# Patient Record
Sex: MALE | Race: WHITE | NOT HISPANIC OR LATINO
[De-identification: names, ages, dates, MRNs, and addresses within clinical notes are randomized per-mention and may not be internally consistent; named-entity substitution may affect disease eponyms.]

---

## 2017-03-23 ENCOUNTER — APPOINTMENT (OUTPATIENT)
Dept: DERMATOLOGY | Facility: CLINIC | Age: 62
End: 2017-03-23

## 2017-03-23 DIAGNOSIS — Z78.9 OTHER SPECIFIED HEALTH STATUS: ICD-10-CM

## 2017-03-23 DIAGNOSIS — I83.10 VARICOSE VEINS OF UNSPECIFIED LOWER EXTREMITY WITH INFLAMMATION: ICD-10-CM

## 2017-03-23 DIAGNOSIS — Z12.83 ENCOUNTER FOR SCREENING FOR MALIGNANT NEOPLASM OF SKIN: ICD-10-CM

## 2017-09-28 ENCOUNTER — APPOINTMENT (OUTPATIENT)
Dept: DERMATOLOGY | Facility: CLINIC | Age: 62
End: 2017-09-28
Payer: COMMERCIAL

## 2017-09-28 DIAGNOSIS — L30.9 DERMATITIS, UNSPECIFIED: ICD-10-CM

## 2017-09-28 PROCEDURE — 11100 BX SKIN SUBCUTANEOUS&/MUCOUS MEMBRANE 1 LESION: CPT

## 2017-09-28 PROCEDURE — 99213 OFFICE O/P EST LOW 20 MIN: CPT | Mod: 25

## 2017-09-28 RX ORDER — METOPROLOL SUCCINATE 25 MG/1
25 TABLET, EXTENDED RELEASE ORAL
Qty: 30 | Refills: 0 | Status: ACTIVE | COMMUNITY
Start: 2017-08-19

## 2017-09-28 RX ORDER — LISINOPRIL 30 MG/1
TABLET ORAL
Refills: 0 | Status: DISCONTINUED | COMMUNITY
End: 2017-09-28

## 2017-10-02 LAB — CORE LAB BIOPSY: NORMAL

## 2017-10-12 ENCOUNTER — APPOINTMENT (OUTPATIENT)
Dept: DERMATOLOGY | Facility: CLINIC | Age: 62
End: 2017-10-12
Payer: COMMERCIAL

## 2017-10-12 PROCEDURE — 13121 CMPLX RPR S/A/L 2.6-7.5 CM: CPT

## 2017-10-12 PROCEDURE — 11604 EXC TR-EXT MAL+MARG 3.1-4 CM: CPT

## 2017-10-17 LAB — CORE LAB BIOPSY: NORMAL

## 2017-10-25 ENCOUNTER — APPOINTMENT (OUTPATIENT)
Dept: DERMATOLOGY | Facility: CLINIC | Age: 62
End: 2017-10-25
Payer: COMMERCIAL

## 2017-10-25 PROCEDURE — 99212 OFFICE O/P EST SF 10 MIN: CPT

## 2018-05-30 ENCOUNTER — APPOINTMENT (OUTPATIENT)
Dept: DERMATOLOGY | Facility: CLINIC | Age: 63
End: 2018-05-30
Payer: COMMERCIAL

## 2018-05-30 VITALS — HEIGHT: 76 IN | BODY MASS INDEX: 28.01 KG/M2 | WEIGHT: 230 LBS

## 2018-05-30 DIAGNOSIS — C44.612 BASAL CELL CARCINOMA OF SKIN OF RIGHT UPPER LIMB, INCLUDING SHOULDER: ICD-10-CM

## 2018-05-30 DIAGNOSIS — L25.5 UNSPECIFIED CONTACT DERMATITIS DUE TO PLANTS, EXCEPT FOOD: ICD-10-CM

## 2018-05-30 PROCEDURE — 17000 DESTRUCT PREMALG LESION: CPT

## 2018-05-30 PROCEDURE — 17003 DESTRUCT PREMALG LES 2-14: CPT

## 2018-05-30 PROCEDURE — 99213 OFFICE O/P EST LOW 20 MIN: CPT | Mod: 25

## 2018-07-16 ENCOUNTER — APPOINTMENT (OUTPATIENT)
Dept: DERMATOLOGY | Facility: CLINIC | Age: 63
End: 2018-07-16

## 2018-07-23 PROBLEM — Z78.9 ALCOHOL USE: Status: ACTIVE | Noted: 2017-03-23

## 2018-07-23 PROBLEM — C44.612 BASAL CELL CARCINOMA OF RIGHT UPPER EXTREMITY: Status: ACTIVE | Noted: 2017-10-12

## 2018-11-14 ENCOUNTER — APPOINTMENT (OUTPATIENT)
Dept: DERMATOLOGY | Facility: CLINIC | Age: 63
End: 2018-11-14
Payer: COMMERCIAL

## 2018-11-14 PROCEDURE — 11101 BIOPSY SKIN SUBQ&/MUCOUS MEMBRANE EA ADDL LESN: CPT

## 2018-11-14 PROCEDURE — 17000 DESTRUCT PREMALG LESION: CPT

## 2018-11-14 PROCEDURE — 11100 BX SKIN SUBCUTANEOUS&/MUCOUS MEMBRANE 1 LESION: CPT | Mod: 59

## 2018-11-14 PROCEDURE — 99213 OFFICE O/P EST LOW 20 MIN: CPT | Mod: 25

## 2018-11-14 PROCEDURE — 17003 DESTRUCT PREMALG LES 2-14: CPT

## 2018-11-14 RX ORDER — TRIAMCINOLONE ACETONIDE 1 MG/G
0.1 CREAM TOPICAL
Qty: 80 | Refills: 2 | Status: DISCONTINUED | COMMUNITY
Start: 2017-03-23 | End: 2018-11-14

## 2018-11-14 RX ORDER — CLOBETASOL PROPIONATE 0.5 MG/G
0.05 GEL TOPICAL
Qty: 1 | Refills: 1 | Status: DISCONTINUED | COMMUNITY
Start: 2018-05-30 | End: 2018-11-14

## 2018-11-19 LAB — CORE LAB BIOPSY: NORMAL

## 2019-01-03 ENCOUNTER — APPOINTMENT (OUTPATIENT)
Dept: DERMATOLOGY | Facility: CLINIC | Age: 64
End: 2019-01-03
Payer: COMMERCIAL

## 2019-01-03 PROCEDURE — 11604 EXC TR-EXT MAL+MARG 3.1-4 CM: CPT

## 2019-01-03 PROCEDURE — 13121 CMPLX RPR S/A/L 2.6-7.5 CM: CPT

## 2019-01-14 LAB — CORE LAB BIOPSY: NORMAL

## 2019-01-17 ENCOUNTER — APPOINTMENT (OUTPATIENT)
Dept: DERMATOLOGY | Facility: CLINIC | Age: 64
End: 2019-01-17
Payer: COMMERCIAL

## 2019-01-17 PROCEDURE — 11603 EXC TR-EXT MAL+MARG 2.1-3 CM: CPT

## 2019-01-17 PROCEDURE — 13101 CMPLX RPR TRUNK 2.6-7.5 CM: CPT

## 2019-01-17 RX ORDER — ASPIRIN 81 MG
81 TABLET, DELAYED RELEASE (ENTERIC COATED) ORAL
Refills: 0 | Status: ACTIVE | COMMUNITY

## 2019-01-25 LAB — CORE LAB BIOPSY: NORMAL

## 2019-01-31 ENCOUNTER — APPOINTMENT (OUTPATIENT)
Dept: DERMATOLOGY | Facility: CLINIC | Age: 64
End: 2019-01-31
Payer: COMMERCIAL

## 2019-01-31 DIAGNOSIS — D04.72 CARCINOMA IN SITU OF SKIN OF LEFT LOWER LIMB, INCLUDING HIP: ICD-10-CM

## 2019-01-31 PROCEDURE — 17264 DSTRJ MAL LES T/A/L 3.1-4.0: CPT

## 2019-02-03 PROBLEM — L25.5 RHUS DERMATITIS: Status: ACTIVE | Noted: 2018-05-30

## 2019-03-07 ENCOUNTER — APPOINTMENT (OUTPATIENT)
Dept: DERMATOLOGY | Facility: CLINIC | Age: 64
End: 2019-03-07
Payer: COMMERCIAL

## 2019-03-07 PROCEDURE — 13102 CMPLX RPR TRUNK ADDL 5CM/<: CPT

## 2019-03-07 PROCEDURE — 11603 EXC TR-EXT MAL+MARG 2.1-3 CM: CPT | Mod: 59

## 2019-03-07 PROCEDURE — 13101 CMPLX RPR TRUNK 2.6-7.5 CM: CPT

## 2019-03-07 PROCEDURE — 11604 EXC TR-EXT MAL+MARG 3.1-4 CM: CPT

## 2019-03-12 LAB — CORE LAB BIOPSY: NORMAL

## 2019-03-14 ENCOUNTER — APPOINTMENT (OUTPATIENT)
Dept: DERMATOLOGY | Facility: CLINIC | Age: 64
End: 2019-03-14
Payer: COMMERCIAL

## 2019-03-14 PROCEDURE — 99024 POSTOP FOLLOW-UP VISIT: CPT

## 2019-03-14 NOTE — HISTORY OF PRESENT ILLNESS
[FreeTextEntry1] : Partial suture removal. [de-identified] : Left lower leg - healing slowly.  No evidence of infection.  \par Every other suture removed, from both sites.\par Dressed.\par Continue wound care.\par f/u in 1 week for remaining sutures.

## 2019-03-21 ENCOUNTER — APPOINTMENT (OUTPATIENT)
Dept: DERMATOLOGY | Facility: CLINIC | Age: 64
End: 2019-03-21
Payer: COMMERCIAL

## 2019-03-21 PROCEDURE — 99212 OFFICE O/P EST SF 10 MIN: CPT

## 2019-03-21 NOTE — HISTORY OF PRESENT ILLNESS
[FreeTextEntry1] : Suture removal. [de-identified] : Left lateral lower leg - both sites well healed, without evidence of infection.\par Sutures all removed.\par Path with clear margins.\par \par f/u in 4 months for TBSE.

## 2019-10-02 ENCOUNTER — APPOINTMENT (OUTPATIENT)
Dept: DERMATOLOGY | Facility: CLINIC | Age: 64
End: 2019-10-02
Payer: COMMERCIAL

## 2019-10-02 DIAGNOSIS — Z00.00 ENCOUNTER FOR GENERAL ADULT MEDICAL EXAMINATION W/OUT ABNORMAL FINDINGS: ICD-10-CM

## 2019-10-02 PROCEDURE — 17003 DESTRUCT PREMALG LES 2-14: CPT

## 2019-10-02 PROCEDURE — 17000 DESTRUCT PREMALG LESION: CPT

## 2019-10-02 PROCEDURE — 99213 OFFICE O/P EST LOW 20 MIN: CPT | Mod: 25

## 2019-10-02 NOTE — PHYSICAL EXAM
[Alert] : alert [Oriented x 3] : ~L oriented x 3 [Well Nourished] : well nourished [FreeTextEntry3] : A full skin exam was performed including the scalp, face (including lips, ears, nose and eyes), neck, chest, abdomen, back, buttocks, upper extremities and lower extremities.  The patient declined examination of the genitalia, feet.\par The exam revealed the following benign growths:\par Gladwin pigmented nevi.\par Lentigines.\par \par keratotic papules - right eyebrow, right temple, left temple, and left dorsal hand.\par  [Full Body Skin Exam Performed] : performed

## 2019-10-02 NOTE — HISTORY OF PRESENT ILLNESS
[FreeTextEntry1] : Patient presents for skin examination. [de-identified] : Notes left dorsal hand lesion for 2 months.  No bleeding, but occasionally tender.  No self tx.  Slow growth.

## 2019-11-21 ENCOUNTER — APPOINTMENT (OUTPATIENT)
Dept: SURGERY | Facility: CLINIC | Age: 64
End: 2019-11-21
Payer: COMMERCIAL

## 2019-11-21 VITALS
HEART RATE: 95 BPM | RESPIRATION RATE: 15 BRPM | OXYGEN SATURATION: 96 % | TEMPERATURE: 98.1 F | DIASTOLIC BLOOD PRESSURE: 92 MMHG | HEIGHT: 75 IN | BODY MASS INDEX: 24.25 KG/M2 | SYSTOLIC BLOOD PRESSURE: 159 MMHG | WEIGHT: 195 LBS

## 2019-11-21 DIAGNOSIS — Z85.048 PERSONAL HISTORY OF OTHER MALIGNANT NEOPLASM OF RECTUM, RECTOSIGMOID JUNCTION, AND ANUS: ICD-10-CM

## 2019-11-21 DIAGNOSIS — Z80.0 FAMILY HISTORY OF MALIGNANT NEOPLASM OF DIGESTIVE ORGANS: ICD-10-CM

## 2019-11-21 DIAGNOSIS — K62.4 STENOSIS OF ANUS AND RECTUM: ICD-10-CM

## 2019-11-21 DIAGNOSIS — R15.9 FULL INCONTINENCE OF FECES: ICD-10-CM

## 2019-11-21 DIAGNOSIS — E78.00 PURE HYPERCHOLESTEROLEMIA, UNSPECIFIED: ICD-10-CM

## 2019-11-21 DIAGNOSIS — Z85.038 PERSONAL HISTORY OF OTHER MALIGNANT NEOPLASM OF LARGE INTESTINE: ICD-10-CM

## 2019-11-21 DIAGNOSIS — I10 ESSENTIAL (PRIMARY) HYPERTENSION: ICD-10-CM

## 2019-11-21 PROCEDURE — 99244 OFF/OP CNSLTJ NEW/EST MOD 40: CPT

## 2019-11-21 RX ORDER — PSYLLIUM SEED (WITH DEXTROSE)
POWDER (GRAM) ORAL
Refills: 0 | Status: ACTIVE | COMMUNITY

## 2019-11-21 RX ORDER — AMOXICILLIN AND CLAVULANATE POTASSIUM 875; 125 MG/1; MG/1
875-125 TABLET, COATED ORAL
Qty: 20 | Refills: 0 | Status: DISCONTINUED | COMMUNITY
Start: 2018-11-23 | End: 2019-11-21

## 2019-11-21 RX ORDER — ATORVASTATIN CALCIUM 20 MG/1
20 TABLET, FILM COATED ORAL
Refills: 0 | Status: ACTIVE | COMMUNITY

## 2019-11-21 RX ORDER — ATORVASTATIN CALCIUM 80 MG/1
TABLET, FILM COATED ORAL
Refills: 0 | Status: DISCONTINUED | COMMUNITY
End: 2019-11-21

## 2019-11-21 NOTE — ASSESSMENT
[FreeTextEntry1] : I've seen and evaluated patient and I have corroborated all nursing input into this note. Patient status post neoadjuvant radiation followed by low anterior resection. He has significant distal scarring from the prior surgery and radiation. This has resulted in angulation and loss of compliance. He also has poor resting tone consistent with previous radiation. 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 Unfortunately this is a difficult problem to manage. I prescribed physical therapy and biofeedback. I also suggested a trial of tap water enemas using a Fleet enema bottle to wash out the neorectum prior to bedtime and after bowel movements to hopefully reduce the residual stool and in turn reduce the spontaneous evacuations. The patient has not seen his surgeon who performed the surgery that has resulted in this problem and I suggested that followup with the operating surgeon may be of some benefit because the operating surgeon will have the knowledge of the technical details involved with the surgery\par \par Finally, the patient has an irreducible incisional hernia at his ileostomy site. I ordered a CT scan to better define the hernia. After that, I will refer the patient to one of the hernia specialists in my group.

## 2019-11-21 NOTE — HISTORY OF PRESENT ILLNESS
[FreeTextEntry1] : Anthony is a 63 y/o male here for evaluation of fecal incontinence. PMH of rectal cancer, s/p neoadjuvant RT then LAR with diverting ileostomy with later ileostomy closure. Reports intermittent episodes of fecal incontinence for at least 10 years. Reports he is now experiencing constant rectal pressure and daily episodes of fecal incontinence of loose stool for the past 5 years. Patient takes Metamucil daily. Otherwise, has either loose stool or semi-solid BM every 2 to 3 days. Has abdominal discomfort. \par \par Colonoscopy 11/05/19 no recurrence

## 2019-11-21 NOTE — CONSULT LETTER
[Dear  ___] : Dear ~STEVEN, [Consult Letter:] : I had the pleasure of evaluating your patient, [unfilled]. [Please see my note below.] : Please see my note below. [Consult Closing:] : Thank you very much for allowing me to participate in the care of this patient.  If you have any questions, please do not hesitate to contact me. [Sincerely,] : Sincerely, [FreeTextEntry2] : Dr. Giacomo Rausch [FreeTextEntry3] : Miguel Ángel Rosa M.D., RHODA.CHOCO., F.ANGEL.S.KORYRNikkiS.\[facility] Chief Colorectal Clinical Services, [facility] [DrNikki  ___] : Dr. WARE

## 2019-11-21 NOTE — PHYSICAL EXAM
[Normal Breath Sounds] : Normal breath sounds [Normal Heart Sounds] : normal heart sounds [Normal Rate and Rhythm] : normal rate and rhythm [No Rash or Lesion] : No rash or lesion [Alert] : alert [Oriented to Person] : oriented to person [Oriented to Place] : oriented to place [Oriented to Time] : oriented to time [Calm] : calm [JVD] : no jugular venous distention  [de-identified] : non-tender irreducible incisional hernia at ileostomy site [de-identified] : Well nourished male, in no apparent distress [de-identified] : WNL [de-identified] : Full ROM [FreeTextEntry1] : Perianal inspection demonstrated a gaping anus with excoriations consistent with prior radiation. There was poor resting tone and fair squeeze tone on digital exam. Severe scarring with loss of compliance of the distal rectum was noted. Sharp posterior angulation was present. Therefore rigid sigmoidoscopy could not be performed.

## 2020-01-16 ENCOUNTER — FORM ENCOUNTER (OUTPATIENT)
Age: 65
End: 2020-01-16

## 2020-01-17 ENCOUNTER — OUTPATIENT (OUTPATIENT)
Dept: OUTPATIENT SERVICES | Facility: HOSPITAL | Age: 65
LOS: 1 days | End: 2020-01-17
Payer: COMMERCIAL

## 2020-01-17 ENCOUNTER — APPOINTMENT (OUTPATIENT)
Dept: CT IMAGING | Facility: CLINIC | Age: 65
End: 2020-01-17
Payer: COMMERCIAL

## 2020-01-17 DIAGNOSIS — Z85.048 PERSONAL HISTORY OF OTHER MALIGNANT NEOPLASM OF RECTUM, RECTOSIGMOID JUNCTION, AND ANUS: ICD-10-CM

## 2020-01-17 DIAGNOSIS — K43.2 INCISIONAL HERNIA WITHOUT OBSTRUCTION OR GANGRENE: ICD-10-CM

## 2020-01-17 PROCEDURE — 74177 CT ABD & PELVIS W/CONTRAST: CPT | Mod: 26

## 2020-01-17 PROCEDURE — 82565 ASSAY OF CREATININE: CPT

## 2020-01-17 PROCEDURE — 74177 CT ABD & PELVIS W/CONTRAST: CPT

## 2020-01-28 ENCOUNTER — APPOINTMENT (OUTPATIENT)
Dept: SURGERY | Facility: CLINIC | Age: 65
End: 2020-01-28
Payer: COMMERCIAL

## 2020-01-28 VITALS
BODY MASS INDEX: 24.25 KG/M2 | WEIGHT: 195 LBS | OXYGEN SATURATION: 97 % | RESPIRATION RATE: 16 BRPM | HEIGHT: 75 IN | SYSTOLIC BLOOD PRESSURE: 153 MMHG | TEMPERATURE: 98.2 F | HEART RATE: 74 BPM | DIASTOLIC BLOOD PRESSURE: 92 MMHG

## 2020-01-28 DIAGNOSIS — Z22.322 CARRIER OR SUSPECTED CARRIER OF METHICILLIN RESISTANT STAPHYLOCOCCUS AUREUS: ICD-10-CM

## 2020-01-28 DIAGNOSIS — A49.01 METHICILLIN SUSCEPTIBLE STAPHYLOCOCCUS AUREUS INFECTION, UNSPECIFIED SITE: ICD-10-CM

## 2020-01-28 DIAGNOSIS — Z22.321 CARRIER OR SUSPECTED CARRIER OF METHICILLIN SUSCEPTIBLE STAPHYLOCOCCUS AUREUS: ICD-10-CM

## 2020-01-28 PROCEDURE — 99244 OFF/OP CNSLTJ NEW/EST MOD 40: CPT

## 2020-01-28 NOTE — HISTORY OF PRESENT ILLNESS
[de-identified] : Anthony is a 65 y/o male here for evaluation of hernia. PMH of rectal cancer, s/p neoadjuvant RT then LAR with diverting ileostomy with later ileostomy closure. Last seen on 11/21/19 by Dr. Rosa. Patient with an irreducible incisional hernia at ileostomy site. CT scan recommended. 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 I reviewed the CT images with the patient and his wife. The patient states that he has never had any symptoms suggestive of small bowel obstruction. There was never been an episode of incarceration of this incisional hernia causing pain

## 2020-01-28 NOTE — PHYSICAL EXAM
[Normal Heart Sounds] : normal heart sounds [No HSM] : no hepatosplenomegaly [Alert] : alert [Oriented to Person] : oriented to person [Oriented to Place] : oriented to place [Calm] : calm [Oriented to Time] : oriented to time [JVD] : no jugular venous distention  [Abdominal Masses] : No abdominal masses [Abdomen Tenderness] : ~T ~M No abdominal tenderness [de-identified] : Developed well-nourished white male in no acute distress [de-identified] : Normal strength and gait [de-identified] : Within normal limits cranial nerves intact [de-identified] : There is a partially reducible right lower quadrant incisional hernia through the previous ileostomy site.

## 2020-01-28 NOTE — CONSULT LETTER
[Dear  ___] : Dear  [unfilled], [Please see my note below.] : Please see my note below. [Consult Closing:] : Thank you very much for allowing me to participate in the care of this patient.  If you have any questions, please do not hesitate to contact me. [Consult Letter:] : I had the pleasure of evaluating your patient, [unfilled]. [Sincerely,] : Sincerely, [FreeTextEntry3] : I have reviewed all the documentation for this encounter with the patient and have edited where appropriate\par \par Dr. Rohit Cortés

## 2020-01-28 NOTE — ASSESSMENT
[FreeTextEntry1] : A detailed discussion with the patient and his wife about repair of incisional hernias. I told the patient that this should be fixed specifically because there is bowel in this hernia. I discussed with the patient and his wife the different options for repair but no matter what the procedure would be used mesh will be used either in the abdomen or in the abdominal wall itself.\par \par I also showed him the CT scan that the patient has a left inguinal hernia containing fat that would not be addressed at the time of the repair of his incisional hernia.\par \par He patient and his wife S. older appropriate questions and all of which were answered to their satisfaction. They are going to go home and discuss my consultation among some cells and florentino back when they're ready to book surgery.\par \par I have discussed the risks and benefits  of surgery with the patient.  The risks which include  but are not exclusive of other issues included bleeding, bowel injury and the possibility of using mesh with the inherent risk of infection requiring the removal of the mesh.

## 2020-01-29 PROBLEM — A49.01 MSSA (METHICILLIN SUSCEPTIBLE STAPHYLOCOCCUS AUREUS): Status: ACTIVE | Noted: 2020-01-29

## 2020-01-29 PROBLEM — Z22.321 MSSA (METHICILLIN-SUSCEPTIBLE STAPH AUREUS) CARRIER: Status: ACTIVE | Noted: 2020-01-29

## 2020-01-29 LAB
MRSA SPEC QL CULT: NOT DETECTED
STAPH AUREUS (SA): DETECTED

## 2020-02-06 ENCOUNTER — OUTPATIENT (OUTPATIENT)
Dept: OUTPATIENT SERVICES | Facility: HOSPITAL | Age: 65
LOS: 1 days | End: 2020-02-06
Payer: COMMERCIAL

## 2020-02-06 VITALS
TEMPERATURE: 98 F | RESPIRATION RATE: 18 BRPM | HEART RATE: 82 BPM | SYSTOLIC BLOOD PRESSURE: 143 MMHG | OXYGEN SATURATION: 96 % | WEIGHT: 195.99 LBS | DIASTOLIC BLOOD PRESSURE: 85 MMHG | HEIGHT: 75 IN

## 2020-02-06 DIAGNOSIS — K43.2 INCISIONAL HERNIA WITHOUT OBSTRUCTION OR GANGRENE: ICD-10-CM

## 2020-02-06 DIAGNOSIS — I10 ESSENTIAL (PRIMARY) HYPERTENSION: ICD-10-CM

## 2020-02-06 DIAGNOSIS — Z01.818 ENCOUNTER FOR OTHER PREPROCEDURAL EXAMINATION: ICD-10-CM

## 2020-02-06 DIAGNOSIS — Z98.890 OTHER SPECIFIED POSTPROCEDURAL STATES: Chronic | ICD-10-CM

## 2020-02-06 DIAGNOSIS — Z90.89 ACQUIRED ABSENCE OF OTHER ORGANS: Chronic | ICD-10-CM

## 2020-02-06 DIAGNOSIS — Z90.49 ACQUIRED ABSENCE OF OTHER SPECIFIED PARTS OF DIGESTIVE TRACT: Chronic | ICD-10-CM

## 2020-02-06 LAB
ANION GAP SERPL CALC-SCNC: 17 MMOL/L — SIGNIFICANT CHANGE UP (ref 5–17)
BUN SERPL-MCNC: 11 MG/DL — SIGNIFICANT CHANGE UP (ref 7–23)
CALCIUM SERPL-MCNC: 9.7 MG/DL — SIGNIFICANT CHANGE UP (ref 8.4–10.5)
CHLORIDE SERPL-SCNC: 100 MMOL/L — SIGNIFICANT CHANGE UP (ref 96–108)
CO2 SERPL-SCNC: 25 MMOL/L — SIGNIFICANT CHANGE UP (ref 22–31)
CREAT SERPL-MCNC: 0.65 MG/DL — SIGNIFICANT CHANGE UP (ref 0.5–1.3)
GLUCOSE SERPL-MCNC: 86 MG/DL — SIGNIFICANT CHANGE UP (ref 70–99)
HCT VFR BLD CALC: 48.3 % — SIGNIFICANT CHANGE UP (ref 39–50)
HGB BLD-MCNC: 16 G/DL — SIGNIFICANT CHANGE UP (ref 13–17)
MCHC RBC-ENTMCNC: 33.1 GM/DL — SIGNIFICANT CHANGE UP (ref 32–36)
MCHC RBC-ENTMCNC: 33.2 PG — SIGNIFICANT CHANGE UP (ref 27–34)
MCV RBC AUTO: 100.2 FL — HIGH (ref 80–100)
NRBC # BLD: 0 /100 WBCS — SIGNIFICANT CHANGE UP (ref 0–0)
PLATELET # BLD AUTO: 223 K/UL — SIGNIFICANT CHANGE UP (ref 150–400)
POTASSIUM SERPL-MCNC: 4.3 MMOL/L — SIGNIFICANT CHANGE UP (ref 3.5–5.3)
POTASSIUM SERPL-SCNC: 4.3 MMOL/L — SIGNIFICANT CHANGE UP (ref 3.5–5.3)
RBC # BLD: 4.82 M/UL — SIGNIFICANT CHANGE UP (ref 4.2–5.8)
RBC # FLD: 12.2 % — SIGNIFICANT CHANGE UP (ref 10.3–14.5)
SODIUM SERPL-SCNC: 142 MMOL/L — SIGNIFICANT CHANGE UP (ref 135–145)
WBC # BLD: 8.32 K/UL — SIGNIFICANT CHANGE UP (ref 3.8–10.5)
WBC # FLD AUTO: 8.32 K/UL — SIGNIFICANT CHANGE UP (ref 3.8–10.5)

## 2020-02-06 PROCEDURE — 80048 BASIC METABOLIC PNL TOTAL CA: CPT

## 2020-02-06 PROCEDURE — G0463: CPT

## 2020-02-06 PROCEDURE — 85027 COMPLETE CBC AUTOMATED: CPT

## 2020-02-06 RX ORDER — CHLORHEXIDINE GLUCONATE 213 G/1000ML
1 SOLUTION TOPICAL ONCE
Refills: 0 | Status: DISCONTINUED | OUTPATIENT
Start: 2020-02-18 | End: 2020-02-20

## 2020-02-06 NOTE — H&P PST ADULT - NSICDXPASTSURGICALHX_GEN_ALL_CORE_FT
PAST SURGICAL HISTORY:  History of ankle surgery right ankle ORIF 2016    History of colon resection with illeostomy 2000    S/P tonsillectomy PAST SURGICAL HISTORY:  History of ankle surgery right ankle ORIF 2016    History of colon resection with ileostomy 2000 and  later with ileostomy closure    S/P tonsillectomy

## 2020-02-06 NOTE — H&P PST ADULT - NSICDXPASTMEDICALHX_GEN_ALL_CORE_FT
PAST MEDICAL HISTORY:  History of colorectal cancer 2004 with chemo and rad    Hyperlipidemia     Hypertension

## 2020-02-06 NOTE — H&P PST ADULT - NSICDXPROBLEM_GEN_ALL_CORE_FT
PROBLEM DIAGNOSES  Problem: Incisional hernia without obstruction or gangrene  Assessment and Plan: incisional hernia repair with mesh with epidural anesthesia    Problem: Hypertension  Assessment and Plan: continue med

## 2020-02-06 NOTE — H&P PST ADULT - HISTORY OF PRESENT ILLNESS
This is a 65 y/o male c/o abdominal hernia from old ileostomy site, he presents today for incisional hernia  repair with mesh with epidural anesthesia.  pt  with + MRSA swab done 1/20/20, and tomorrow he will start bactroban ointment to nose x 5 days prescribed by surgeon

## 2020-02-06 NOTE — H&P PST ADULT - NSANTHOSAYNRD_GEN_A_CORE
No. ABDIAZIZ screening performed.  STOP BANG Legend: 0-2 = LOW Risk; 3-4 = INTERMEDIATE Risk; 5-8 = HIGH Risk

## 2020-02-17 ENCOUNTER — TRANSCRIPTION ENCOUNTER (OUTPATIENT)
Age: 65
End: 2020-02-17

## 2020-02-18 ENCOUNTER — INPATIENT (INPATIENT)
Facility: HOSPITAL | Age: 65
LOS: 1 days | Discharge: HOME | DRG: 355 | End: 2020-02-20
Attending: SURGERY | Admitting: SURGERY
Payer: COMMERCIAL

## 2020-02-18 ENCOUNTER — APPOINTMENT (OUTPATIENT)
Dept: SURGERY | Facility: HOSPITAL | Age: 65
End: 2020-02-18
Payer: COMMERCIAL

## 2020-02-18 VITALS
OXYGEN SATURATION: 97 % | TEMPERATURE: 99 F | HEIGHT: 75 IN | WEIGHT: 195.99 LBS | DIASTOLIC BLOOD PRESSURE: 90 MMHG | RESPIRATION RATE: 16 BRPM | HEART RATE: 81 BPM | SYSTOLIC BLOOD PRESSURE: 151 MMHG

## 2020-02-18 DIAGNOSIS — Z90.49 ACQUIRED ABSENCE OF OTHER SPECIFIED PARTS OF DIGESTIVE TRACT: Chronic | ICD-10-CM

## 2020-02-18 DIAGNOSIS — Z90.89 ACQUIRED ABSENCE OF OTHER ORGANS: Chronic | ICD-10-CM

## 2020-02-18 DIAGNOSIS — Z98.890 OTHER SPECIFIED POSTPROCEDURAL STATES: Chronic | ICD-10-CM

## 2020-02-18 DIAGNOSIS — K43.2 INCISIONAL HERNIA WITHOUT OBSTRUCTION OR GANGRENE: ICD-10-CM

## 2020-02-18 PROCEDURE — 49568: CPT | Mod: 82

## 2020-02-18 PROCEDURE — 49560: CPT

## 2020-02-18 PROCEDURE — 49560: CPT | Mod: 82

## 2020-02-18 PROCEDURE — 49568: CPT

## 2020-02-18 RX ORDER — NALOXONE HYDROCHLORIDE 4 MG/.1ML
0.1 SPRAY NASAL
Refills: 0 | Status: DISCONTINUED | OUTPATIENT
Start: 2020-02-18 | End: 2020-02-19

## 2020-02-18 RX ORDER — ATORVASTATIN CALCIUM 80 MG/1
20 TABLET, FILM COATED ORAL AT BEDTIME
Refills: 0 | Status: DISCONTINUED | OUTPATIENT
Start: 2020-02-18 | End: 2020-02-20

## 2020-02-18 RX ORDER — ACETAMINOPHEN 500 MG
1000 TABLET ORAL ONCE
Refills: 0 | Status: DISCONTINUED | OUTPATIENT
Start: 2020-02-18 | End: 2020-02-18

## 2020-02-18 RX ORDER — ONDANSETRON 8 MG/1
4 TABLET, FILM COATED ORAL EVERY 6 HOURS
Refills: 0 | Status: DISCONTINUED | OUTPATIENT
Start: 2020-02-18 | End: 2020-02-19

## 2020-02-18 RX ORDER — LIDOCAINE HCL 20 MG/ML
0.2 VIAL (ML) INJECTION ONCE
Refills: 0 | Status: DISCONTINUED | OUTPATIENT
Start: 2020-02-18 | End: 2020-02-18

## 2020-02-18 RX ORDER — DIPHENHYDRAMINE HCL 50 MG
25 CAPSULE ORAL EVERY 4 HOURS
Refills: 0 | Status: DISCONTINUED | OUTPATIENT
Start: 2020-02-18 | End: 2020-02-19

## 2020-02-18 RX ORDER — HEPARIN SODIUM 5000 [USP'U]/ML
5000 INJECTION INTRAVENOUS; SUBCUTANEOUS EVERY 8 HOURS
Refills: 0 | Status: DISCONTINUED | OUTPATIENT
Start: 2020-02-18 | End: 2020-02-20

## 2020-02-18 RX ORDER — ONDANSETRON 8 MG/1
4 TABLET, FILM COATED ORAL ONCE
Refills: 0 | Status: DISCONTINUED | OUTPATIENT
Start: 2020-02-18 | End: 2020-02-18

## 2020-02-18 RX ORDER — DEXAMETHASONE 0.5 MG/5ML
4 ELIXIR ORAL EVERY 6 HOURS
Refills: 0 | Status: DISCONTINUED | OUTPATIENT
Start: 2020-02-18 | End: 2020-02-19

## 2020-02-18 RX ORDER — SODIUM CHLORIDE 9 MG/ML
3 INJECTION INTRAMUSCULAR; INTRAVENOUS; SUBCUTANEOUS EVERY 8 HOURS
Refills: 0 | Status: DISCONTINUED | OUTPATIENT
Start: 2020-02-18 | End: 2020-02-18

## 2020-02-18 RX ORDER — HYDROMORPHONE HYDROCHLORIDE 2 MG/ML
0.5 INJECTION INTRAMUSCULAR; INTRAVENOUS; SUBCUTANEOUS
Refills: 0 | Status: DISCONTINUED | OUTPATIENT
Start: 2020-02-18 | End: 2020-02-18

## 2020-02-18 RX ORDER — METOPROLOL TARTRATE 50 MG
25 TABLET ORAL DAILY
Refills: 0 | Status: DISCONTINUED | OUTPATIENT
Start: 2020-02-19 | End: 2020-02-20

## 2020-02-18 RX ORDER — CEFAZOLIN SODIUM 1 G
2000 VIAL (EA) INJECTION ONCE
Refills: 0 | Status: DISCONTINUED | OUTPATIENT
Start: 2020-02-18 | End: 2020-02-18

## 2020-02-18 RX ORDER — SODIUM CHLORIDE 9 MG/ML
1000 INJECTION, SOLUTION INTRAVENOUS
Refills: 0 | Status: DISCONTINUED | OUTPATIENT
Start: 2020-02-18 | End: 2020-02-19

## 2020-02-18 RX ADMIN — ATORVASTATIN CALCIUM 20 MILLIGRAM(S): 80 TABLET, FILM COATED ORAL at 21:09

## 2020-02-18 RX ADMIN — HEPARIN SODIUM 5000 UNIT(S): 5000 INJECTION INTRAVENOUS; SUBCUTANEOUS at 19:42

## 2020-02-18 RX ADMIN — SODIUM CHLORIDE 100 MILLILITER(S): 9 INJECTION, SOLUTION INTRAVENOUS at 15:04

## 2020-02-18 NOTE — ASU PREOP CHECKLIST - HEIGHT IN INCHES
Visit Vitals  /60 (BP 1 Location: Left arm, BP Patient Position: Sitting)   Pulse 65   Resp 16   Ht 5' 9\" (1.753 m)   Wt 200 lb (90.7 kg)   SpO2 98%   BMI 29.53 kg/m² 3

## 2020-02-18 NOTE — CHART NOTE - NSCHARTNOTEFT_GEN_A_CORE
STATUS POST:  Incisional hernia repair with mesh    POST OPERATIVE DAY #: 0    SUBJECTIVE: Pt seen patient seen at bedside, patient claims pain is well controlled on PCA, tolerating CLD, denies abdominal pain, nausea, vomiting, SOB, and chest pain      Vital Signs Last 24 Hrs  T(C): 36.7 (18 Feb 2020 18:38), Max: 37 (18 Feb 2020 10:08)  T(F): 98.1 (18 Feb 2020 18:38), Max: 98.6 (18 Feb 2020 10:08)  HR: 94 (18 Feb 2020 18:38) (70 - 94)  BP: 147/82 (18 Feb 2020 18:38) (117/62 - 151/90)  BP(mean): 95 (18 Feb 2020 15:00) (83 - 95)  RR: 18 (18 Feb 2020 18:38) (14 - 18)  SpO2: 93% (18 Feb 2020 18:38) (93% - 99%)  I&O's Summary    18 Feb 2020 07:01  -  18 Feb 2020 18:53  --------------------------------------------------------  IN: 340 mL / OUT: 500 mL / NET: -160 mL      I&O's Detail    18 Feb 2020 07:01  -  18 Feb 2020 18:53  --------------------------------------------------------  IN:    lactated ringers.: 340 mL  Total IN: 340 mL    OUT:    Indwelling Catheter - Urethral: 500 mL  Total OUT: 500 mL    Total NET: -160 mL          MEDICATIONS  (STANDING):  atorvastatin 20 milliGRAM(s) Oral at bedtime  chlorhexidine 2% Cloths 1 Application(s) Topical once  heparin  Injectable 5000 Unit(s) SubCutaneous every 8 hours  hydromorphone (10 MICROgram(s)/mL) + bupivacaine 0.0625% in 0.9% Sodium Chloride PCEA 250 milliLiter(s) Epidural PCA Continuous  lactated ringers. 1000 milliLiter(s) (100 mL/Hr) IV Continuous <Continuous>    MEDICATIONS  (PRN):  dexAMETHasone  Injectable 4 milliGRAM(s) IV Push every 6 hours PRN Nausea, IF ondansetron is ineffective after 30 - 60 minutes  diphenhydrAMINE   Injectable 25 milliGRAM(s) IV Push every 4 hours PRN Pruritus  hydromorphone (10 MICROgram(s)/mL) + bupivacaine 0.0625% in 0.9% Sodium Chloride PCEA Rescue Clinician  Bolus 5 milliLiter(s) Epidural every 15 minutes PRN for Pain Score greater than 6  naloxone Injectable 0.1 milliGRAM(s) IV Push every 3 minutes PRN For ANY of the following changes in patient status:  A. RR LESS THAN 10 breaths per minute, B. Oxygen saturation LESS THAN 90%, C. Sedation score of 6  ondansetron Injectable 4 milliGRAM(s) IV Push every 6 hours PRN Nausea      LABS:                RADIOLOGY & ADDITIONAL STUDIES:    Physical Exam:  General: NAD, resting comfortably  HEENT: NC/AT, EOMI, normal hearing, no oral lesions, no LAD, neck supple  Pulmonary: normal resp effort  Abdominal: soft, non distended, non tender to palpation, dressing c/d/i  Neuro: A/O x 3, no focal deficits  Pulses: palpable distal pulses    A/P: 64y Male s/p as above  - CLD  - OOB  - F/U AM labs   - Pain medication  - DVT ppx

## 2020-02-19 ENCOUNTER — TRANSCRIPTION ENCOUNTER (OUTPATIENT)
Age: 65
End: 2020-02-19

## 2020-02-19 PROBLEM — Z85.038 PERSONAL HISTORY OF OTHER MALIGNANT NEOPLASM OF LARGE INTESTINE: Chronic | Status: ACTIVE | Noted: 2020-02-06

## 2020-02-19 PROBLEM — E78.5 HYPERLIPIDEMIA, UNSPECIFIED: Chronic | Status: ACTIVE | Noted: 2020-02-06

## 2020-02-19 PROBLEM — I10 ESSENTIAL (PRIMARY) HYPERTENSION: Chronic | Status: ACTIVE | Noted: 2020-02-06

## 2020-02-19 LAB
ANION GAP SERPL CALC-SCNC: 15 MMOL/L — SIGNIFICANT CHANGE UP (ref 5–17)
BASOPHILS # BLD AUTO: 0.01 K/UL — SIGNIFICANT CHANGE UP (ref 0–0.2)
BASOPHILS NFR BLD AUTO: 0.1 % — SIGNIFICANT CHANGE UP (ref 0–2)
BUN SERPL-MCNC: 7 MG/DL — SIGNIFICANT CHANGE UP (ref 7–23)
CALCIUM SERPL-MCNC: 8.6 MG/DL — SIGNIFICANT CHANGE UP (ref 8.4–10.5)
CHLORIDE SERPL-SCNC: 99 MMOL/L — SIGNIFICANT CHANGE UP (ref 96–108)
CO2 SERPL-SCNC: 24 MMOL/L — SIGNIFICANT CHANGE UP (ref 22–31)
CREAT SERPL-MCNC: 0.54 MG/DL — SIGNIFICANT CHANGE UP (ref 0.5–1.3)
EOSINOPHIL # BLD AUTO: 0 K/UL — SIGNIFICANT CHANGE UP (ref 0–0.5)
EOSINOPHIL NFR BLD AUTO: 0 % — SIGNIFICANT CHANGE UP (ref 0–6)
GLUCOSE SERPL-MCNC: 118 MG/DL — HIGH (ref 70–99)
HCT VFR BLD CALC: 40.7 % — SIGNIFICANT CHANGE UP (ref 39–50)
HGB BLD-MCNC: 13.1 G/DL — SIGNIFICANT CHANGE UP (ref 13–17)
IMM GRANULOCYTES NFR BLD AUTO: 0.5 % — SIGNIFICANT CHANGE UP (ref 0–1.5)
INR BLD: 0.99 RATIO — SIGNIFICANT CHANGE UP (ref 0.88–1.16)
LYMPHOCYTES # BLD AUTO: 0.93 K/UL — LOW (ref 1–3.3)
LYMPHOCYTES # BLD AUTO: 7.9 % — LOW (ref 13–44)
MAGNESIUM SERPL-MCNC: 2 MG/DL — SIGNIFICANT CHANGE UP (ref 1.6–2.6)
MCHC RBC-ENTMCNC: 32.2 GM/DL — SIGNIFICANT CHANGE UP (ref 32–36)
MCHC RBC-ENTMCNC: 33.2 PG — SIGNIFICANT CHANGE UP (ref 27–34)
MCV RBC AUTO: 103 FL — HIGH (ref 80–100)
MONOCYTES # BLD AUTO: 1.07 K/UL — HIGH (ref 0–0.9)
MONOCYTES NFR BLD AUTO: 9.1 % — SIGNIFICANT CHANGE UP (ref 2–14)
NEUTROPHILS # BLD AUTO: 9.67 K/UL — HIGH (ref 1.8–7.4)
NEUTROPHILS NFR BLD AUTO: 82.4 % — HIGH (ref 43–77)
NRBC # BLD: 0 /100 WBCS — SIGNIFICANT CHANGE UP (ref 0–0)
PHOSPHATE SERPL-MCNC: 2.9 MG/DL — SIGNIFICANT CHANGE UP (ref 2.5–4.5)
PLATELET # BLD AUTO: 193 K/UL — SIGNIFICANT CHANGE UP (ref 150–400)
POTASSIUM SERPL-MCNC: 4.4 MMOL/L — SIGNIFICANT CHANGE UP (ref 3.5–5.3)
POTASSIUM SERPL-SCNC: 4.4 MMOL/L — SIGNIFICANT CHANGE UP (ref 3.5–5.3)
PROTHROM AB SERPL-ACNC: 11.4 SEC — SIGNIFICANT CHANGE UP (ref 10–12.9)
RBC # BLD: 3.95 M/UL — LOW (ref 4.2–5.8)
RBC # FLD: 12.1 % — SIGNIFICANT CHANGE UP (ref 10.3–14.5)
SODIUM SERPL-SCNC: 138 MMOL/L — SIGNIFICANT CHANGE UP (ref 135–145)
WBC # BLD: 11.74 K/UL — HIGH (ref 3.8–10.5)
WBC # FLD AUTO: 11.74 K/UL — HIGH (ref 3.8–10.5)

## 2020-02-19 RX ORDER — ACETAMINOPHEN 500 MG
3 TABLET ORAL
Qty: 0 | Refills: 0 | DISCHARGE
Start: 2020-02-19

## 2020-02-19 RX ORDER — ACETAMINOPHEN 500 MG
975 TABLET ORAL EVERY 6 HOURS
Refills: 0 | Status: DISCONTINUED | OUTPATIENT
Start: 2020-02-19 | End: 2020-02-20

## 2020-02-19 RX ORDER — OXYCODONE HYDROCHLORIDE 5 MG/1
5 TABLET ORAL EVERY 4 HOURS
Refills: 0 | Status: DISCONTINUED | OUTPATIENT
Start: 2020-02-19 | End: 2020-02-20

## 2020-02-19 RX ORDER — OXYCODONE HYDROCHLORIDE 5 MG/1
1 TABLET ORAL
Qty: 25 | Refills: 0
Start: 2020-02-19

## 2020-02-19 RX ORDER — OXYCODONE HYDROCHLORIDE 5 MG/1
10 TABLET ORAL EVERY 6 HOURS
Refills: 0 | Status: DISCONTINUED | OUTPATIENT
Start: 2020-02-19 | End: 2020-02-20

## 2020-02-19 RX ADMIN — OXYCODONE HYDROCHLORIDE 10 MILLIGRAM(S): 5 TABLET ORAL at 16:54

## 2020-02-19 RX ADMIN — ATORVASTATIN CALCIUM 20 MILLIGRAM(S): 80 TABLET, FILM COATED ORAL at 21:45

## 2020-02-19 RX ADMIN — OXYCODONE HYDROCHLORIDE 10 MILLIGRAM(S): 5 TABLET ORAL at 23:17

## 2020-02-19 RX ADMIN — OXYCODONE HYDROCHLORIDE 10 MILLIGRAM(S): 5 TABLET ORAL at 11:45

## 2020-02-19 RX ADMIN — HEPARIN SODIUM 5000 UNIT(S): 5000 INJECTION INTRAVENOUS; SUBCUTANEOUS at 06:12

## 2020-02-19 RX ADMIN — Medication 975 MILLIGRAM(S): at 21:47

## 2020-02-19 RX ADMIN — Medication 25 MILLIGRAM(S): at 06:11

## 2020-02-19 RX ADMIN — OXYCODONE HYDROCHLORIDE 10 MILLIGRAM(S): 5 TABLET ORAL at 11:12

## 2020-02-19 RX ADMIN — HEPARIN SODIUM 5000 UNIT(S): 5000 INJECTION INTRAVENOUS; SUBCUTANEOUS at 21:45

## 2020-02-19 RX ADMIN — HEPARIN SODIUM 5000 UNIT(S): 5000 INJECTION INTRAVENOUS; SUBCUTANEOUS at 15:49

## 2020-02-19 RX ADMIN — Medication 975 MILLIGRAM(S): at 18:21

## 2020-02-19 RX ADMIN — Medication 975 MILLIGRAM(S): at 12:50

## 2020-02-19 RX ADMIN — Medication 975 MILLIGRAM(S): at 13:30

## 2020-02-19 RX ADMIN — OXYCODONE HYDROCHLORIDE 10 MILLIGRAM(S): 5 TABLET ORAL at 17:30

## 2020-02-19 RX ADMIN — OXYCODONE HYDROCHLORIDE 10 MILLIGRAM(S): 5 TABLET ORAL at 22:47

## 2020-02-19 NOTE — DISCHARGE NOTE PROVIDER - HOSPITAL COURSE
65 y/o male c/o abdominal hernia from old ileostomy site presented this admission for incisional hernia repair with mesh on 2/18/20. The patient tolerated the procedure well (see operative report for full details). Postoperatively, he had a PCEA for pain control which was removed on POD #1.  Diet was advanced as tolerated to regular.  He was transitioned to oral pain medication once epidural removed.  [place] was discontinued and he was voiding well on his own. On day of discharge, the patient was tolerating diet, ambulating well and pain controlled. He will follow up with Dr. Cortés in 1 week.

## 2020-02-19 NOTE — DISCHARGE NOTE PROVIDER - NSDCFUSCHEDAPPT_GEN_ALL_CORE_FT
VANI RIGGINS ; 04/01/2020 ; NPP Derm [address] [facility] VANI RIGGINS ; 02/27/2020 ; NPP Gen Surg [address] [facility] VANI Maharaj ; 04/01/2020 ; NPP Derm [address] [facility]

## 2020-02-19 NOTE — DISCHARGE NOTE PROVIDER - CARE PROVIDER_API CALL
Rohit Cortés)  Surgery  [address], [address]  [place] [address]  Phone: [phone]  Fax: [phone]  Follow Up Time:

## 2020-02-19 NOTE — PROGRESS NOTE ADULT - SUBJECTIVE AND OBJECTIVE BOX
Day _1__ of Anesthesia Pain Management Service    SUBJECTIVE:  Pain Scale Score	At rest: _1__ 	With Activity: __3_ 	[  ] Refer to charted pain scores    THERAPY:  [x ] Epidural Bupivacaine 0.0625% and Hydromorphone 10 micrograms/mL  [ ] Epidural Ropivacaine 0.2% plain   [ ] Epidural Bupivacaine 0.01 % and Fentanyl 3 micrograms/mL  (OB)    Demand dose _3__ lockout 15___ (minutes) Continuous Rate 6___       MEDICATIONS  (STANDING):  atorvastatin 20 milliGRAM(s) Oral at bedtime  chlorhexidine 2% Cloths 1 Application(s) Topical once  heparin  Injectable 5000 Unit(s) SubCutaneous every 8 hours  hydromorphone (10 MICROgram(s)/mL) + bupivacaine 0.0625% in 0.9% Sodium Chloride PCEA 250 milliLiter(s) Epidural PCA Continuous  metoprolol succinate ER 25 milliGRAM(s) Oral daily    MEDICATIONS  (PRN):  dexAMETHasone  Injectable 4 milliGRAM(s) IV Push every 6 hours PRN Nausea, IF ondansetron is ineffective after 30 - 60 minutes  diphenhydrAMINE   Injectable 25 milliGRAM(s) IV Push every 4 hours PRN Pruritus  hydromorphone (10 MICROgram(s)/mL) + bupivacaine 0.0625% in 0.9% Sodium Chloride PCEA Rescue Clinician  Bolus 5 milliLiter(s) Epidural every 15 minutes PRN for Pain Score greater than 6  naloxone Injectable 0.1 milliGRAM(s) IV Push every 3 minutes PRN For ANY of the following changes in patient status:  A. RR LESS THAN 10 breaths per minute, B. Oxygen saturation LESS THAN 90%, C. Sedation score of 6  ondansetron Injectable 4 milliGRAM(s) IV Push every 6 hours PRN Nausea      OBJECTIVE:    Assessment of Epidural Catheter Site: 	    [x ] Dressing intact	[ x] Site non-tender	[x ] Site without erythema, discharge, edema  [x ] Epidural tubing and connection checked	[x [ Gross neurological exam within normal limits  [ ] Catheter removed – tip intact		    PT/INR - ( 19 Feb 2020 07:29 )   PT: 11.4 sec;   INR: 0.99 ratio                               13.1   11.74 )-----------( 193      ( 19 Feb 2020 07:29 )             40.7     Vital Signs Last 24 Hrs  T(C): 36.5 (02-19-20 @ 08:54), Max: 37 (02-18-20 @ 10:08)  T(F): 97.7 (02-19-20 @ 08:54), Max: 98.6 (02-18-20 @ 10:08)  HR: 81 (02-19-20 @ 06:00) (70 - 99)  BP: 142/76 (02-19-20 @ 06:00) (117/62 - 151/90)  BP(mean): 95 (02-18-20 @ 15:00) (83 - 95)  RR: 18 (02-19-20 @ 08:54) (14 - 18)  SpO2: 98% (02-19-20 @ 08:54) (93% - 99%)      Sedation Score:	[x ] Alert	[ ] Drowsy	[ ] Arousable  [ ] Asleep  [ ] Unresponsive    Side Effects:	[x ] None	[ ] Nausea	[ ] Vomiting  [ ] Pruritus  		[ ] Weakness  [ ] Numbness  [ ] Other:    ASSESSMENT/ PLAN:    Therapy to  be:	[x ] Continue   [ ] Discontinued   [ ] Change to prn Analgesics    Documentation and Verification of current medications:  [ X ] Done	[ ] Not done, not eligible, reason:    Comments:

## 2020-02-19 NOTE — DISCHARGE NOTE PROVIDER - NSDCCPCAREPLAN_GEN_ALL_CORE_FT
PRINCIPAL DISCHARGE DIAGNOSIS  Diagnosis: Incisional hernia  Assessment and Plan of Treatment: WOUND CARE: Leave steri strips in place until they come off on their own.  Please pat area dry.   BATHING: Please do not submerge wound underwater. You may shower and/or sponge bathe.  ACTIVITY: No heavy lifting anything more than 10-15lbs or straining. Otherwise, you may return to your usual level of physical activity. If you are taking narcotic pain medication (such as oxycodone or Percocet), do NOT drive a car, operate machinery or make important decisions.  NOTIFY YOUR SURGEON IF: You have any bleeding that does not stop, any pus draining from your wound, excessive swelling or redness around wound, any fever (over 100.4 F) or chills, persistent nausea/vomiting with inability to tolerate food or liquids, persistent diarrhea, or if your pain is not controlled on your discharge pain medications.  FOLLOW-UP:  1. Please call to make a follow-up appointment in 1-2 weeks upon discharge from the hospital with Dr. Cortés  2. Please follow up with your primary care physician in one week regarding your hospitalization.

## 2020-02-19 NOTE — PROGRESS NOTE ADULT - ASSESSMENT
This is a 65 y/o male c/o abdominal hernia from old ileostomy site, s/p repair of incisional hernia with mesh on 2/18/2020    - Advance diet as tolerated  - PCA for pain control  - Monitor for bowel function  - OOB

## 2020-02-19 NOTE — PROGRESS NOTE ADULT - SUBJECTIVE AND OBJECTIVE BOX
SURGERY DAILY PROGRESS NOTE:       SUBJECTIVE/ROS: Patient examined at bedside. no acute events overnight, claims pain is well controlled on PCA, tolerating CLD, -/-  Denies nausea, vomiting, chest pain, shortness of breath         MEDICATIONS  (STANDING):  atorvastatin 20 milliGRAM(s) Oral at bedtime  chlorhexidine 2% Cloths 1 Application(s) Topical once  heparin  Injectable 5000 Unit(s) SubCutaneous every 8 hours  hydromorphone (10 MICROgram(s)/mL) + bupivacaine 0.0625% in 0.9% Sodium Chloride PCEA 250 milliLiter(s) Epidural PCA Continuous  lactated ringers. 1000 milliLiter(s) (100 mL/Hr) IV Continuous <Continuous>  metoprolol succinate ER 25 milliGRAM(s) Oral daily    MEDICATIONS  (PRN):  dexAMETHasone  Injectable 4 milliGRAM(s) IV Push every 6 hours PRN Nausea, IF ondansetron is ineffective after 30 - 60 minutes  diphenhydrAMINE   Injectable 25 milliGRAM(s) IV Push every 4 hours PRN Pruritus  hydromorphone (10 MICROgram(s)/mL) + bupivacaine 0.0625% in 0.9% Sodium Chloride PCEA Rescue Clinician  Bolus 5 milliLiter(s) Epidural every 15 minutes PRN for Pain Score greater than 6  naloxone Injectable 0.1 milliGRAM(s) IV Push every 3 minutes PRN For ANY of the following changes in patient status:  A. RR LESS THAN 10 breaths per minute, B. Oxygen saturation LESS THAN 90%, C. Sedation score of 6  ondansetron Injectable 4 milliGRAM(s) IV Push every 6 hours PRN Nausea      OBJECTIVE:    Vital Signs Last 24 Hrs  T(C): 36.7 (19 Feb 2020 01:06), Max: 37 (18 Feb 2020 10:08)  T(F): 98 (19 Feb 2020 01:06), Max: 98.6 (18 Feb 2020 10:08)  HR: 99 (19 Feb 2020 01:06) (70 - 99)  BP: 130/78 (19 Feb 2020 01:06) (117/62 - 151/90)  BP(mean): 95 (18 Feb 2020 15:00) (83 - 95)  RR: 18 (19 Feb 2020 01:06) (14 - 18)  SpO2: 95% (19 Feb 2020 01:06) (93% - 99%)        I&O's Detail    18 Feb 2020 07:01  -  19 Feb 2020 01:52  --------------------------------------------------------  IN:    lactated ringers.: 340 mL  Total IN: 340 mL    OUT:    Indwelling Catheter - Urethral: 900 mL  Total OUT: 900 mL    Total NET: -560 mL          Daily Height in cm: 190.5 (18 Feb 2020 10:37)    Daily     LABS:                            PHYSICAL EXAM:  Constitutional: well developed, well nourished, NAD  Eyes: anicteric  ENMT: normal facies, symmetric  Respiratory: Breathing comfortably    Gastrointestinal: abdomen soft, nontender, nondistended. dressing c/d/i  Extremities: FROM, warm  Neurological: intact, non-focal  Psychiatric: oriented x 3; appropriate

## 2020-02-19 NOTE — DISCHARGE NOTE PROVIDER - NSDCCPTREATMENT_GEN_ALL_CORE_FT
PRINCIPAL PROCEDURE  Procedure: Incisional hernia repair with mesh  Findings and Treatment: recover from surgery - see operative report

## 2020-02-19 NOTE — DISCHARGE NOTE PROVIDER - NSDCMRMEDTOKEN_GEN_ALL_CORE_FT
acetaminophen 325 mg oral tablet: 3 tab(s) orally every 6 hours  atorvastatin 20 mg oral tablet: 1 tab(s) orally once a day  metoprolol succinate 25 mg oral tablet, extended release: 1 tab(s) orally once a day  oxyCODONE 5 mg oral tablet: 1-2  tab(s) orally every 4 hours, As needed, Moderate Pain (4 - 6) MDD:4

## 2020-02-20 ENCOUNTER — TRANSCRIPTION ENCOUNTER (OUTPATIENT)
Age: 65
End: 2020-02-20

## 2020-02-20 VITALS
HEART RATE: 70 BPM | DIASTOLIC BLOOD PRESSURE: 80 MMHG | TEMPERATURE: 98 F | SYSTOLIC BLOOD PRESSURE: 129 MMHG | RESPIRATION RATE: 17 BRPM | OXYGEN SATURATION: 95 %

## 2020-02-20 PROCEDURE — 80048 BASIC METABOLIC PNL TOTAL CA: CPT

## 2020-02-20 PROCEDURE — 85610 PROTHROMBIN TIME: CPT

## 2020-02-20 PROCEDURE — 83735 ASSAY OF MAGNESIUM: CPT

## 2020-02-20 PROCEDURE — 84100 ASSAY OF PHOSPHORUS: CPT

## 2020-02-20 PROCEDURE — 99238 HOSP IP/OBS DSCHRG MGMT 30/<: CPT

## 2020-02-20 PROCEDURE — C1781: CPT

## 2020-02-20 PROCEDURE — 85027 COMPLETE CBC AUTOMATED: CPT

## 2020-02-20 PROCEDURE — C1889: CPT

## 2020-02-20 RX ADMIN — Medication 975 MILLIGRAM(S): at 06:40

## 2020-02-20 RX ADMIN — OXYCODONE HYDROCHLORIDE 10 MILLIGRAM(S): 5 TABLET ORAL at 06:40

## 2020-02-20 RX ADMIN — OXYCODONE HYDROCHLORIDE 10 MILLIGRAM(S): 5 TABLET ORAL at 12:18

## 2020-02-20 RX ADMIN — Medication 975 MILLIGRAM(S): at 06:10

## 2020-02-20 RX ADMIN — Medication 975 MILLIGRAM(S): at 12:20

## 2020-02-20 RX ADMIN — OXYCODONE HYDROCHLORIDE 10 MILLIGRAM(S): 5 TABLET ORAL at 13:00

## 2020-02-20 RX ADMIN — Medication 975 MILLIGRAM(S): at 13:00

## 2020-02-20 RX ADMIN — HEPARIN SODIUM 5000 UNIT(S): 5000 INJECTION INTRAVENOUS; SUBCUTANEOUS at 06:11

## 2020-02-20 RX ADMIN — OXYCODONE HYDROCHLORIDE 10 MILLIGRAM(S): 5 TABLET ORAL at 06:10

## 2020-02-20 NOTE — PROGRESS NOTE ADULT - ASSESSMENT
This is a 63 y/o male c/o abdominal hernia from old ileostomy site, s/p repair of incisional hernia with mesh on 2/18/2020    - c.w regular diet   - PO pain control   - dvt ppx   - OOB/IS   - Plan for dc home today     Green Team   [id number]

## 2020-02-20 NOTE — PROGRESS NOTE ADULT - ATTENDING COMMENTS
I have seen and examined the patient.  I agree with the surgical resident's note.  The patient has been instructed for postoperative wound care and office follow-up.  He is to be discharged today patient has been instructed in wound care and follow-up and will be discharged today    Rohit Cortés contact information [phone]

## 2020-02-20 NOTE — PROGRESS NOTE ADULT - SUBJECTIVE AND OBJECTIVE BOX
SURGERY DAILY PROGRESS NOTE:     SUBJECTIVE/24hr Events:     Patient seen and examined on am rounds. Advanced to regular diet yesterday. No acute events overnight. This am pt feels well. Pain well controlled.  Denies chest pain, shortness of breath, nausea, vomiting, fever chills. Tolerating diet. Endorses flatus.      OBJECTIVE:    MEDICATIONS  (STANDING):  acetaminophen   Tablet .. 975 milliGRAM(s) Oral every 6 hours  atorvastatin 20 milliGRAM(s) Oral at bedtime  chlorhexidine 2% Cloths 1 Application(s) Topical once  heparin  Injectable 5000 Unit(s) SubCutaneous every 8 hours  metoprolol succinate ER 25 milliGRAM(s) Oral daily    MEDICATIONS  (PRN):  oxyCODONE    IR 5 milliGRAM(s) Oral every 4 hours PRN Moderate Pain (4 - 6)  oxyCODONE    IR 10 milliGRAM(s) Oral every 6 hours PRN Severe Pain (7 - 10)      Vital Signs Last 24 Hrs  T(C): 36.4 (2020 21:06), Max: 36.7 (2020 13:40)  T(F): 97.6 (2020 21:06), Max: 98.1 (2020 13:40)  HR: 67 (2020 21:06) (62 - 81)  BP: 112/68 (2020 21:06) (112/68 - 142/76)  BP(mean): --  RR: 18 (2020 21:06) (17 - 18)  SpO2: 95% (2020 21:06) (95% - 98%)      I&O's Detail    2020 07:01  -  2020 07:00  --------------------------------------------------------  IN:    lactated ringers.: 1540 mL    Oral Fluid: 360 mL  Total IN: 1900 mL    OUT:    Indwelling Catheter - Urethral: 1650 mL  Total OUT: 1650 mL    Total NET: 250 mL      2020 07:01  -  2020 04:52  --------------------------------------------------------  IN:    Oral Fluid: 1020 mL  Total IN: 1020 mL    OUT:    Indwelling Catheter - Urethral: 450 mL    Voided: 500 mL  Total OUT: 950 mL    Total NET: 70 mL            Daily     Daily Weight in k.7 (2020 13:40)          LABS:                        13.1   11.74 )-----------( 193      ( 2020 07:29 )             40.7         138  |  99  |  7   ----------------------------<  118<H>  4.4   |  24  |  0.54    Ca    8.6      2020 07:29  Phos  2.9       Mg     2.0           PT/INR - ( 2020 07:29 )   PT: 11.4 sec;   INR: 0.99 ratio                       PHYSICAL EXAM:  Constitutional: well developed, well nourished, NAD  ENMT: normal facies, symmetric  Respiratory: Normal respiratory effort   Abdomen: Soft, appropriately tender, nondistended. Incision c/d/i. Small induration palpated at surgical site, unchanged from yesterday, non expanding, no change with valsava, no overlying skin changes. SURGERY DAILY PROGRESS NOTE:     SUBJECTIVE/24hr Events:     Patient seen and examined on am rounds. Advanced to regular diet yesterday. PCA and herrera discontinued, passed TOV.  No acute events overnight. This am pt feels well. Pain well controlled.  Denies chest pain, shortness of breath, nausea, vomiting, fever chills. Tolerating diet. Endorses flatus.      OBJECTIVE:    MEDICATIONS  (STANDING):  acetaminophen   Tablet .. 975 milliGRAM(s) Oral every 6 hours  atorvastatin 20 milliGRAM(s) Oral at bedtime  chlorhexidine 2% Cloths 1 Application(s) Topical once  heparin  Injectable 5000 Unit(s) SubCutaneous every 8 hours  metoprolol succinate ER 25 milliGRAM(s) Oral daily    MEDICATIONS  (PRN):  oxyCODONE    IR 5 milliGRAM(s) Oral every 4 hours PRN Moderate Pain (4 - 6)  oxyCODONE    IR 10 milliGRAM(s) Oral every 6 hours PRN Severe Pain (7 - 10)      Vital Signs Last 24 Hrs  T(C): 36.4 (2020 21:06), Max: 36.7 (2020 13:40)  T(F): 97.6 (2020 21:06), Max: 98.1 (2020 13:40)  HR: 67 (2020 21:06) (62 - 81)  BP: 112/68 (2020 21:06) (112/68 - 142/76)  BP(mean): --  RR: 18 (2020 21:06) (17 - 18)  SpO2: 95% (2020 21:06) (95% - 98%)      I&O's Detail    2020 07:01  -  2020 07:00  --------------------------------------------------------  IN:    lactated ringers.: 1540 mL    Oral Fluid: 360 mL  Total IN: 1900 mL    OUT:    Indwelling Catheter - Urethral: 1650 mL  Total OUT: 1650 mL    Total NET: 250 mL      2020 07:01  -  2020 04:52  --------------------------------------------------------  IN:    Oral Fluid: 1020 mL  Total IN: 1020 mL    OUT:    Indwelling Catheter - Urethral: 450 mL    Voided: 500 mL  Total OUT: 950 mL    Total NET: 70 mL            Daily     Daily Weight in k.7 (2020 13:40)          LABS:                        13.1   11.74 )-----------( 193      ( 2020 07:29 )             40.7     02-    138  |  99  |  7   ----------------------------<  118<H>  4.4   |  24  |  0.54    Ca    8.6      2020 07:29  Phos  2.9       Mg     2.0           PT/INR - ( 2020 07:29 )   PT: 11.4 sec;   INR: 0.99 ratio                       PHYSICAL EXAM:  Constitutional: well developed, well nourished, NAD  ENMT: normal facies, symmetric  Respiratory: Normal respiratory effort   Abdomen: Soft, appropriately tender, nondistended. Incision c/d/i. Small induration palpated at surgical site, unchanged from yesterday, non expanding, no change with valsava, no overlying skin changes.

## 2020-02-20 NOTE — DISCHARGE NOTE NURSING/CASE MANAGEMENT/SOCIAL WORK - NSDCPNINST_GEN_ALL_CORE
NOTIFY YOUR SURGEON IF: You have any bleeding that does not stop, any drainage from your incision, any fever (over 100.4 F) or chills, persistent nausea/vomiting, persistent diarrhea, or if your pain is not controlled on your discharge pain medications.

## 2020-02-20 NOTE — DISCHARGE NOTE NURSING/CASE MANAGEMENT/SOCIAL WORK - PATIENT PORTAL LINK FT
You can access the FollowMyHealth Patient Portal offered by [facility] by registering at the following website: [email or web address]. By joining [facility]’s FollowMyHealth portal, you will also be able to view your health information using other applications (apps) compatible with our system.

## 2020-02-27 ENCOUNTER — APPOINTMENT (OUTPATIENT)
Dept: SURGERY | Facility: CLINIC | Age: 65
End: 2020-02-27
Payer: COMMERCIAL

## 2020-02-27 VITALS
TEMPERATURE: 98.2 F | HEART RATE: 74 BPM | OXYGEN SATURATION: 93 % | DIASTOLIC BLOOD PRESSURE: 81 MMHG | SYSTOLIC BLOOD PRESSURE: 136 MMHG

## 2020-02-27 PROCEDURE — 99024 POSTOP FOLLOW-UP VISIT: CPT

## 2020-02-27 RX ORDER — MUPIROCIN 20 MG/G
2 OINTMENT TOPICAL
Qty: 1 | Refills: 0 | Status: DISCONTINUED | COMMUNITY
Start: 2020-01-29 | End: 2020-02-27

## 2020-02-27 NOTE — HISTORY OF PRESENT ILLNESS
[de-identified] : Anthony is a 63 y/o male here for S/P incisional hernia repair with mesh.  The patient has no complaints.  He has developed some ecchymosis below the incision.  Tolerating regular diet having normal bowel function.

## 2020-02-27 NOTE — PHYSICAL EXAM
[de-identified] : There is moderate amount of ecchymosis below the incision.  There is a hematoma underneath the wound for which I aspirated 5 cc of old blood.

## 2020-03-10 ENCOUNTER — APPOINTMENT (OUTPATIENT)
Dept: MRI IMAGING | Facility: CLINIC | Age: 65
End: 2020-03-10
Payer: COMMERCIAL

## 2020-03-10 ENCOUNTER — OUTPATIENT (OUTPATIENT)
Dept: OUTPATIENT SERVICES | Facility: HOSPITAL | Age: 65
LOS: 1 days | End: 2020-03-10
Payer: COMMERCIAL

## 2020-03-10 DIAGNOSIS — Z90.89 ACQUIRED ABSENCE OF OTHER ORGANS: Chronic | ICD-10-CM

## 2020-03-10 DIAGNOSIS — Z90.49 ACQUIRED ABSENCE OF OTHER SPECIFIED PARTS OF DIGESTIVE TRACT: Chronic | ICD-10-CM

## 2020-03-10 DIAGNOSIS — Z98.890 OTHER SPECIFIED POSTPROCEDURAL STATES: Chronic | ICD-10-CM

## 2020-03-10 DIAGNOSIS — Z00.8 ENCOUNTER FOR OTHER GENERAL EXAMINATION: ICD-10-CM

## 2020-03-10 PROCEDURE — 73718 MRI LOWER EXTREMITY W/O DYE: CPT | Mod: 26,RT

## 2020-03-10 PROCEDURE — 73718 MRI LOWER EXTREMITY W/O DYE: CPT

## 2020-03-11 PROBLEM — K43.2 INCISIONAL HERNIA, WITHOUT OBSTRUCTION OR GANGRENE: Status: RESOLVED | Noted: 2019-11-21 | Resolved: 2020-03-11

## 2020-03-12 ENCOUNTER — APPOINTMENT (OUTPATIENT)
Dept: SURGERY | Facility: CLINIC | Age: 65
End: 2020-03-12
Payer: COMMERCIAL

## 2020-03-12 VITALS
DIASTOLIC BLOOD PRESSURE: 82 MMHG | RESPIRATION RATE: 15 BRPM | OXYGEN SATURATION: 95 % | HEART RATE: 78 BPM | SYSTOLIC BLOOD PRESSURE: 153 MMHG | TEMPERATURE: 97.5 F

## 2020-03-12 DIAGNOSIS — K43.2 INCISIONAL HERNIA W/OUT OBSTRUCTION OR GANGRENE: ICD-10-CM

## 2020-03-12 PROCEDURE — 99024 POSTOP FOLLOW-UP VISIT: CPT

## 2020-03-12 NOTE — ASSESSMENT
[FreeTextEntry1] : Discussed with the patient the induration in the operative area is what I would expect.  I have asked the patient to return in 6 weeks as this induration should slowly get better.

## 2020-03-12 NOTE — PHYSICAL EXAM
[de-identified] : There is a moderate amount of induration around the operative field.  There is no indication of fluid collection or inflammation or abscess.

## 2020-03-12 NOTE — HISTORY OF PRESENT ILLNESS
[de-identified] : Anthony is a 63 y/o male here for a post op visit following a repair of an incisional hernia with mesh. He was last seen on 2/27/20.  Patient has no complaints.  Tolerating a regular diet and having normal bowel function.

## 2020-05-05 ENCOUNTER — APPOINTMENT (OUTPATIENT)
Dept: SURGERY | Facility: CLINIC | Age: 65
End: 2020-05-05
Payer: COMMERCIAL

## 2020-05-26 ENCOUNTER — APPOINTMENT (OUTPATIENT)
Dept: SURGERY | Facility: CLINIC | Age: 65
End: 2020-05-26
Payer: COMMERCIAL

## 2020-05-26 VITALS
TEMPERATURE: 98.9 F | HEART RATE: 100 BPM | SYSTOLIC BLOOD PRESSURE: 167 MMHG | OXYGEN SATURATION: 95 % | DIASTOLIC BLOOD PRESSURE: 91 MMHG | RESPIRATION RATE: 16 BRPM

## 2020-05-26 DIAGNOSIS — Z09 ENCOUNTER FOR FOLLOW-UP EXAMINATION AFTER COMPLETED TREATMENT FOR CONDITIONS OTHER THAN MALIGNANT NEOPLASM: ICD-10-CM

## 2020-05-26 PROCEDURE — 99212 OFFICE O/P EST SF 10 MIN: CPT

## 2020-05-26 NOTE — ASSESSMENT
[FreeTextEntry1] : Patient states that he is having no symptoms from this.  I have told the patient that if this area starts to bother him or gets larger or changes in any way he needs to return for reevaluation.

## 2020-05-26 NOTE — HISTORY OF PRESENT ILLNESS
[de-identified] : Anthony is a 65 y/o male here for a post op visit following a repair of an incisional hernia with mesh. He was last seen on 03/12/20.  The patient states that the area of induration around the right lower quadrant ileostomy hernia site is markedly improved.  He states there is no pain or tenderness.  Patient is having normal bowel function.\par

## 2020-05-26 NOTE — PHYSICAL EXAM
[de-identified] : The indurated area around the right lower quadrant ileostomy hernia site is markedly improved there is a firm 1 to 2 cm nodule in the area which is residual.  There is no evidence of any recurrence or infection.

## 2020-06-23 ENCOUNTER — OUTPATIENT (OUTPATIENT)
Dept: OUTPATIENT SERVICES | Facility: HOSPITAL | Age: 65
LOS: 1 days | End: 2020-06-23
Payer: COMMERCIAL

## 2020-06-23 ENCOUNTER — APPOINTMENT (OUTPATIENT)
Dept: RADIOLOGY | Facility: CLINIC | Age: 65
End: 2020-06-23
Payer: COMMERCIAL

## 2020-06-23 ENCOUNTER — APPOINTMENT (OUTPATIENT)
Dept: NEUROSURGERY | Facility: CLINIC | Age: 65
End: 2020-06-23
Payer: COMMERCIAL

## 2020-06-23 VITALS
BODY MASS INDEX: 24.87 KG/M2 | OXYGEN SATURATION: 94 % | TEMPERATURE: 98.4 F | HEART RATE: 85 BPM | HEIGHT: 75 IN | DIASTOLIC BLOOD PRESSURE: 85 MMHG | SYSTOLIC BLOOD PRESSURE: 140 MMHG | WEIGHT: 200 LBS

## 2020-06-23 DIAGNOSIS — R20.2 PARESTHESIA OF SKIN: ICD-10-CM

## 2020-06-23 DIAGNOSIS — Z90.89 ACQUIRED ABSENCE OF OTHER ORGANS: Chronic | ICD-10-CM

## 2020-06-23 DIAGNOSIS — F41.9 ANXIETY DISORDER, UNSPECIFIED: ICD-10-CM

## 2020-06-23 DIAGNOSIS — Z98.890 OTHER SPECIFIED POSTPROCEDURAL STATES: Chronic | ICD-10-CM

## 2020-06-23 DIAGNOSIS — M41.9 SCOLIOSIS, UNSPECIFIED: ICD-10-CM

## 2020-06-23 DIAGNOSIS — Z90.49 ACQUIRED ABSENCE OF OTHER SPECIFIED PARTS OF DIGESTIVE TRACT: Chronic | ICD-10-CM

## 2020-06-23 PROCEDURE — 72082 X-RAY EXAM ENTIRE SPI 2/3 VW: CPT | Mod: 26

## 2020-06-23 PROCEDURE — 72110 X-RAY EXAM L-2 SPINE 4/>VWS: CPT

## 2020-06-23 PROCEDURE — 99203 OFFICE O/P NEW LOW 30 MIN: CPT

## 2020-06-23 PROCEDURE — 72082 X-RAY EXAM ENTIRE SPI 2/3 VW: CPT

## 2020-06-23 PROCEDURE — 72110 X-RAY EXAM L-2 SPINE 4/>VWS: CPT | Mod: 26,59

## 2020-06-23 RX ORDER — CYCLOBENZAPRINE HYDROCHLORIDE 5 MG/1
5 TABLET, FILM COATED ORAL 3 TIMES DAILY
Qty: 30 | Refills: 0 | Status: ACTIVE | COMMUNITY
Start: 2020-06-23 | End: 1900-01-01

## 2020-06-23 NOTE — CONSULT LETTER
[Dear  ___] : Dear  [unfilled], [Courtesy Letter:] : I had the pleasure of seeing your patient, [unfilled], in my office today. [Sincerely,] : Sincerely, [FreeTextEntry2] : Canelo Pickard MD\par [address]\par [place] [phone] [FreeTextEntry1] : Anthony Stout is a 65-year-old male with a past medical history of hypertension, currently on metoprolol and atorvastatin, who presents today with lumbar symptoms.  Patient states he has a history of scoliosis as a child.  No surgery was performed.  Patient states he has been struggling with lower back pain and paresthesias to bilateral legs and feet for a long time however it has significantly progressed within the past 3 weeks.  Patient reports a constant ache and discomfort with an occasional sharp 7/10 lower back pain.  He reports numbness and tingling and weakness to bilateral anterior and posterior distal legs and feet.  Patient states he is unsteady on his feet.  He is using a crutch for support.  He reports tripping over his feet.  He reports difficulties with lifting up feet.  He denies any radiating shooting pain.  He denies any bowel or bladder dysfunction.  He denies any saddle anesthesia.  He denies mechanical back pain.  Patient denies chest pain or shortness of breath.\par \par Patient denies any cervical pain.  Denies any arm pain, numbness, tingling, or weakness.  He denies dropping of objects from his hands.\par \par There is no imaging to review with the patient.\par \par Patient is alert and oriented.  No distress noted.  Negative Elena's.  Strength to bilateral arms 5/5.  Reflexes to bilateral arms equal and normal.  Sensation to light touch to bilateral arms symmetric and normal.  Negative clonus.  Strength to bilateral legs and feet 5/5.  Bilateral patellar reflexes present and equal.  Bilateral Achilles tendon reflexes absent.  Absent pinprick and temperature sensation noted to bilateral dorsal feet.  Discoloration noted to bilateral legs and feet.  Patient states this is normal.  Normal blanching noted to bilateral feet and legs.  Good capillary refill noted.  1+ pedal pulse noted to the left foot.  2+ pedal pulse noted to the right foot.  Patient denies pain with palpation to bilateral calves.  Patient is having difficulties with walking.  Patient is walking with a crutch for stability.  He is walking with a wide gait.\par \par I provided  the patient with an x-ray of the lumbar spine and scoliosis x-ray.  I have also provided the patient with an Rx for an MRI of the lumbar spine.  Given the patient's past history the patient may benefit from a vascular consult.  I have also provided the patient with a prescription for Flexeril.  I have reviewed side effects of the medication with the patient.  Patient aware to go to the emergency room should he develop any saddle anesthesia, bowel or bladder incontinence, or any worsening symptoms.  We will follow-up in office once imaging is complete.\par \par Addendum 6/23/20- Reviewed scoliosis x-ray and x-ray of the lumbar spine performed on 6/23/2020 with patient.  Report indicates thoracic scoliosis and moderate level scoliosis and moderate spondylosis. [FreeTextEntry3] : Kelly Ivy, MSN, FNP-BC\par Nurse Practitioner\par Neurosurgery\par [address] [place] [address], 2nd floor \[place] [address] \par Office: [phone] \par Fax: [phone]\par \par

## 2020-06-25 PROBLEM — F41.9 ANXIETY: Status: ACTIVE | Noted: 2020-06-25

## 2020-06-25 RX ORDER — LORAZEPAM 1 MG/1
1 TABLET ORAL
Qty: 2 | Refills: 0 | Status: ACTIVE | COMMUNITY
Start: 2020-06-25 | End: 1900-01-01

## 2020-06-26 ENCOUNTER — APPOINTMENT (OUTPATIENT)
Dept: MRI IMAGING | Facility: CLINIC | Age: 65
End: 2020-06-26
Payer: COMMERCIAL

## 2020-06-26 ENCOUNTER — OUTPATIENT (OUTPATIENT)
Dept: OUTPATIENT SERVICES | Facility: HOSPITAL | Age: 65
LOS: 1 days | End: 2020-06-26
Payer: COMMERCIAL

## 2020-06-26 DIAGNOSIS — R29.898 OTHER SYMPTOMS AND SIGNS INVOLVING THE MUSCULOSKELETAL SYSTEM: ICD-10-CM

## 2020-06-26 DIAGNOSIS — M54.5 LOW BACK PAIN: ICD-10-CM

## 2020-06-26 DIAGNOSIS — Z90.49 ACQUIRED ABSENCE OF OTHER SPECIFIED PARTS OF DIGESTIVE TRACT: Chronic | ICD-10-CM

## 2020-06-26 DIAGNOSIS — Z90.89 ACQUIRED ABSENCE OF OTHER ORGANS: Chronic | ICD-10-CM

## 2020-06-26 DIAGNOSIS — Z98.890 OTHER SPECIFIED POSTPROCEDURAL STATES: Chronic | ICD-10-CM

## 2020-06-26 DIAGNOSIS — M41.9 SCOLIOSIS, UNSPECIFIED: ICD-10-CM

## 2020-06-26 PROCEDURE — 72148 MRI LUMBAR SPINE W/O DYE: CPT

## 2020-06-26 PROCEDURE — 72148 MRI LUMBAR SPINE W/O DYE: CPT | Mod: 26

## 2020-06-30 RX ORDER — MELOXICAM 7.5 MG/1
7.5 TABLET ORAL
Qty: 30 | Refills: 0 | Status: ACTIVE | COMMUNITY
Start: 2020-06-30 | End: 1900-01-01

## 2020-07-01 ENCOUNTER — APPOINTMENT (OUTPATIENT)
Dept: DERMATOLOGY | Facility: CLINIC | Age: 65
End: 2020-07-01
Payer: COMMERCIAL

## 2020-07-01 VITALS — BODY MASS INDEX: 24.87 KG/M2 | WEIGHT: 200 LBS | HEIGHT: 75 IN

## 2020-07-01 PROCEDURE — 17000 DESTRUCT PREMALG LESION: CPT | Mod: 59

## 2020-07-01 PROCEDURE — 11102 TANGNTL BX SKIN SINGLE LES: CPT

## 2020-07-01 PROCEDURE — 17003 DESTRUCT PREMALG LES 2-14: CPT

## 2020-07-01 PROCEDURE — 11103 TANGNTL BX SKIN EA SEP/ADDL: CPT

## 2020-07-01 PROCEDURE — 99213 OFFICE O/P EST LOW 20 MIN: CPT | Mod: 25

## 2020-07-01 NOTE — PHYSICAL EXAM
[Oriented x 3] : ~L oriented x 3 [Alert] : alert [Well Nourished] : well nourished [Full Body Skin Exam Performed] : performed [FreeTextEntry3] : A full skin exam was performed including the scalp, face (including lips, ears, nose and eyes), neck, chest, abdomen, back, buttocks, upper extremities and lower extremities.  The patient declined examination of the genitalia.  \par The exam revealed the following benign growths:\par [place] pigmented nevi.\par Lentigines.\par \par Erosion of the right forehead.\par Keratotic volcanic indurated papule, right shoulder.\par Erosion with crust - plaque of the right distal shin.\par \par keratotic papules, left dorsal hand x 3.

## 2020-07-01 NOTE — ASSESSMENT
[FreeTextEntry1] : A complete skin examination was performed.  We discussed the importance of photoprotection, including the use of hats, protective clothing and sunscreens with an SPF of at least 30.  Sun avoidance was also discussed.  The ABCDE's of melanoma was discussed with the patient.  Regular skin exams are encouraged.\par \par R/O BCC of the right forehead.  D&C if positive.\par R/O KA vs. SCC of the right shoulder.  Plan excision if positive.\par R/o SCC of the right shin.  Surgery referral if positive.

## 2020-07-01 NOTE — HISTORY OF PRESENT ILLNESS
[FreeTextEntry1] : Patient presents for skin examination. [de-identified] : Notes lesion on the rigth shoulder, present for a month or two.  Growing, but no tenderness.  no bleeding.\par Also with lesion on the right shin.  Present x months, self tx with aquaphor, but will not heal.  no tenderness.

## 2020-07-07 ENCOUNTER — APPOINTMENT (OUTPATIENT)
Dept: NEUROSURGERY | Facility: CLINIC | Age: 65
End: 2020-07-07
Payer: COMMERCIAL

## 2020-07-07 VITALS
WEIGHT: 200 LBS | HEART RATE: 106 BPM | TEMPERATURE: 97.6 F | SYSTOLIC BLOOD PRESSURE: 137 MMHG | OXYGEN SATURATION: 96 % | BODY MASS INDEX: 24.87 KG/M2 | DIASTOLIC BLOOD PRESSURE: 79 MMHG | HEIGHT: 75 IN

## 2020-07-07 DIAGNOSIS — M41.9 SCOLIOSIS, UNSPECIFIED: ICD-10-CM

## 2020-07-07 DIAGNOSIS — Z87.39 PERSONAL HISTORY OF OTHER DISEASES OF THE MUSCULOSKELETAL SYSTEM AND CONNECTIVE TISSUE: ICD-10-CM

## 2020-07-07 DIAGNOSIS — G82.20 PARAPLEGIA, UNSPECIFIED: ICD-10-CM

## 2020-07-07 PROCEDURE — 99215 OFFICE O/P EST HI 40 MIN: CPT

## 2020-07-08 LAB — CORE LAB BIOPSY: NORMAL

## 2020-07-09 NOTE — CONSULT LETTER
[Dear  ___] : Dear  [unfilled], [Courtesy Letter:] : I had the pleasure of seeing your patient, [unfilled], in my office today. [Sincerely,] : Sincerely, [FreeTextEntry2] : Canelo Pickard MD\par [address]\par [place] [phone]  [FreeTextEntry1] : Mr. Stout is a very pleasant 65-year-old male patient who was seen in our office today in regards to chronic low back pain and scoliosis.  The patient returns today to review his imaging findings.\par \par The patient has had a longstanding history of low back pain dating back to when he was a child and diagnosed with scoliosis.  The patient has never been followed for scoliotic deformity.  The patient is always managed his low back pain well with conservative therapy until recently.  In the last several weeks, the patient has noticed a significant worsening of both his low back pain as well as numbness in a glove and stocking distribution in his feet.  The patient denies any radicular pain but states that, in addition to his low back pain he feels like he is being "pushed forward".  The patient states that this sensation makes walking difficult.  The patient denies any new bowel/bladder symptoms or saddle anesthesia.\par \par On examination, the patient has a clear kyphotic deformity when he stands.  However, the patient does demonstrate 5/5 strength in the lower extremities bilaterally.  The patient ambulates with a stooped posture and ambulates slowly.\par \par The patient is accompanied with several images which were reviewed today.  The patient's MRI scan of the lumbar spine dated June 26 demonstrates a significant rotatory and kyphotic scoliosis in the thoracolumbar spine.  There are several levels of lateral recess and foraminal stenosis worst at L5/S1 on the left.  The patient's scoliosis views performed on June 23, 2020 demonstrate good coronal balance measuring 0 cm.  The patient's sagittal views however demonstrate a positive sagittal imbalance of approximately 20 cm.  The primary concern here on the sagittal view is the focal kyphosis in the lower thoracic spine.  In an attempt to correct for this, the rest of the patient's thoracic spine actually has a mild lordosis.\par \par Taken together, the patient has a clinical history and radiographic findings most consistent with chronic low back pain secondary to musculoskeletal causes as a result of his rotatory kyphoscoliosis.  Of the deformities in his back, the most concerning is his kyphosis which causes him to pitch forward and is hampering his ability to walk.  I have explained to the patient and his wife that surgical intervention to correct this problem would be directed at reducing his kyphosis as opposed to correcting his coronal scoliosis.  However, any surgical undertaking in this situation would be quite extensive requiring at least a midthoracic to pelvic fusion.  At this time, the patient would like to attempt any and all conservative management therapies to avoid surgical intervention which I think is very reasonable.  As such, I have recommended a customized brace as well as physical therapy for symptomatic relief.  In addition, I have recommended an EMG/nerve conduction study as his description of glove and stocking distribution numbness is a bit unusual in this context.  I have recommended follow-up in 6 weeks with x-rays to evaluate his progress though I suspect nothing will change dramatically from a radiologic perspective.\par  [FreeTextEntry3] : Juan Simons MD, PhD, FRCPSC \par Attending Neurosurgeon \par [facility] \par [address], 2nd floor \[place] [address] \par Office: [phone] \par Fax: [phone]\par \par

## 2020-07-29 ENCOUNTER — APPOINTMENT (OUTPATIENT)
Dept: NEUROLOGY | Facility: CLINIC | Age: 65
End: 2020-07-29
Payer: COMMERCIAL

## 2020-07-29 PROCEDURE — 95886 MUSC TEST DONE W/N TEST COMP: CPT

## 2020-07-29 PROCEDURE — 95910 NRV CNDJ TEST 7-8 STUDIES: CPT

## 2020-07-29 NOTE — HISTORY OF PRESENT ILLNESS
[FreeTextEntry1] : 65-year-old man complaining of chronic back pain, numbness, sensations of both feet.

## 2020-07-29 NOTE — DISCUSSION/SUMMARY
[FreeTextEntry1] : 65-year-old man, referred for elective artery nerve conduction study c, examination, demonstrates evidence of a axonal polyneuropathy. Evidence also of a bilateral lumbar radiculopathy.\par Patient made aware of study findings. Advised to followup with neurosurgery.

## 2020-07-29 NOTE — PHYSICAL EXAM
[Person] : oriented to person [Fluency] : fluency intact [Cranial Nerves Optic (II)] : visual acuity intact bilaterally,  visual fields full to confrontation, pupils equal round and reactive to light [Cranial Nerves Oculomotor (III)] : extraocular motion intact [Comprehension] : comprehension intact [Cranial Nerves Facial (VII)] : face symmetrical [Cranial Nerves Accessory (XI - Cranial And Spinal)] : head turning and shoulder shrug symmetric [Cranial Nerves Vestibulocochlear (VIII)] : hearing was intact bilaterally [Motor Handedness Right-Handed] : the patient is right hand dominant [1+] : Brachioradialis left 1+ [0] : Ankle jerk left 0 [FreeTextEntry7] : Reduced distally

## 2020-08-02 NOTE — H&P PST ADULT - HEIGHT IN FEET
Patient received from ED accompanied by staff to room 308.  Patient assisted to bed and made 
comfortable.

Assessment and nursing data base completed. Oriented to room and call light system. Patient 
sent to CT

scan after admission to unit. Consent signed by pt prior to procedure and in paper chart. 

No new variance at this time.  Patient is in stable condition.  Will continue to monitor. 6

## 2020-08-04 ENCOUNTER — APPOINTMENT (OUTPATIENT)
Dept: DERMATOLOGY | Facility: CLINIC | Age: 65
End: 2020-08-04
Payer: COMMERCIAL

## 2020-08-04 VITALS — HEIGHT: 75 IN | BODY MASS INDEX: 24.87 KG/M2 | WEIGHT: 200 LBS

## 2020-08-04 DIAGNOSIS — Z85.828 PERSONAL HISTORY OF OTHER MALIGNANT NEOPLASM OF SKIN: ICD-10-CM

## 2020-08-04 DIAGNOSIS — Z85.89 PERSONAL HISTORY OF MALIGNANT NEOPLASM OF OTHER ORGANS AND SYSTEMS: ICD-10-CM

## 2020-08-04 PROCEDURE — 11602 EXC TR-EXT MAL+MARG 1.1-2 CM: CPT

## 2020-08-04 PROCEDURE — 12032 INTMD RPR S/A/T/EXT 2.6-7.5: CPT

## 2020-08-04 RX ORDER — IMIQUIMOD 50 MG/G
5 CREAM TOPICAL
Qty: 1 | Refills: 2 | Status: ACTIVE | COMMUNITY
Start: 2020-08-04 | End: 1900-01-01

## 2020-08-05 ENCOUNTER — APPOINTMENT (OUTPATIENT)
Dept: DERMATOLOGY | Facility: CLINIC | Age: 65
End: 2020-08-05
Payer: COMMERCIAL

## 2020-08-05 VITALS — TEMPERATURE: 97.1 F

## 2020-08-05 PROCEDURE — 99214 OFFICE O/P EST MOD 30 MIN: CPT | Mod: 24

## 2020-08-13 ENCOUNTER — APPOINTMENT (OUTPATIENT)
Dept: DERMATOLOGY | Facility: CLINIC | Age: 65
End: 2020-08-13

## 2020-08-18 ENCOUNTER — APPOINTMENT (OUTPATIENT)
Dept: DERMATOLOGY | Facility: CLINIC | Age: 65
End: 2020-08-18
Payer: COMMERCIAL

## 2020-08-18 DIAGNOSIS — C44.622 SQUAMOUS CELL CARCINOMA OF SKIN OF RIGHT UPPER LIMB, INCLUDING SHOULDER: ICD-10-CM

## 2020-08-18 LAB — CORE LAB BIOPSY: NORMAL

## 2020-08-18 PROCEDURE — 99024 POSTOP FOLLOW-UP VISIT: CPT

## 2020-08-18 NOTE — HISTORY OF PRESENT ILLNESS
[FreeTextEntry1] : Suture removal. [de-identified] : Right shoulder well healed, without evidence of infection.\par Sutures removed.\par Path with clear margins.\par f/u in 3 months for TBSE.

## 2020-09-01 ENCOUNTER — APPOINTMENT (OUTPATIENT)
Dept: DERMATOLOGY | Facility: CLINIC | Age: 65
End: 2020-09-01
Payer: COMMERCIAL

## 2020-09-01 DIAGNOSIS — C44.712 BASAL CELL CARCINOMA OF SKIN OF RIGHT LOWER LIMB, INCLUDING HIP: ICD-10-CM

## 2020-09-01 PROCEDURE — 17313 MOHS 1 STAGE T/A/L: CPT

## 2020-09-01 PROCEDURE — 12032 INTMD RPR S/A/T/EXT 2.6-7.5: CPT

## 2020-09-15 ENCOUNTER — APPOINTMENT (OUTPATIENT)
Dept: DERMATOLOGY | Facility: CLINIC | Age: 65
End: 2020-09-15
Payer: COMMERCIAL

## 2020-09-15 VITALS — TEMPERATURE: 98 F

## 2020-09-15 DIAGNOSIS — C44.712 BASAL CELL CARCINOMA OF SKIN OF RIGHT LOWER LIMB, INCLUDING HIP: ICD-10-CM

## 2020-09-15 PROCEDURE — 99024 POSTOP FOLLOW-UP VISIT: CPT

## 2020-09-24 ENCOUNTER — APPOINTMENT (OUTPATIENT)
Dept: NEUROSURGERY | Facility: CLINIC | Age: 65
End: 2020-09-24

## 2020-10-15 ENCOUNTER — APPOINTMENT (OUTPATIENT)
Dept: DERMATOLOGY | Facility: CLINIC | Age: 65
End: 2020-10-15
Payer: COMMERCIAL

## 2020-10-15 VITALS — WEIGHT: 197 LBS | HEIGHT: 75 IN | BODY MASS INDEX: 24.49 KG/M2

## 2020-10-15 DIAGNOSIS — L21.9 SEBORRHEIC DERMATITIS, UNSPECIFIED: ICD-10-CM

## 2020-10-15 DIAGNOSIS — L73.9 FOLLICULAR DISORDER, UNSPECIFIED: ICD-10-CM

## 2020-10-15 DIAGNOSIS — C44.320 SQUAMOUS CELL CARCINOMA OF SKIN OF UNSPECIFIED PARTS OF FACE: ICD-10-CM

## 2020-10-15 PROCEDURE — 99213 OFFICE O/P EST LOW 20 MIN: CPT

## 2020-10-15 RX ORDER — CLINDAMYCIN PHOSPHATE 1 G/10ML
1 GEL TOPICAL TWICE DAILY
Qty: 1 | Refills: 2 | Status: ACTIVE | COMMUNITY
Start: 2020-10-15 | End: 1900-01-01

## 2020-10-15 NOTE — PHYSICAL EXAM
[Oriented x 3] : ~L oriented x 3 [Alert] : alert [Well Nourished] : well nourished [Declined] : declined [Eyelids] : Eyelids [Ears] : Ears [Lips] : Lips [Neck] : Neck [FreeTextEntry3] : erythematous patches with mild scale, bilateral lateral lower eyelid/malar regions.\par \par Follicular papules/pustules of the bilateral forearms.

## 2020-10-15 NOTE — ASSESSMENT
[FreeTextEntry1] : SCC s/p tx of the right forehead.\par Excellent response to imiquimod.\par Will follow clinically.\par \par Folliculitis and seb derm\par Use clindamycin gel bid.\par Call if persists.

## 2020-10-15 NOTE — HISTORY OF PRESENT ILLNESS
[FreeTextEntry1] : Patient fit in for rash of the periorbital regions, and forearms. [de-identified] : Red patches on the periorbital regions.\par Also with bumps of the forearms.  \par Irritation of both sites, without itching.  No self tx.\par \par Patient s/p Mohs on the leg, well healed.\par \par Patient s/p imiquimod of the right forehead for SCC.  Very happy with response.

## 2021-04-12 ENCOUNTER — APPOINTMENT (OUTPATIENT)
Dept: DERMATOLOGY | Facility: CLINIC | Age: 66
End: 2021-04-12
Payer: COMMERCIAL

## 2021-04-12 ENCOUNTER — APPOINTMENT (OUTPATIENT)
Dept: DERMATOLOGY | Facility: CLINIC | Age: 66
End: 2021-04-12

## 2021-04-12 PROCEDURE — 17000 DESTRUCT PREMALG LESION: CPT

## 2021-04-12 PROCEDURE — 99072 ADDL SUPL MATRL&STAF TM PHE: CPT

## 2021-04-12 PROCEDURE — 17003 DESTRUCT PREMALG LES 2-14: CPT

## 2021-04-12 PROCEDURE — 99213 OFFICE O/P EST LOW 20 MIN: CPT | Mod: 25

## 2021-04-12 RX ORDER — CEPHALEXIN 500 MG/1
500 CAPSULE ORAL
Qty: 14 | Refills: 0 | Status: DISCONTINUED | COMMUNITY
Start: 2020-09-01 | End: 2021-04-12

## 2021-04-12 NOTE — PHYSICAL EXAM
[Alert] : alert [Oriented x 3] : ~L oriented x 3 [Well Nourished] : well nourished [Full Body Skin Exam Performed] : performed [FreeTextEntry3] : A full skin exam was performed including the scalp, face (including lips, ears, nose and eyes), neck, chest, abdomen, back, buttocks, upper extremities and lower extremities.  The patient declined examination of the genitalia.  \par The exam revealed the following benign growths:\par Seborrheic keratoses.\par Lentigines.\par \par Keratotic papules, back x 6, right shoulder, face x 4.\par

## 2021-04-12 NOTE — HISTORY OF PRESENT ILLNESS
[FreeTextEntry1] : Patient presents for skin examination. [de-identified] : Denies new, changing, bleeding or tender lesions on the skin over the past 6 months.\par

## 2021-10-13 ENCOUNTER — APPOINTMENT (OUTPATIENT)
Dept: DERMATOLOGY | Facility: CLINIC | Age: 66
End: 2021-10-13
Payer: COMMERCIAL

## 2021-10-13 DIAGNOSIS — L23.9 ALLERGIC CONTACT DERMATITIS, UNSPECIFIED CAUSE: ICD-10-CM

## 2021-10-13 PROCEDURE — 17003 DESTRUCT PREMALG LES 2-14: CPT

## 2021-10-13 PROCEDURE — 99213 OFFICE O/P EST LOW 20 MIN: CPT | Mod: 25

## 2021-10-13 PROCEDURE — 17000 DESTRUCT PREMALG LESION: CPT

## 2021-10-13 RX ORDER — FLUTICASONE PROPIONATE 0.5 MG/G
0.05 CREAM TOPICAL TWICE DAILY
Qty: 1 | Refills: 2 | Status: ACTIVE | COMMUNITY
Start: 2021-10-13 | End: 1900-01-01

## 2021-10-13 NOTE — PHYSICAL EXAM
[Alert] : alert [Oriented x 3] : ~L oriented x 3 [Well Nourished] : well nourished [Full Body Skin Exam Performed] : performed [FreeTextEntry3] : A full skin exam was performed including the scalp, face (including lips, ears, nose and eyes), neck, chest, abdomen, back, buttocks, upper extremities and lower extremities.  The patient declined examination of the genitalia.  \par The exam revealed the following benign growths:\par Seborrheic keratoses.\par Lentigines.\par \par Erythematous patch with mild scale-crust of the right dorsal foot.\par \par Keratotic papules, right upper cheek, left malar x 2, left forehead x 5 and left temple.

## 2021-10-13 NOTE — HISTORY OF PRESENT ILLNESS
[FreeTextEntry1] : Patient presents for skin examination. [de-identified] : Notes rash of the right foot, for 5 months, persistent, but with minimal symptoms.  Self tx with OTC emollients without improvement.

## 2021-10-13 NOTE — ASSESSMENT
[FreeTextEntry1] : A complete skin examination was performed.  There is no evidence of skin cancer.  We discussed the importance of photoprotection, including the use of hats, protective clothing and sunscreens with an SPF of at least 30.  Sun avoidance was also discussed.  The ABCDE's of melanoma was discussed.  Regular skin exams recommended.\par \par Possible contact dermatitis\par Cutivate cream bid.\par If persists, patient to call, consider patch testing.

## 2021-12-28 NOTE — DISCHARGE NOTE PROVIDER - NSDCQMSTAIRS_GEN_ALL_CORE
Spoke with APC on site:  Teresa Winters APC    Dose instructions as follows: take 3 mg daily     Relayed dose instructions and next INR date.    
No

## 2022-04-13 ENCOUNTER — APPOINTMENT (OUTPATIENT)
Dept: DERMATOLOGY | Facility: CLINIC | Age: 67
End: 2022-04-13
Payer: COMMERCIAL

## 2022-04-13 PROCEDURE — 17003 DESTRUCT PREMALG LES 2-14: CPT

## 2022-04-13 PROCEDURE — 99213 OFFICE O/P EST LOW 20 MIN: CPT | Mod: 25

## 2022-04-13 PROCEDURE — 17000 DESTRUCT PREMALG LESION: CPT

## 2022-04-13 NOTE — HISTORY OF PRESENT ILLNESS
[FreeTextEntry1] : Patient presents for skin examination. [de-identified] : Denies new, changing, bleeding or tender lesions on the skin over the past 6 months.\par

## 2022-04-13 NOTE — PHYSICAL EXAM
[Alert] : alert [Oriented x 3] : ~L oriented x 3 [Well Nourished] : well nourished [Full Body Skin Exam Performed] : performed [FreeTextEntry3] : A full skin exam was performed including the scalp, face (including lips, ears, nose and eyes), neck, chest, abdomen, back, buttocks, upper extremities and lower extremities.  The patient declined examination of the genitalia.  \par The exam revealed the following benign growths:\par [place] pigmented nevi.\par Lentigines.\par \par keratotic papules, right upper arm x 2, left forearm, left dorsal hand x 3.

## 2022-10-24 ENCOUNTER — APPOINTMENT (OUTPATIENT)
Dept: DERMATOLOGY | Facility: CLINIC | Age: 67
End: 2022-10-24

## 2022-10-24 PROCEDURE — 17000 DESTRUCT PREMALG LESION: CPT | Mod: 59

## 2022-10-24 PROCEDURE — 11102 TANGNTL BX SKIN SINGLE LES: CPT

## 2022-10-24 PROCEDURE — 17003 DESTRUCT PREMALG LES 2-14: CPT

## 2022-10-24 PROCEDURE — 99213 OFFICE O/P EST LOW 20 MIN: CPT | Mod: 25

## 2022-10-24 PROCEDURE — 11103 TANGNTL BX SKIN EA SEP/ADDL: CPT

## 2022-10-24 NOTE — ASSESSMENT
[FreeTextEntry1] : A complete skin examination was performed.  We discussed the importance of photoprotection, including the use of hats, protective clothing and sunscreens with an SPF of at least 30.  Sun avoidance was also discussed.  The ABCDE's of melanoma was discussed with the patient.  Regular skin exams are encouraged.\par \par R/O BCC of the back.\par Plan D&C if positive.\par \par R/O SCC of the left shin.\par Plan D&C if in situ, excision if invasive.

## 2022-10-24 NOTE — PHYSICAL EXAM
[Alert] : alert [Oriented x 3] : ~L oriented x 3 [Well Nourished] : well nourished [Full Body Skin Exam Performed] : performed [FreeTextEntry3] : A full skin exam was performed including the scalp, face (including lips, ears, nose and eyes), neck, chest, abdomen, back, buttocks, upper extremities and lower extremities.  The patient declined examination of the genitalia.  \par The exam revealed the following benign growths:\par Seborrheic keratoses.\par Lentigines.\par \par erythematous pearly macule of the mid-back.\par Erythematous patch with crust, left distal shin.\par \par keratotic papules of the right UE x 5, left UE x 6.

## 2022-10-24 NOTE — HISTORY OF PRESENT ILLNESS
[FreeTextEntry1] : Patient presents for skin examination. [de-identified] : Denies new, changing, bleeding or tender lesions on the skin over the past 6 months.\par

## 2022-10-27 LAB — CORE LAB BIOPSY: NORMAL

## 2022-11-29 ENCOUNTER — INPATIENT (INPATIENT)
Facility: HOSPITAL | Age: 67
LOS: 6 days | Discharge: HOME | DRG: 255 | End: 2022-12-06
Attending: INTERNAL MEDICINE | Admitting: INTERNAL MEDICINE
Payer: COMMERCIAL

## 2022-11-29 VITALS
RESPIRATION RATE: 14 BRPM | OXYGEN SATURATION: 93 % | HEART RATE: 112 BPM | SYSTOLIC BLOOD PRESSURE: 131 MMHG | TEMPERATURE: 102 F | DIASTOLIC BLOOD PRESSURE: 79 MMHG

## 2022-11-29 DIAGNOSIS — F41.9 ANXIETY DISORDER, UNSPECIFIED: ICD-10-CM

## 2022-11-29 DIAGNOSIS — I96 GANGRENE, NOT ELSEWHERE CLASSIFIED: ICD-10-CM

## 2022-11-29 DIAGNOSIS — I10 ESSENTIAL (PRIMARY) HYPERTENSION: ICD-10-CM

## 2022-11-29 DIAGNOSIS — M86.9 OSTEOMYELITIS, UNSPECIFIED: ICD-10-CM

## 2022-11-29 DIAGNOSIS — L97.519 NON-PRESSURE CHRONIC ULCER OF OTHER PART OF RIGHT FOOT WITH UNSPECIFIED SEVERITY: ICD-10-CM

## 2022-11-29 DIAGNOSIS — Z98.890 OTHER SPECIFIED POSTPROCEDURAL STATES: Chronic | ICD-10-CM

## 2022-11-29 DIAGNOSIS — R94.31 ABNORMAL ELECTROCARDIOGRAM [ECG] [EKG]: ICD-10-CM

## 2022-11-29 DIAGNOSIS — Z90.49 ACQUIRED ABSENCE OF OTHER SPECIFIED PARTS OF DIGESTIVE TRACT: Chronic | ICD-10-CM

## 2022-11-29 DIAGNOSIS — Z79.82 LONG TERM (CURRENT) USE OF ASPIRIN: ICD-10-CM

## 2022-11-29 DIAGNOSIS — G62.9 POLYNEUROPATHY, UNSPECIFIED: ICD-10-CM

## 2022-11-29 DIAGNOSIS — E78.5 HYPERLIPIDEMIA, UNSPECIFIED: ICD-10-CM

## 2022-11-29 DIAGNOSIS — I45.10 UNSPECIFIED RIGHT BUNDLE-BRANCH BLOCK: ICD-10-CM

## 2022-11-29 DIAGNOSIS — B95.4 OTHER STREPTOCOCCUS AS THE CAUSE OF DISEASES CLASSIFIED ELSEWHERE: ICD-10-CM

## 2022-11-29 DIAGNOSIS — Z85.038 PERSONAL HISTORY OF OTHER MALIGNANT NEOPLASM OF LARGE INTESTINE: ICD-10-CM

## 2022-11-29 DIAGNOSIS — M41.9 SCOLIOSIS, UNSPECIFIED: ICD-10-CM

## 2022-11-29 DIAGNOSIS — R78.81 BACTEREMIA: ICD-10-CM

## 2022-11-29 DIAGNOSIS — L02.611 CUTANEOUS ABSCESS OF RIGHT FOOT: ICD-10-CM

## 2022-11-29 DIAGNOSIS — A48.0 GAS GANGRENE: ICD-10-CM

## 2022-11-29 DIAGNOSIS — L03.031 CELLULITIS OF RIGHT TOE: ICD-10-CM

## 2022-11-29 DIAGNOSIS — Z90.89 ACQUIRED ABSENCE OF OTHER ORGANS: Chronic | ICD-10-CM

## 2022-11-29 DIAGNOSIS — Z92.3 PERSONAL HISTORY OF IRRADIATION: ICD-10-CM

## 2022-11-29 LAB
ALBUMIN SERPL ELPH-MCNC: 2.5 G/DL — LOW (ref 3.3–5)
ALP SERPL-CCNC: 74 U/L — SIGNIFICANT CHANGE UP (ref 40–120)
ALT FLD-CCNC: 34 U/L — SIGNIFICANT CHANGE UP (ref 12–78)
ANION GAP SERPL CALC-SCNC: 5 MMOL/L — SIGNIFICANT CHANGE UP (ref 5–17)
APPEARANCE UR: CLEAR — SIGNIFICANT CHANGE UP
APTT BLD: 30.7 SEC — SIGNIFICANT CHANGE UP (ref 27.5–35.5)
AST SERPL-CCNC: 41 U/L — HIGH (ref 15–37)
BASOPHILS # BLD AUTO: 0.05 K/UL — SIGNIFICANT CHANGE UP (ref 0–0.2)
BASOPHILS NFR BLD AUTO: 0.3 % — SIGNIFICANT CHANGE UP (ref 0–2)
BILIRUB SERPL-MCNC: 0.8 MG/DL — SIGNIFICANT CHANGE UP (ref 0.2–1.2)
BILIRUB UR-MCNC: NEGATIVE — SIGNIFICANT CHANGE UP
BUN SERPL-MCNC: 9 MG/DL — SIGNIFICANT CHANGE UP (ref 7–23)
CALCIUM SERPL-MCNC: 8.7 MG/DL — SIGNIFICANT CHANGE UP (ref 8.5–10.1)
CHLORIDE SERPL-SCNC: 100 MMOL/L — SIGNIFICANT CHANGE UP (ref 96–108)
CO2 SERPL-SCNC: 29 MMOL/L — SIGNIFICANT CHANGE UP (ref 22–31)
COLOR SPEC: YELLOW — SIGNIFICANT CHANGE UP
CREAT SERPL-MCNC: 0.9 MG/DL — SIGNIFICANT CHANGE UP (ref 0.5–1.3)
DIFF PNL FLD: ABNORMAL
EGFR: 92 ML/MIN/1.73M2 — SIGNIFICANT CHANGE UP
EOSINOPHIL # BLD AUTO: 0 K/UL — SIGNIFICANT CHANGE UP (ref 0–0.5)
EOSINOPHIL NFR BLD AUTO: 0 % — SIGNIFICANT CHANGE UP (ref 0–6)
ERYTHROCYTE [SEDIMENTATION RATE] IN BLOOD: 89 MM/HR — HIGH (ref 0–20)
FLUAV AG NPH QL: SIGNIFICANT CHANGE UP
FLUBV AG NPH QL: SIGNIFICANT CHANGE UP
GLUCOSE SERPL-MCNC: 130 MG/DL — HIGH (ref 70–99)
GLUCOSE UR QL: NEGATIVE — SIGNIFICANT CHANGE UP
HCT VFR BLD CALC: 41.7 % — SIGNIFICANT CHANGE UP (ref 39–50)
HGB BLD-MCNC: 14.5 G/DL — SIGNIFICANT CHANGE UP (ref 13–17)
IMM GRANULOCYTES NFR BLD AUTO: 0.6 % — SIGNIFICANT CHANGE UP (ref 0–0.9)
INR BLD: 1.36 RATIO — HIGH (ref 0.88–1.16)
KETONES UR-MCNC: ABNORMAL
LACTATE SERPL-SCNC: 1.3 MMOL/L — SIGNIFICANT CHANGE UP (ref 0.7–2)
LEUKOCYTE ESTERASE UR-ACNC: ABNORMAL
LYMPHOCYTES # BLD AUTO: 0.77 K/UL — LOW (ref 1–3.3)
LYMPHOCYTES # BLD AUTO: 5.1 % — LOW (ref 13–44)
MCHC RBC-ENTMCNC: 34.8 GM/DL — SIGNIFICANT CHANGE UP (ref 32–36)
MCHC RBC-ENTMCNC: 34.8 PG — HIGH (ref 27–34)
MCV RBC AUTO: 100 FL — SIGNIFICANT CHANGE UP (ref 80–100)
MONOCYTES # BLD AUTO: 1.5 K/UL — HIGH (ref 0–0.9)
MONOCYTES NFR BLD AUTO: 9.9 % — SIGNIFICANT CHANGE UP (ref 2–14)
NEUTROPHILS # BLD AUTO: 12.77 K/UL — HIGH (ref 1.8–7.4)
NEUTROPHILS NFR BLD AUTO: 84.1 % — HIGH (ref 43–77)
NITRITE UR-MCNC: NEGATIVE — SIGNIFICANT CHANGE UP
PH UR: 5 — SIGNIFICANT CHANGE UP (ref 5–8)
PLATELET # BLD AUTO: 363 K/UL — SIGNIFICANT CHANGE UP (ref 150–400)
POTASSIUM SERPL-MCNC: 3.7 MMOL/L — SIGNIFICANT CHANGE UP (ref 3.5–5.3)
POTASSIUM SERPL-SCNC: 3.7 MMOL/L — SIGNIFICANT CHANGE UP (ref 3.5–5.3)
PROT SERPL-MCNC: 8.6 GM/DL — HIGH (ref 6–8.3)
PROT UR-MCNC: 30 MG/DL
PROTHROM AB SERPL-ACNC: 15.8 SEC — HIGH (ref 10.5–13.4)
RBC # BLD: 4.17 M/UL — LOW (ref 4.2–5.8)
RBC # FLD: 12 % — SIGNIFICANT CHANGE UP (ref 10.3–14.5)
RSV RNA NPH QL NAA+NON-PROBE: SIGNIFICANT CHANGE UP
SARS-COV-2 RNA SPEC QL NAA+PROBE: SIGNIFICANT CHANGE UP
SODIUM SERPL-SCNC: 134 MMOL/L — LOW (ref 135–145)
SP GR SPEC: 1.02 — SIGNIFICANT CHANGE UP (ref 1.01–1.02)
UROBILINOGEN FLD QL: 1
WBC # BLD: 15.18 K/UL — HIGH (ref 3.8–10.5)
WBC # FLD AUTO: 15.18 K/UL — HIGH (ref 3.8–10.5)

## 2022-11-29 PROCEDURE — 87070 CULTURE OTHR SPECIMN AEROBIC: CPT

## 2022-11-29 PROCEDURE — 73718 MRI LOWER EXTREMITY W/O DYE: CPT | Mod: RT

## 2022-11-29 PROCEDURE — 87102 FUNGUS ISOLATION CULTURE: CPT

## 2022-11-29 PROCEDURE — 88311 DECALCIFY TISSUE: CPT

## 2022-11-29 PROCEDURE — 36415 COLL VENOUS BLD VENIPUNCTURE: CPT

## 2022-11-29 PROCEDURE — 93325 DOPPLER ECHO COLOR FLOW MAPG: CPT

## 2022-11-29 PROCEDURE — 93306 TTE W/DOPPLER COMPLETE: CPT

## 2022-11-29 PROCEDURE — 87186 SC STD MICRODIL/AGAR DIL: CPT

## 2022-11-29 PROCEDURE — 71046 X-RAY EXAM CHEST 2 VIEWS: CPT | Mod: 26

## 2022-11-29 PROCEDURE — 87015 SPECIMEN INFECT AGNT CONCNTJ: CPT

## 2022-11-29 PROCEDURE — 87075 CULTR BACTERIA EXCEPT BLOOD: CPT

## 2022-11-29 PROCEDURE — 71045 X-RAY EXAM CHEST 1 VIEW: CPT

## 2022-11-29 PROCEDURE — 93312 ECHO TRANSESOPHAGEAL: CPT

## 2022-11-29 PROCEDURE — 88304 TISSUE EXAM BY PATHOLOGIST: CPT

## 2022-11-29 PROCEDURE — 93923 UPR/LXTR ART STDY 3+ LVLS: CPT

## 2022-11-29 PROCEDURE — 80061 LIPID PANEL: CPT

## 2022-11-29 PROCEDURE — 88305 TISSUE EXAM BY PATHOLOGIST: CPT

## 2022-11-29 PROCEDURE — 87040 BLOOD CULTURE FOR BACTERIA: CPT

## 2022-11-29 PROCEDURE — 85027 COMPLETE CBC AUTOMATED: CPT

## 2022-11-29 PROCEDURE — 83036 HEMOGLOBIN GLYCOSYLATED A1C: CPT

## 2022-11-29 PROCEDURE — 87635 SARS-COV-2 COVID-19 AMP PRB: CPT

## 2022-11-29 PROCEDURE — 93010 ELECTROCARDIOGRAM REPORT: CPT

## 2022-11-29 PROCEDURE — 99223 1ST HOSP IP/OBS HIGH 75: CPT

## 2022-11-29 PROCEDURE — 93320 DOPPLER ECHO COMPLETE: CPT

## 2022-11-29 PROCEDURE — 80048 BASIC METABOLIC PNL TOTAL CA: CPT

## 2022-11-29 PROCEDURE — C1751: CPT

## 2022-11-29 PROCEDURE — 99285 EMERGENCY DEPT VISIT HI MDM: CPT

## 2022-11-29 PROCEDURE — 86803 HEPATITIS C AB TEST: CPT

## 2022-11-29 PROCEDURE — 36569 INSJ PICC 5 YR+ W/O IMAGING: CPT

## 2022-11-29 PROCEDURE — 87206 SMEAR FLUORESCENT/ACID STAI: CPT

## 2022-11-29 PROCEDURE — 87077 CULTURE AEROBIC IDENTIFY: CPT

## 2022-11-29 PROCEDURE — 93926 LOWER EXTREMITY STUDY: CPT | Mod: RT

## 2022-11-29 PROCEDURE — 73630 X-RAY EXAM OF FOOT: CPT | Mod: RT

## 2022-11-29 PROCEDURE — 87116 MYCOBACTERIA CULTURE: CPT

## 2022-11-29 PROCEDURE — 73630 X-RAY EXAM OF FOOT: CPT | Mod: 26,RT

## 2022-11-29 RX ORDER — ALPRAZOLAM 0.25 MG
0.25 TABLET ORAL ONCE
Refills: 0 | Status: DISCONTINUED | OUTPATIENT
Start: 2022-11-29 | End: 2022-11-29

## 2022-11-29 RX ORDER — PIPERACILLIN AND TAZOBACTAM 4; .5 G/20ML; G/20ML
3.38 INJECTION, POWDER, LYOPHILIZED, FOR SOLUTION INTRAVENOUS ONCE
Refills: 0 | Status: COMPLETED | OUTPATIENT
Start: 2022-11-30 | End: 2022-11-30

## 2022-11-29 RX ORDER — ONDANSETRON 8 MG/1
4 TABLET, FILM COATED ORAL EVERY 8 HOURS
Refills: 0 | Status: DISCONTINUED | OUTPATIENT
Start: 2022-11-29 | End: 2022-12-06

## 2022-11-29 RX ORDER — PIPERACILLIN AND TAZOBACTAM 4; .5 G/20ML; G/20ML
3.38 INJECTION, POWDER, LYOPHILIZED, FOR SOLUTION INTRAVENOUS EVERY 8 HOURS
Refills: 0 | Status: DISCONTINUED | OUTPATIENT
Start: 2022-11-30 | End: 2022-12-06

## 2022-11-29 RX ORDER — PIPERACILLIN AND TAZOBACTAM 4; .5 G/20ML; G/20ML
3.38 INJECTION, POWDER, LYOPHILIZED, FOR SOLUTION INTRAVENOUS ONCE
Refills: 0 | Status: COMPLETED | OUTPATIENT
Start: 2022-11-29 | End: 2022-11-29

## 2022-11-29 RX ORDER — VANCOMYCIN HCL 1 G
1250 VIAL (EA) INTRAVENOUS ONCE
Refills: 0 | Status: COMPLETED | OUTPATIENT
Start: 2022-11-29 | End: 2022-11-29

## 2022-11-29 RX ORDER — PIPERACILLIN AND TAZOBACTAM 4; .5 G/20ML; G/20ML
3.38 INJECTION, POWDER, LYOPHILIZED, FOR SOLUTION INTRAVENOUS ONCE
Refills: 0 | Status: DISCONTINUED | OUTPATIENT
Start: 2022-11-29 | End: 2022-11-29

## 2022-11-29 RX ORDER — LANOLIN ALCOHOL/MO/W.PET/CERES
3 CREAM (GRAM) TOPICAL AT BEDTIME
Refills: 0 | Status: DISCONTINUED | OUTPATIENT
Start: 2022-11-29 | End: 2022-12-06

## 2022-11-29 RX ORDER — ASPIRIN/CALCIUM CARB/MAGNESIUM 324 MG
81 TABLET ORAL DAILY
Refills: 0 | Status: DISCONTINUED | OUTPATIENT
Start: 2022-11-29 | End: 2022-12-06

## 2022-11-29 RX ORDER — ATORVASTATIN CALCIUM 80 MG/1
20 TABLET, FILM COATED ORAL AT BEDTIME
Refills: 0 | Status: DISCONTINUED | OUTPATIENT
Start: 2022-11-29 | End: 2022-12-06

## 2022-11-29 RX ORDER — VANCOMYCIN HCL 1 G
1250 VIAL (EA) INTRAVENOUS EVERY 12 HOURS
Refills: 0 | Status: DISCONTINUED | OUTPATIENT
Start: 2022-11-29 | End: 2022-11-30

## 2022-11-29 RX ORDER — ACETAMINOPHEN 500 MG
650 TABLET ORAL EVERY 6 HOURS
Refills: 0 | Status: DISCONTINUED | OUTPATIENT
Start: 2022-11-29 | End: 2022-12-06

## 2022-11-29 RX ORDER — METOPROLOL TARTRATE 50 MG
25 TABLET ORAL DAILY
Refills: 0 | Status: DISCONTINUED | OUTPATIENT
Start: 2022-11-29 | End: 2022-12-06

## 2022-11-29 RX ADMIN — PIPERACILLIN AND TAZOBACTAM 200 GRAM(S): 4; .5 INJECTION, POWDER, LYOPHILIZED, FOR SOLUTION INTRAVENOUS at 18:38

## 2022-11-29 RX ADMIN — Medication 0.25 MILLIGRAM(S): at 22:13

## 2022-11-29 RX ADMIN — Medication 250 MILLIGRAM(S): at 19:57

## 2022-11-29 NOTE — CONSULT NOTE ADULT - ATTENDING COMMENTS
I agree with the assessment and plan as noted by the resident. I have amended the note to reflect my own impression of the patient. R hallux infected ulcer with exposed bone - will plan for I&D with hallux amputation in OR.

## 2022-11-29 NOTE — H&P ADULT - HISTORY OF PRESENT ILLNESS
67 year old male with pmh of htn, hld, present to ED after being sent in by podiatry for surgery on right first toe. per patient was being followed by podiatry however was not able to follow up with them due to thanksgiving and travel. states has ulcer on toe that opened up and was told by podiatry will need amputation. states no fevers or chills. no chest pain or sob.

## 2022-11-29 NOTE — ED STATDOCS - OBJECTIVE STATEMENT
68 y/o male PMHx HTN and HLD presents to ED sent in by podiatry for surgery on right first toe. Pt states he has infection and toe needs to be amputated. Sent in by Dr. Coronado for infection of right toe and possible amputation. Pt reports several weeks of redness in area. X-ray in podiatry office today. Pt sent to ER for admission. No travel, no sick contacts, no illegal drugs, no social concerns.

## 2022-11-29 NOTE — H&P ADULT - NSICDXPASTSURGICALHX_GEN_ALL_CORE_FT
PAST SURGICAL HISTORY:  History of ankle surgery right ankle ORIF 2016    History of colon resection with ileostomy 2000 and  later with ileostomy closure    History of hernia repair     S/P tonsillectomy

## 2022-11-29 NOTE — ED ADULT TRIAGE NOTE - CHIEF COMPLAINT QUOTE
Pt arrives to ED sent in by podiatry for surgery on right first toe. Pt states he has infection and toe needs to be amputated. Hx htn, hld.

## 2022-11-29 NOTE — ED ADULT NURSE NOTE - NSIMPLEMENTINTERV_GEN_ALL_ED
Implemented All Fall Risk Interventions:  [place] to call system. Call bell, personal items and telephone within reach. Instruct patient to call for assistance. Room bathroom lighting operational. Non-slip footwear when patient is off stretcher. Physically safe environment: no spills, clutter or unnecessary equipment. Stretcher in lowest position, wheels locked, appropriate side rails in place. Provide visual cue, wrist band, yellow gown, etc. Monitor gait and stability. Monitor for mental status changes and reorient to person, place, and time. Review medications for side effects contributing to fall risk. Reinforce activity limits and safety measures with patient and family.

## 2022-11-29 NOTE — ED STATDOCS - PROGRESS NOTE DETAILS
Attending Simons, Podiatry resident paged and aware pt to ED and will see pt. Attending alaina Simons/jr Milton for admission

## 2022-11-29 NOTE — ED ADULT NURSE NOTE - OBJECTIVE STATEMENT
patient axox3, sent in by dr. henry c/o infection of big right toe and possible amputation. patient states he has scoliosis and it has effected his walking and he puts a lot of pressure on his big right toe. patient reports several weeks of redness in area. patient denies headache, vision changes, n/v/d, fever, chills, cough, chest pain, SOB. color good, skin warm and dry, respirations even and unlabored. patient able to speak in full sentences. right big toe wrapped by podiatry team in ED, unable to visualize toe.

## 2022-11-29 NOTE — H&P ADULT - NSHPREVIEWOFSYSTEMS_GEN_ALL_CORE
Review of Systems:  CONSTITUTIONAL: No weakness, fevers or chills  EYES/ENT: No visual changes;  No vertigo or throat pain   NECK: No pain or stiffness  RESPIRATORY: No cough, wheezing, hemoptysis; No shortness of breath,   CARDIOVASCULAR: No chest pain or palpitations  GASTROINTESTINAL: No abdominal or epigastric pain. No nausea, vomiting, or hematemesis; No diarrhea or constipation.   GENITOURINARY: No dysuria, frequency or hematuria  NEUROLOGICAL: No numbness or weakness  SKIN: +right toe infection/ulcer

## 2022-11-29 NOTE — CONSULT NOTE ADULT - ASSESSMENT
Assessment 66 y/o male see in the ED for the following   -Gangrene of right hallux  - Diabetic type 2 w/ neuropathy   - Cellulitis Right foot  -Pain in Right Foot   -Difficulty with ambulation      P:   Chart reviewed and Patient evaluated; discussed treatment and plan with Dr. Daniel Hogue DPM  Discussed diagnosis and treatment with patient  Wound flush with copiously amounts of normal saline  Obtained wound culture to be sent to Pathology  Incision and drainage of right hallux to the level fo bone performed in the ED  Applied betadine soaked gauze, dry sterile dressing.   X-rays 3 standard views ordered to right foot  Sx shoe WBAT to right foot  Please medically clear for the OR  Discussed importance of daily foot examinations and proper shoe gear and to importance of lower Fasting Blood Glucose levels.   Patient demonstrated verbal understanding of all interventions and tolerated interventions well without any complications.   Podiatry will follow while in house. Assessment 68 y/o male see in the ED for the following   -Gangrene of right hallux  - Diabetic type 2 w/ neuropathy   - Cellulitis Right foot  -Pain in Right Foot   -Difficulty with ambulation      P:   Chart reviewed and Patient evaluated; discussed treatment and plan with Dr. Daniel Hogue DPM  Discussed diagnosis and treatment with patient  Wound flush with copiously amounts of normal saline  Obtained wound culture to be sent to Pathology  Incision and drainage of right hallux to the level fo bone performed in the ED  Applied betadine soaked gauze, dry sterile dressing.   X-rays 3 standard views ordered to right foot  Sx shoe WBAT to right foot  Please medically clear for the OR possible Tommorow vs thursday  NPO overnight   Continue w/ IV abx  Discussed importance of daily foot examinations and proper shoe gear and to importance of lower Fasting Blood Glucose levels.   Patient demonstrated verbal understanding of all interventions and tolerated interventions well without any complications.   Podiatry will follow while in house. Assessment 68 y/o male see in the ED for the following   -Gangrene of right hallux   - Cellulitis Right foot  -Pain in Right Foot       P:   Chart reviewed and Patient evaluated; discussed treatment and plan with Dr. Daniel Hogue DPM  Discussed diagnosis and treatment with patient  Wound flush with copiously amounts of normal saline  Obtained wound culture to be sent to Pathology  Incision and drainage of right hallux to the level fo bone performed in the ED  Applied betadine soaked gauze, dry sterile dressing.   X-rays 3 standard views ordered to right foot  Sx shoe WBAT to right foot  Please medically clear for the OR possible Tommorow vs thursday  NPO overnight   Continue w/ IV abx  Discussed importance of daily foot examinations and proper shoe gear and to importance of lower Fasting Blood Glucose levels.   Patient demonstrated verbal understanding of all interventions and tolerated interventions well without any complications.   Podiatry will follow while in house.

## 2022-11-29 NOTE — H&P ADULT - NSHPPHYSICALEXAM_GEN_ALL_CORE
Vital Signs Last 24 Hrs  T(F): 98.2 (29 Nov 2022 20:04), Max: 102.2 (29 Nov 2022 17:13)  HR: 91 (29 Nov 2022 20:04) (91 - 112)  BP: 119/59 (29 Nov 2022 20:04) (119/59 - 131/79)  RR: 16 (29 Nov 2022 20:04) (14 - 16)  SpO2: 95% (29 Nov 2022 20:04) (93% - 95%)    Physical Exam:  Constitutional: NAD, awake and alert, well-developed  Neck: Soft and supple, No LAD, No JVD  Respiratory: cta b/l no wheezing no rhonchi  Cardiovascular: +s1/s2 no edema b/l le  Gastrointestinal: soft nt nd bs+  Vascular: 2+ peripheral pulses  Neurological: A/O x 3, no focal deficits  Musculoskeletal: 5/5 strength b/l upper and lower extremities +right foot in bandage  : Contraindicated  Breasts: Contraindicated  Rectal: Contraindicated

## 2022-11-29 NOTE — ED STATDOCS - NSICDXPASTSURGICALHX_GEN_ALL_CORE_FT
PAST SURGICAL HISTORY:  History of ankle surgery right ankle ORIF 2016    History of colon resection with ileostomy 2000 and  later with ileostomy closure    S/P tonsillectomy

## 2022-11-29 NOTE — H&P ADULT - ASSESSMENT
67 year old male with pmh of htn, hld, present to ED after being sent in by podiatry for surgery on right first toe. per patient was being followed by podiatry however was not able to follow up with them due to thanksgiving and travel. states has ulcer on toe that opened up and was told by podiatry will need amputation. states no fevers or chills. no chest pain or sob.     #Right 1st Toe infection/ulcer   - w/ leukocytosis  - admit to medicine   - npo after mn  - podiatry consult val  - vanco and zosyn  - monitor cultures  - r/o osteo- check mri-> had mr outpatient in [facility] in 2020 (after orif 6 years prior in right leg wi/ metal)    ##Preoperative Assessment  Revised Cardiac Risk Assessment   [  ]History of ischemic heart disease   [  ]History of congestive heart failure   [  ]History of cerebrovascular disease (stroke or transient ischemic attack)   [  ]History of diabetes requiring preoperative insulin use   [  ]Chronic kidney disease (creatinine > 2 mg/dL)   [  ]Undergoing suprainguinal vascular, intraperitoneal, or intrathoracic surgery     0 predictors = 0.4%, 1 predictor = 1.0%, 2 predictors = 2.4%, > 3 predictors = 5.4%    * Overall this patient has a  0.4   % risk of cardiac death, nonfatal myocardial infarction, and nonfatal cardiac arrest perioperatively.     Patient does not have any known history of severe valvular disease and is not in decompensated CHF. No recent MI. Baseline functional capacity is > 4 METS. Patient is currently hemodynamically stable. Patient is medically optimized at this time and understands the inherent risks of surgery.     #HLD  - statin    #HTN-Essential   - montior blood pressure  - metoprolol    #Anxiety  - xanax x 1   - istop [id number]    #DVT Prophylaxis  - venodynes

## 2022-11-29 NOTE — ED STATDOCS - MUSCULOSKELETAL, MLM
range of motion is not limited and there is no muscle tenderness. +walking with cane. Right big toe bandaged, redness to right foot +walking with cane. Right big toe bandaged, redness to right foot

## 2022-11-30 ENCOUNTER — TRANSCRIPTION ENCOUNTER (OUTPATIENT)
Age: 67
End: 2022-11-30

## 2022-11-30 ENCOUNTER — RESULT REVIEW (OUTPATIENT)
Age: 67
End: 2022-11-30

## 2022-11-30 LAB
A1C WITH ESTIMATED AVERAGE GLUCOSE RESULT: 5.8 % — HIGH (ref 4–5.6)
ANION GAP SERPL CALC-SCNC: 7 MMOL/L — SIGNIFICANT CHANGE UP (ref 5–17)
BUN SERPL-MCNC: 8 MG/DL — SIGNIFICANT CHANGE UP (ref 7–23)
CALCIUM SERPL-MCNC: 9.2 MG/DL — SIGNIFICANT CHANGE UP (ref 8.5–10.1)
CHLORIDE SERPL-SCNC: 100 MMOL/L — SIGNIFICANT CHANGE UP (ref 96–108)
CHOLEST SERPL-MCNC: 106 MG/DL — SIGNIFICANT CHANGE UP
CO2 SERPL-SCNC: 29 MMOL/L — SIGNIFICANT CHANGE UP (ref 22–31)
CREAT SERPL-MCNC: 0.8 MG/DL — SIGNIFICANT CHANGE UP (ref 0.5–1.3)
CRP SERPL-MCNC: <3 MG/L — SIGNIFICANT CHANGE UP
EGFR: 96 ML/MIN/1.73M2 — SIGNIFICANT CHANGE UP
ESTIMATED AVERAGE GLUCOSE: 120 MG/DL — HIGH (ref 68–114)
GLUCOSE SERPL-MCNC: 103 MG/DL — HIGH (ref 70–99)
HCT VFR BLD CALC: 38.6 % — LOW (ref 39–50)
HCV AB S/CO SERPL IA: 0.12 S/CO — SIGNIFICANT CHANGE UP (ref 0–0.99)
HCV AB SERPL-IMP: SIGNIFICANT CHANGE UP
HDLC SERPL-MCNC: 42 MG/DL — SIGNIFICANT CHANGE UP
HGB BLD-MCNC: 13.1 G/DL — SIGNIFICANT CHANGE UP (ref 13–17)
LIPID PNL WITH DIRECT LDL SERPL: 52 MG/DL — SIGNIFICANT CHANGE UP
MCHC RBC-ENTMCNC: 33.9 GM/DL — SIGNIFICANT CHANGE UP (ref 32–36)
MCHC RBC-ENTMCNC: 34.1 PG — HIGH (ref 27–34)
MCV RBC AUTO: 100.5 FL — HIGH (ref 80–100)
NON HDL CHOLESTEROL: 64 MG/DL — SIGNIFICANT CHANGE UP
PLATELET # BLD AUTO: 320 K/UL — SIGNIFICANT CHANGE UP (ref 150–400)
POTASSIUM SERPL-MCNC: 3.3 MMOL/L — LOW (ref 3.5–5.3)
POTASSIUM SERPL-SCNC: 3.3 MMOL/L — LOW (ref 3.5–5.3)
RBC # BLD: 3.84 M/UL — LOW (ref 4.2–5.8)
RBC # FLD: 12.1 % — SIGNIFICANT CHANGE UP (ref 10.3–14.5)
SODIUM SERPL-SCNC: 136 MMOL/L — SIGNIFICANT CHANGE UP (ref 135–145)
TRIGL SERPL-MCNC: 61 MG/DL — SIGNIFICANT CHANGE UP
WBC # BLD: 10.36 K/UL — SIGNIFICANT CHANGE UP (ref 3.8–10.5)
WBC # FLD AUTO: 10.36 K/UL — SIGNIFICANT CHANGE UP (ref 3.8–10.5)

## 2022-11-30 PROCEDURE — 93923 UPR/LXTR ART STDY 3+ LVLS: CPT | Mod: 26

## 2022-11-30 PROCEDURE — 88304 TISSUE EXAM BY PATHOLOGIST: CPT | Mod: 26

## 2022-11-30 PROCEDURE — 99233 SBSQ HOSP IP/OBS HIGH 50: CPT

## 2022-11-30 PROCEDURE — 73718 MRI LOWER EXTREMITY W/O DYE: CPT | Mod: 26,RT

## 2022-11-30 PROCEDURE — 88305 TISSUE EXAM BY PATHOLOGIST: CPT | Mod: 26

## 2022-11-30 PROCEDURE — 73630 X-RAY EXAM OF FOOT: CPT | Mod: 26,RT

## 2022-11-30 PROCEDURE — 88311 DECALCIFY TISSUE: CPT | Mod: 26

## 2022-11-30 PROCEDURE — 93926 LOWER EXTREMITY STUDY: CPT | Mod: 26,RT

## 2022-11-30 RX ORDER — POTASSIUM CHLORIDE 20 MEQ
40 PACKET (EA) ORAL ONCE
Refills: 0 | Status: COMPLETED | OUTPATIENT
Start: 2022-11-30 | End: 2022-11-30

## 2022-11-30 RX ADMIN — Medication 166.67 MILLIGRAM(S): at 10:12

## 2022-11-30 RX ADMIN — PIPERACILLIN AND TAZOBACTAM 25 GRAM(S): 4; .5 INJECTION, POWDER, LYOPHILIZED, FOR SOLUTION INTRAVENOUS at 22:27

## 2022-11-30 RX ADMIN — ATORVASTATIN CALCIUM 20 MILLIGRAM(S): 80 TABLET, FILM COATED ORAL at 22:26

## 2022-11-30 RX ADMIN — PIPERACILLIN AND TAZOBACTAM 25 GRAM(S): 4; .5 INJECTION, POWDER, LYOPHILIZED, FOR SOLUTION INTRAVENOUS at 11:18

## 2022-11-30 RX ADMIN — Medication 650 MILLIGRAM(S): at 06:53

## 2022-11-30 RX ADMIN — Medication 40 MILLIEQUIVALENT(S): at 11:15

## 2022-11-30 RX ADMIN — Medication 25 MILLIGRAM(S): at 10:11

## 2022-11-30 RX ADMIN — Medication 3 MILLIGRAM(S): at 22:27

## 2022-11-30 RX ADMIN — Medication 650 MILLIGRAM(S): at 05:37

## 2022-11-30 RX ADMIN — PIPERACILLIN AND TAZOBACTAM 25 GRAM(S): 4; .5 INJECTION, POWDER, LYOPHILIZED, FOR SOLUTION INTRAVENOUS at 01:41

## 2022-11-30 RX ADMIN — PIPERACILLIN AND TAZOBACTAM 25 GRAM(S): 4; .5 INJECTION, POWDER, LYOPHILIZED, FOR SOLUTION INTRAVENOUS at 19:16

## 2022-11-30 NOTE — BRIEF OPERATIVE NOTE - DISPOSITION
Bronch with biopsies and lavage under conscious sedation.  Pt on 2lpm 02 via NC.  Pt tolerated procedure.  Post exam pt denies SOB/CP  
X Ray completed. Pulmonary fellow stated X Ray good, patient may dc to home.  
Pacu then floors

## 2022-11-30 NOTE — CHART NOTE - NSCHARTNOTEFT_GEN_A_CORE
POST OP NOTE    VANI RIGGINS  MRN-[id number]    Date: 11/30/22  Surgeon: Dr. Daniel Hogue  Assistants: Anish Ragsdale PGY-2      Patient tolerated both anesthesia and  procedures well and was transported from the operating room to the recovery room with vital signs stable and neurovascular status intact.  Patient transferred to PACU in stable condition.       PE / Post Op Check:       GEN: NAD, AAOx3, resting comfortably with pain controlled      LE Focused: CFT shows adequate perfusion b/l with no signs of ischemic compromise.  No strikethrough is appreciated on surgical dressings.  Dressings were dry, clean, and intact.  No neuromuscular deficits appreciated.        Patient S/P amputation right toe  Percocet 5/325 mg prn  Patient WBAT w/ Sx shoe to R foot  Please elevate right foot w/ 2 pillows no ice.

## 2022-11-30 NOTE — PROGRESS NOTE ADULT - SUBJECTIVE AND OBJECTIVE BOX
CC: Foot infection  History of Present Illness:   67 year old male with pmh of htn, hld, present to ED after being sent in by podiatry for surgery on right first toe. per patient was being followed by podiatry however was not able to follow up with them due to thanksgiving and travel. states has ulcer on toe that opened up and was told by podiatry will need amputation. states no fevers or chills. no chest pain or sob.     s:  : Comfortable. Denies fever, chills, N, V, abd pain, CP, SOB.  Awaiting surgical intervention.    REVIEW OF SYSTEMS: All other review of systems is negative unless indicated above.      Physical Exam:   Vital Signs Last 24 Hrs  T(C): 36.8 (2022 08:50), Max: 39 (2022 17:13)  T(F): 98.2 (2022 08:50), Max: 102.2 (2022 17:13)  HR: 72 (2022 08:50) (72 - 112)  BP: 115/67 (2022 08:50) (114/49 - 131/79)  BP(mean): 75 (2022 20:04) (75 - 93)  RR: 17 (2022 08:50) (14 - 17)  SpO2: 98% (2022 08:50) (93% - 98%)    Parameters below as of 2022 08:50  Patient On (Oxygen Delivery Method): room air        Physical Exam:  Constitutional: NAD, awake and alert, well-developed  Neck: Soft and supple, No LAD, No JVD  Respiratory: cta b/l no wheezing no rhonchi  Cardiovascular: +s1/s2 no edema b/l le  Gastrointestinal: soft nt nd bs+  Vascular: 2+ peripheral pulses  Neurological: A/O x 3, no focal deficits  Musculoskeletal: 5/5 strength b/l upper and lower extremities +right foot in bandage  : Contraindicated  Breasts: Contraindicated  Rectal: Contraindicated      MEDICATIONS  (STANDING):  aspirin  chewable 81 milliGRAM(s) Oral daily  atorvastatin 20 milliGRAM(s) Oral at bedtime  metoprolol succinate ER 25 milliGRAM(s) Oral daily  piperacillin/tazobactam IVPB.. 3.375 Gram(s) IV Intermittent every 8 hours    MEDICATIONS  (PRN):  acetaminophen     Tablet .. 650 milliGRAM(s) Oral every 6 hours PRN Mild Pain (1 - 3)  aluminum hydroxide/magnesium hydroxide/simethicone Suspension 30 milliLiter(s) Oral every 4 hours PRN Dyspepsia  melatonin 3 milliGRAM(s) Oral at bedtime PRN Insomnia  ondansetron Injectable 4 milliGRAM(s) IV Push every 8 hours PRN Nausea and/or Vomiting                                13.1   10.36 )-----------( 320      ( 2022 05:32 )             38.6     11-30    136  |  100  |  8   ----------------------------<  103<H>  3.3<L>   |  29  |  0.80    Ca    9.2      2022 05:32    TPro  8.6<H>  /  Alb  2.5<L>  /  TBili  0.8  /  DBili  x   /  AST  41<H>  /  ALT  34  /  AlkPhos  74  11-    CAPILLARY BLOOD GLUCOSE        LIVER FUNCTIONS - ( 2022 18:20 )  Alb: 2.5 g/dL / Pro: 8.6 gm/dL / ALK PHOS: 74 U/L / ALT: 34 U/L / AST: 41 U/L / GGT: x           PT/INR - ( 2022 18:20 )   PT: 15.8 sec;   INR: 1.36 ratio         PTT - ( 2022 18:20 )  PTT:30.7 sec  Urinalysis Basic - ( 2022 18:20 )    Color: Yellow / Appearance: Clear / S.020 / pH: x  Gluc: x / Ketone: Trace  / Bili: Negative / Urobili: 1   Blood: x / Protein: 30 mg/dL / Nitrite: Negative   Leuk Esterase: Small / RBC: 25-50 /HPF / WBC 3-5   Sq Epi: x / Non Sq Epi: Occasional / Bacteria: Few        Assessment/Plan:  67 year old male with pmh of htn, hld, present to ED after being sent in by podiatry for surgery on right first toe.     #Right 1st Toe infection/ulcer/cellulitis/ osteomyelitis:  - podiatry consult appreciated - for OR for possible amputation  -MRI noted for OM  - vanco and zosyn  -ID following hold further vanco  - monitor cultures      #HLD  - statin    #HTN-Essential   - montior blood pressure  - metoprolol    #Anxiety  - supportive care    #DVT Prophylaxis  - venodynes

## 2022-11-30 NOTE — CONSULT NOTE ADULT - ASSESSMENT
67 year old male with pmh of htn, hld, colon cancer in the past admitted on 11/29 from his podiatry office for evaluation of bogginess of right first toe; the patient has been doing routine wound care but missed appointment due to holiday and travel; has an ulcer that is on first toe and distal first toe is gangrenous and he is expected to go to the OR today. Denies any fever or chills. Had a remote fracture in the right ankle in the past and has plate and screws in this ankle.     1. Patient admitted with right foot cellulitis and gangrene of the right first toe; also noted with leukocytosis most likely reactive to infection  - follow up cultures   - serial cbc and monitor temperature   - reviewed prior medical records to evaluate for resistant or atypical pathogens   - iv hydration and supportive care   - will continue zosyn as ordered   - hold on further vancomycin to avoid potential nephrotoxicity   - to OR later today  - wound care per podiatry  2. other issues; per medicine

## 2022-11-30 NOTE — CONSULT NOTE ADULT - SUBJECTIVE AND OBJECTIVE BOX
Mr. Stout is a 67 year old gentleman who presented to the ED with right great toe gas gangrene, now POD0 from right great toe amputation. Vascular surgery was consulted for assessing blood flow to the right foot. Patient denies cramping, numbness or tingling on his leg except for the right big toe. Feet are warm, PT were palpable bilaterally, DP was palpable on the left side, right side couldn't be assessed due to bulky dressing. DAPHNIE was Rt 1.09 and Lt 1.12. RLE Arterial dup showed moderate calcifications on the vessel wall without significant narrowing.        PHYSICAL EXAM:  - GENERAL: Alert and oriented x 3. No acute distress. Well-nourished.  - EYES: EOMI. Anicteric.  - HENT: Moist mucous membranes. No scleral icterus. No cervical lymphadenopathy.  - LUNGS: Clear to auscultation bilaterally. No accessory muscle use.  - CARDIOVASCULAR: Regular rate and rhythm. No murmur. No JVD.  - ABDOMEN: Soft, non-tender and non-distended. No palpable masses.  - EXTREMITIES: fresh dressing covering the right foot. Denies cramping, numbness or tingling on his leg except for the right big toe. Feet are warm, PT were palpable bilaterally, DP was palpable on the left side, right side couldn't be assessed due to  bulky dressing  - SKIN: No rashes or lesions. Warm.  - NEUROLOGIC: No focal neurological deficits. CN II-XII grossly intact, but not individually tested.  - PSYCHIATRIC: Cooperative. Appropriate mood and affect.                  Vitals:  T(C): 36.4 (12-01 @ 09:18), Max: 37.2 (11-30 @ 17:25)  HR: 71 (12-01 @ 09:18) (71 - 80)  BP: 103/62 (12-01 @ 09:18) (90/41 - 114/67)  RR: 17 (12-01 @ 09:18) (15 - 18)  SpO2: 99% (12-01 @ 09:18) (94% - 99%)      11-30 @ 07:01  -  12-01 @ 07:00  --------------------------------------------------------  IN:    Other (mL): 1000 mL  Total IN: 1000 mL    OUT:    Blood Loss (mL): 20 mL    Voided (mL): 800 mL  Total OUT: 820 mL    Total NET: 180 mL          12-01 @ 06:36                    13.3  CBC: 8.89>)-------(<380                     40.9                 135 | 102 | 9    CMP:  ----------------------< 184               4.3 | 26 | 0.74                      Ca:8.5  Phos:-  Mg:-               -|      |-        LFTs:  ------|-|-----             -|      |-  11-30 @ 05:32                    13.1  CBC: 10.36>)-------(<320                     38.6                 136 | 100 | 8    CMP:  ----------------------< 103               3.3 | 29 | 0.80                      Ca:9.2  Phos:-  Mg:-               -|      |-        LFTs:  ------|-|-----             -|      |-        Culture - Tissue with Gram Stain (collected 11-30-22 @ 16:14)  Source: .Tissue RIGHT HALLUX BONE CX  Gram Stain (12-01-22 @ 04:45):    No polymorphonuclear cells seen per low power field    Rare Gram Negative Rods per oil power field    Culture - Abscess with Gram Stain (collected 11-29-22 @ 18:23)  Source: .Abscess None  Preliminary Report (11-30-22 @ 23:34):    Moderate Morganella morganii    Few Alpha hemolytic strep    Culture - Urine (collected 11-29-22 @ 18:20)  Source: Clean Catch None  Final Report (12-01-22 @ 07:21):    <10,000 CFU/mL Normal Urogenital Jennifer    Culture - Blood (collected 11-29-22 @ 18:20)  Source: .Blood None  Preliminary Report (12-01-22 @ 02:03):    No growth to date.    Culture - Blood (collected 11-29-22 @ 18:20)  Source: .Blood None  Preliminary Report (12-01-22 @ 02:03):    No growth to date.          Current Inpatient Medications:  acetaminophen     Tablet .. 650 milliGRAM(s) Oral every 6 hours PRN  aluminum hydroxide/magnesium hydroxide/simethicone Suspension 30 milliLiter(s) Oral every 4 hours PRN  aspirin  chewable 81 milliGRAM(s) Oral daily  atorvastatin 20 milliGRAM(s) Oral at bedtime  melatonin 3 milliGRAM(s) Oral at bedtime PRN  metoprolol succinate ER 25 milliGRAM(s) Oral daily  ondansetron Injectable 4 milliGRAM(s) IV Push every 8 hours PRN  oxycodone    5 mG/acetaminophen 325 mG 1 Tablet(s) Oral every 6 hours PRN  piperacillin/tazobactam IVPB.. 3.375 Gram(s) IV Intermittent every 8 hours

## 2022-11-30 NOTE — PROGRESS NOTE ADULT - SUBJECTIVE AND OBJECTIVE BOX
Date of Service: 11/30/22  CC/HPI: 68 y/o male PMHx HTN and HLD presents to ED sent in by podiatry for surgery on right first toe. Pt states he has infection and toe needs to be amputated. Sent in by Dr. Coronado for infection of right toe and possible amputation. Pt reports several weeks of redness in area. X-ray in podiatry office today showing gas gangrene. Pt sent to ER for admission. No travel, no sick contacts, no illegal drugs, no social concerns. Patient says that he has been having this wound to the right foot for the past week and it just blew up. Patient says he is well aware of his pedal pathology, and is understanding of the severity of his infection.   11/30/22: Patient seen by podaitry team this am. Patient is pleasant and is scheduled to go to surgery today and is NPO appropriately.     PMH: Hypertension    History of colorectal cancer    Hyperlipidemia      PSH:S/P tonsillectomy    History of ankle surgery    History of colon resection        Allergies:No Known Allergies    Vital Signs Last 24 Hrs  T(C): 36.8 (30 Nov 2022 08:50), Max: 39 (29 Nov 2022 17:13)  T(F): 98.2 (30 Nov 2022 08:50), Max: 102.2 (29 Nov 2022 17:13)  HR: 72 (30 Nov 2022 08:50) (72 - 112)  BP: 115/67 (30 Nov 2022 08:50) (114/49 - 131/79)  BP(mean): 75 (29 Nov 2022 20:04) (75 - 93)  RR: 17 (30 Nov 2022 08:50) (14 - 17)  SpO2: 98% (30 Nov 2022 08:50) (93% - 98%)    Parameters below as of 30 Nov 2022 08:50  Patient On (Oxygen Delivery Method): room air        MEDICATIONS  (STANDING):  aspirin  chewable 81 milliGRAM(s) Oral daily  atorvastatin 20 milliGRAM(s) Oral at bedtime  metoprolol succinate ER 25 milliGRAM(s) Oral daily  piperacillin/tazobactam IVPB.- 3.375 Gram(s) IV Intermittent once  piperacillin/tazobactam IVPB.. 3.375 Gram(s) IV Intermittent every 8 hours  potassium chloride    Tablet ER 40 milliEquivalent(s) Oral once  vancomycin  IVPB 1250 milliGRAM(s) IV Intermittent every 12 hours    MEDICATIONS  (PRN):  acetaminophen     Tablet .. 650 milliGRAM(s) Oral every 6 hours PRN Mild Pain (1 - 3)  aluminum hydroxide/magnesium hydroxide/simethicone Suspension 30 milliLiter(s) Oral every 4 hours PRN Dyspepsia  melatonin 3 milliGRAM(s) Oral at bedtime PRN Insomnia  ondansetron Injectable 4 milliGRAM(s) IV Push every 8 hours PRN Nausea and/or Vomiting             Physical Exam:   Constitutiona: NAD, alert;  Derm:  Skin warm, dry and supple bilateral.  Right distal phalanx gangrene, wound measruing 2x3x2 cm, + malodor, + pus discharge, + probe to bone, + tunneling.   Vascular: Dorsalis Pedis and Posterior Tibial pulses 1/4.  Capillary re-fill time less then 3 seconds digits 1-5 bilateral.   Neuro: Protective sensation Severely Diminished to the level of the digits bilateral.  MSK: Muscle strength 4/5 in all major muscle groups of Right lower extremity. 5/5 in all muscle groups of LLE;  .        Labs:                        13.1   10.36 )-----------( 320      ( 30 Nov 2022 05:32 )             38.6     11-30    136  |  100  |  8   ----------------------------<  103<H>  3.3<L>   |  29  |  0.80    Ca    9.2      30 Nov 2022 05:32    TPro  8.6<H>  /  Alb  2.5<L>  /  TBili  0.8  /  DBili  x   /  AST  41<H>  /  ALT  34  /  AlkPhos  74  11-29      WBC Trend      Chem    Rad/EKG  < from: Xray Foot AP + Lateral + Oblique, Right (11.29.22 @ 18:35) >    Orthopedic hardware in the lower right fibula noted for now healed   fracture with good alignment.    There is erosion of the soft tissues of the distal great toe. There may   be underlying destruction of the distal bone. An MR has been scheduled.    There is severe flexion of the DIP joint of the second toe obscuring   detail.    There may be destruction of this toe as well.    IMPRESSION: Positive right foot findings. MR scheduled. Chest   unremarkable.    --- End of Report ---      < end of copied text >          Date of Service: 11/30/22  CC/HPI: 68 y/o male PMHx HTN and HLD presents to ED sent in by podiatry for surgery on right first toe. Pt states he has infection and toe needs to be amputated. Sent in by Dr. Coronado for infection of right toe and possible amputation. Pt reports several weeks of redness in area. X-ray in podiatry office today showing gas gangrene. Pt sent to ER for admission. No travel, no sick contacts, no illegal drugs, no social concerns. Patient says that he has been having this wound to the right foot for the past week and it just blew up. Patient says he is well aware of his pedal pathology, and is understanding of the severity of his infection.   11/30/22: Patient seen by podaitry team this am. Patient is pleasant and is scheduled to go to surgery today and is NPO appropriately.     PMH: Hypertension    History of colorectal cancer    Hyperlipidemia      PSH:S/P tonsillectomy    History of ankle surgery    History of colon resection        Allergies:No Known Allergies    Vital Signs Last 24 Hrs  T(C): 36.8 (30 Nov 2022 08:50), Max: 39 (29 Nov 2022 17:13)  T(F): 98.2 (30 Nov 2022 08:50), Max: 102.2 (29 Nov 2022 17:13)  HR: 72 (30 Nov 2022 08:50) (72 - 112)  BP: 115/67 (30 Nov 2022 08:50) (114/49 - 131/79)  BP(mean): 75 (29 Nov 2022 20:04) (75 - 93)  RR: 17 (30 Nov 2022 08:50) (14 - 17)  SpO2: 98% (30 Nov 2022 08:50) (93% - 98%)    Parameters below as of 30 Nov 2022 08:50  Patient On (Oxygen Delivery Method): room air        MEDICATIONS  (STANDING):  aspirin  chewable 81 milliGRAM(s) Oral daily  atorvastatin 20 milliGRAM(s) Oral at bedtime  metoprolol succinate ER 25 milliGRAM(s) Oral daily  piperacillin/tazobactam IVPB.- 3.375 Gram(s) IV Intermittent once  piperacillin/tazobactam IVPB.. 3.375 Gram(s) IV Intermittent every 8 hours  potassium chloride    Tablet ER 40 milliEquivalent(s) Oral once  vancomycin  IVPB 1250 milliGRAM(s) IV Intermittent every 12 hours    MEDICATIONS  (PRN):  acetaminophen     Tablet .. 650 milliGRAM(s) Oral every 6 hours PRN Mild Pain (1 - 3)  aluminum hydroxide/magnesium hydroxide/simethicone Suspension 30 milliLiter(s) Oral every 4 hours PRN Dyspepsia  melatonin 3 milliGRAM(s) Oral at bedtime PRN Insomnia  ondansetron Injectable 4 milliGRAM(s) IV Push every 8 hours PRN Nausea and/or Vomiting             Physical Exam:   Constitutiona: NAD, alert;  Derm:  Skin warm, dry and supple bilateral.  Right hallux wound measuring 2x3x2 cm, + malodor, + pus discharge, + probe to bone, + tunneling.   Vascular: Dorsalis Pedis and Posterior Tibial pulses 1/4.  Capillary re-fill time less then 3 seconds digits 1-5 bilateral.   Neuro: Protective sensation Severely Diminished to the level of the digits bilateral.  MSK: Muscle strength 4/5 in all major muscle groups of Right lower extremity. 5/5 in all muscle groups of LLE;  .        Labs:                        13.1   10.36 )-----------( 320      ( 30 Nov 2022 05:32 )             38.6     11-30    136  |  100  |  8   ----------------------------<  103<H>  3.3<L>   |  29  |  0.80    Ca    9.2      30 Nov 2022 05:32    TPro  8.6<H>  /  Alb  2.5<L>  /  TBili  0.8  /  DBili  x   /  AST  41<H>  /  ALT  34  /  AlkPhos  74  11-29      WBC Trend      Chem    Rad/EKG  < from: Xray Foot AP + Lateral + Oblique, Right (11.29.22 @ 18:35) >    Orthopedic hardware in the lower right fibula noted for now healed   fracture with good alignment.    There is erosion of the soft tissues of the distal great toe. There may   be underlying destruction of the distal bone. An MR has been scheduled.    There is severe flexion of the DIP joint of the second toe obscuring   detail.    There may be destruction of this toe as well.    IMPRESSION: Positive right foot findings. MR scheduled. Chest   unremarkable.    --- End of Report ---      < end of copied text >

## 2022-11-30 NOTE — CONSULT NOTE ADULT - SUBJECTIVE AND OBJECTIVE BOX
Patient is a 67y old  Male who presents with a chief complaint of Foot Infection (2022 10:25)    HPI:  67 year old male with pmh of htn, hld, colon cancer in the past admitted on  from his podiatry office for evaluation of bogginess of right first toe; the patient has been doing routine wound care but missed appointment due to holiday and travel; has an ulcer that is on first toe and distal first toe is gangrenous and he is expected to go to the OR today. Denies any fever or chills. Had a remote fracture in the right ankle in the past and has plate and screws in this ankle.       PMH: as above  PSH: as above  Meds: per reconciliation sheet, noted below  MEDICATIONS  (STANDING):  aspirin  chewable 81 milliGRAM(s) Oral daily  atorvastatin 20 milliGRAM(s) Oral at bedtime  metoprolol succinate ER 25 milliGRAM(s) Oral daily  piperacillin/tazobactam IVPB.. 3.375 Gram(s) IV Intermittent every 8 hours  vancomycin  IVPB 1250 milliGRAM(s) IV Intermittent every 12 hours    MEDICATIONS  (PRN):  acetaminophen     Tablet .. 650 milliGRAM(s) Oral every 6 hours PRN Mild Pain (1 - 3)  aluminum hydroxide/magnesium hydroxide/simethicone Suspension 30 milliLiter(s) Oral every 4 hours PRN Dyspepsia  melatonin 3 milliGRAM(s) Oral at bedtime PRN Insomnia  ondansetron Injectable 4 milliGRAM(s) IV Push every 8 hours PRN Nausea and/or Vomiting    Allergies    No Known Allergies    Intolerances      Social: no smoking, no alcohol, no illegal drugs; no recent travel, no exposure to TB  FAMILY HISTORY:  Family history of colon cancer  mother and father       no history of premature cardiovascular disease in first degree relatives  ROS: the patient denies fever, no chills, no HA, no dizziness, no sore throat, no blurry vision, no CP, no palpitations, no SOB, no cough, no abdominal pain, no diarrhea, no N/V, no dysuria, no leg pain, no claudication, no rash, no joint aches, no rectal pain or bleeding, no night sweats  All other systems reviewed and are negative    Vital Signs Last 24 Hrs  T(C): 36.8 (2022 08:50), Max: 39 (2022 17:13)  T(F): 98.2 (2022 08:50), Max: 102.2 (2022 17:13)  HR: 72 (2022 08:50) (72 - 112)  BP: 115/67 (2022 08:50) (114/49 - 131/79)  BP(mean): 75 (2022 20:04) (75 - 93)  RR: 17 (2022 08:50) (14 - 17)  SpO2: 98% (2022 08:50) (93% - 98%)    Parameters below as of 2022 08:50  Patient On (Oxygen Delivery Method): room air      Daily     Daily     PE:    Constitutional: frail looking  HEENT: NC/AT, EOMI, PERRLA, conjunctivae clear; ears and nose atraumatic; pharynx clear  Neck: supple; thyroid not palpable  Back: no tenderness  Respiratory: respiratory effort normal; clear to auscultation  Cardiovascular: S1S2 regular, no murmurs  Abdomen: soft, not tender, not distended, positive BS; no liver or spleen organomegaly  Genitourinary: no suprapubic tenderness  Musculoskeletal: no muscle tenderness, right foot with erythema on dorsum of foot with edema, especially on second toe, first toe with tip with gangrene and bone exposed  Neurological/ Psychiatric: AxOx3, judgement and insight normal;  moving all extremities  Skin: no rashes; no palpable lesions    Labs: all available labs reviewed                        13.1   10.36 )-----------( 320      ( 2022 05:32 )             38.6     11-    136  |  100  |  8   ----------------------------<  103<H>  3.3<L>   |  29  |  0.80    Ca    9.2      2022 05:32    TPro  8.6<H>  /  Alb  2.5<L>  /  TBili  0.8  /  DBili  x   /  AST  41<H>  /  ALT  34  /  AlkPhos  74  11-29     LIVER FUNCTIONS - ( 2022 18:20 )  Alb: 2.5 g/dL / Pro: 8.6 gm/dL / ALK PHOS: 74 U/L / ALT: 34 U/L / AST: 41 U/L / GGT: x           Urinalysis Basic - ( 2022 18:20 )    Color: Yellow / Appearance: Clear / S.020 / pH: x  Gluc: x / Ketone: Trace  / Bili: Negative / Urobili: 1   Blood: x / Protein: 30 mg/dL / Nitrite: Negative   Leuk Esterase: Small / RBC: 25-50 /HPF / WBC 3-5   Sq Epi: x / Non Sq Epi: Occasional / Bacteria: Few    < from: Xray Foot AP + Lateral + Oblique, Right (. @ 18:35) >  Right foot. 3 views.    Orthopedic hardware in the lower right fibula noted for now healed   fracture with good alignment.    There is erosion of the soft tissues of the distal great toe. There may   be underlying destruction of the distal bone. An MR has been scheduled.    There is severe flexion of the DIP joint of the second toe obscuring   detail.    There may be destruction of this toe as well.    IMPRESSION: Positive right foot findings. MR scheduled. Chest   unremarkable.    < end of copied text >        Radiology: all available radiological tests reviewed    Advanced directives addressed: full resuscitation

## 2022-11-30 NOTE — PROGRESS NOTE ADULT - ASSESSMENT
Assessment 68 y/o male see inpatient for the following   -Gangrene of right hallux  - Diabetic type 2 w/ neuropathy   - Cellulitis Right foot  -Pain in Right Foot   -Difficulty with ambulation      Plan:   Chart reviewed and Patient evaluated; discussed treatment and plan with Dr. Daniel Hogue DPM  Discussed diagnosis and treatment with patient  Wound flush with copiously amounts of normal saline  Obtained wound culture to be sent to Pathology  Incision and drainage of right hallux to the level fo bone performed in the ED  Applied betadine soaked gauze, dry sterile dressing.   X-rays 3 standard views ordered to right foot  Sx shoe WBAT to right foot  Patient medically clear for the OR tenatively today   NPO thursday 11/30/22  Continue w/ IV abx  Discussed w/ vascular surgery of blood flow to right foot, appropriate consult placed and DAPHNIE/PVR ; arterial duplex US ordered.   Discussed importance of daily foot examinations and proper shoe gear and to importance of lower Fasting Blood Glucose levels.   Patient demonstrated verbal understanding of all interventions and tolerated interventions well without any complications.   Podiatry will follow while in house. Assessment 66 y/o male see inpatient for the following   -Gangrene of right hallux  - Cellulitis Right foot  -Pain in Right Foot   -Difficulty with ambulation      Plan:   Chart reviewed and Patient evaluated; discussed treatment and plan with Dr. Daniel Hogue DPM  Discussed diagnosis and treatment with patient  Applied betadine soaked gauze, dry sterile dressing.   Sx shoe WBAT to right foot  Patient medically clear for the OR tenatively today   Continue w/ IV abx  Discussed w/ vascular surgery of blood flow to right foot, appropriate consult placed and DAPHNIE/PVR ; arterial duplex US ordered.   Discussed importance of daily foot examinations and proper shoe gear and to importance of lower Fasting Blood Glucose levels.   Patient demonstrated verbal understanding of all interventions and tolerated interventions well without any complications.   Podiatry will follow while in house.

## 2022-11-30 NOTE — CONSULT NOTE ADULT - ASSESSMENT
Mr. Stout is a 67 year old gentleman who presented to the ED with right great toe gas gangrene, now POD0 from right great toe amputation. Vascular surgery was consulted for assessing blood flow to the right foot. Patient denies cramping, numbness or tingling on his leg except for the right big toe. Feet are warm, DP and PT were palpable bilaterally. DAPHNIE was Rt 1.09 and Lt 1.12. RLE Arterial dup showed moderate calcifications on the vessel wall without significant narrowing.      Recommendations:  - DAPHNIE of the RLE wnl, A dup was negative for significant narrowing, bilateral lower extremities has palpable pulses except for the right DP which exam was limited due to surgical dressing. Warm feet as well.  - No indications for further invasive vascular studies at this time.   - Local wound care as per podiatry team  - Rest of the medical care as per primary team      Plan discussed with Dr. Gil Mr. Stout is a 67 year old gentleman who presented to the ED with right great toe gas gangrene, now POD0 from right great toe amputation. Vascular surgery was consulted for assessing blood flow to the right foot. Patient denies cramping, numbness or tingling on his leg except for the right big toe. Feet are warm, PT were palpable bilaterally, DP was palpable on the left side, right side couldn't be assessed due to bulky dressing. DAPHNIE was Rt 1.09 and Lt 1.12. RLE Arterial dup showed moderate calcifications on the vessel wall without significant narrowing.        Recommendations:  - DAPHNIE of the RLE wnl, A dup was negative for significant narrowing, bilateral lower extremities has palpable pulses except for the right DP which exam was limited due to surgical dressing. Warm feet as well.  - No indications for further invasive vascular studies at this time.   - Local wound care as per podiatry team  - Rest of the medical care as per primary team      Plan discussed with Dr. Gil

## 2022-12-01 ENCOUNTER — APPOINTMENT (OUTPATIENT)
Dept: DERMATOLOGY | Facility: CLINIC | Age: 67
End: 2022-12-01

## 2022-12-01 LAB
-  AMIKACIN: SIGNIFICANT CHANGE UP
-  AMOXICILLIN/CLAVULANIC ACID: SIGNIFICANT CHANGE UP
-  AMPICILLIN/SULBACTAM: SIGNIFICANT CHANGE UP
-  AMPICILLIN: SIGNIFICANT CHANGE UP
-  AZTREONAM: SIGNIFICANT CHANGE UP
-  CEFAZOLIN: SIGNIFICANT CHANGE UP
-  CEFEPIME: SIGNIFICANT CHANGE UP
-  CEFOXITIN: SIGNIFICANT CHANGE UP
-  CEFTRIAXONE: SIGNIFICANT CHANGE UP
-  CIPROFLOXACIN: SIGNIFICANT CHANGE UP
-  ERTAPENEM: SIGNIFICANT CHANGE UP
-  GENTAMICIN: SIGNIFICANT CHANGE UP
-  IMIPENEM: SIGNIFICANT CHANGE UP
-  LEVOFLOXACIN: SIGNIFICANT CHANGE UP
-  MEROPENEM: SIGNIFICANT CHANGE UP
-  PIPERACILLIN/TAZOBACTAM: SIGNIFICANT CHANGE UP
-  STREPTOCOCCUS SP. (NOT GRP A, B OR S PNEUMONIAE): SIGNIFICANT CHANGE UP
-  TOBRAMYCIN: SIGNIFICANT CHANGE UP
-  TRIMETHOPRIM/SULFAMETHOXAZOLE: SIGNIFICANT CHANGE UP
ANION GAP SERPL CALC-SCNC: 7 MMOL/L — SIGNIFICANT CHANGE UP (ref 5–17)
BUN SERPL-MCNC: 9 MG/DL — SIGNIFICANT CHANGE UP (ref 7–23)
CALCIUM SERPL-MCNC: 8.5 MG/DL — SIGNIFICANT CHANGE UP (ref 8.5–10.1)
CHLORIDE SERPL-SCNC: 102 MMOL/L — SIGNIFICANT CHANGE UP (ref 96–108)
CO2 SERPL-SCNC: 26 MMOL/L — SIGNIFICANT CHANGE UP (ref 22–31)
CREAT SERPL-MCNC: 0.74 MG/DL — SIGNIFICANT CHANGE UP (ref 0.5–1.3)
CULTURE RESULTS: SIGNIFICANT CHANGE UP
EGFR: 98 ML/MIN/1.73M2 — SIGNIFICANT CHANGE UP
GLUCOSE SERPL-MCNC: 184 MG/DL — HIGH (ref 70–99)
GRAM STN FLD: SIGNIFICANT CHANGE UP
HCT VFR BLD CALC: 40.9 % — SIGNIFICANT CHANGE UP (ref 39–50)
HGB BLD-MCNC: 13.3 G/DL — SIGNIFICANT CHANGE UP (ref 13–17)
MCHC RBC-ENTMCNC: 32.5 GM/DL — SIGNIFICANT CHANGE UP (ref 32–36)
MCHC RBC-ENTMCNC: 33.3 PG — SIGNIFICANT CHANGE UP (ref 27–34)
MCV RBC AUTO: 102.3 FL — HIGH (ref 80–100)
METHOD TYPE: SIGNIFICANT CHANGE UP
METHOD TYPE: SIGNIFICANT CHANGE UP
PLATELET # BLD AUTO: 380 K/UL — SIGNIFICANT CHANGE UP (ref 150–400)
POTASSIUM SERPL-MCNC: 4.3 MMOL/L — SIGNIFICANT CHANGE UP (ref 3.5–5.3)
POTASSIUM SERPL-SCNC: 4.3 MMOL/L — SIGNIFICANT CHANGE UP (ref 3.5–5.3)
RBC # BLD: 4 M/UL — LOW (ref 4.2–5.8)
RBC # FLD: 11.9 % — SIGNIFICANT CHANGE UP (ref 10.3–14.5)
SODIUM SERPL-SCNC: 135 MMOL/L — SIGNIFICANT CHANGE UP (ref 135–145)
SPECIMEN SOURCE: SIGNIFICANT CHANGE UP
WBC # BLD: 8.89 K/UL — SIGNIFICANT CHANGE UP (ref 3.8–10.5)
WBC # FLD AUTO: 8.89 K/UL — SIGNIFICANT CHANGE UP (ref 3.8–10.5)

## 2022-12-01 PROCEDURE — 99232 SBSQ HOSP IP/OBS MODERATE 35: CPT

## 2022-12-01 RX ADMIN — Medication 25 MILLIGRAM(S): at 09:43

## 2022-12-01 RX ADMIN — Medication 81 MILLIGRAM(S): at 09:42

## 2022-12-01 RX ADMIN — PIPERACILLIN AND TAZOBACTAM 25 GRAM(S): 4; .5 INJECTION, POWDER, LYOPHILIZED, FOR SOLUTION INTRAVENOUS at 21:37

## 2022-12-01 RX ADMIN — ATORVASTATIN CALCIUM 20 MILLIGRAM(S): 80 TABLET, FILM COATED ORAL at 21:37

## 2022-12-01 RX ADMIN — PIPERACILLIN AND TAZOBACTAM 25 GRAM(S): 4; .5 INJECTION, POWDER, LYOPHILIZED, FOR SOLUTION INTRAVENOUS at 06:44

## 2022-12-01 RX ADMIN — PIPERACILLIN AND TAZOBACTAM 25 GRAM(S): 4; .5 INJECTION, POWDER, LYOPHILIZED, FOR SOLUTION INTRAVENOUS at 14:24

## 2022-12-01 NOTE — PROGRESS NOTE ADULT - SUBJECTIVE AND OBJECTIVE BOX
Date of Service: 12/01/22  HPI: 66 y/o male PMHx HTN and HLD presents to ED sent in by podiatry for surgery on right first toe. Pt states he has infection and toe needs to be amputated. Sent in by Dr. Coronado for infection of right toe and possible amputation. Pt reports several weeks of redness in area. X-ray in podiatry office today showing gas gangrene. Pt sent to ER for admission. No travel, no sick contacts, no illegal drugs, no social concerns. Patient says that he has been having this wound to the right foot for the past week and it just blew up. Patient says he is well aware of his pedal pathology, and is understanding of the severity of his infection.     12/01/22:     PMH: Hypertension    History of colorectal cancer    Hyperlipidemia      PSH:S/P tonsillectomy    History of ankle surgery    History of colon resection        Allergies:No Known Allergies    Vital Signs Last 24 Hrs  T(C): 36.4 (01 Dec 2022 09:18), Max: 37.2 (30 Nov 2022 17:25)  T(F): 97.6 (01 Dec 2022 09:18), Max: 98.9 (30 Nov 2022 17:25)  HR: 71 (01 Dec 2022 09:18) (71 - 80)  BP: 103/62 (01 Dec 2022 09:18) (90/41 - 114/67)  BP(mean): --  RR: 17 (01 Dec 2022 09:18) (15 - 18)  SpO2: 99% (01 Dec 2022 09:18) (94% - 99%)    Parameters below as of 01 Dec 2022 09:18  Patient On (Oxygen Delivery Method): room air            MEDICATIONS  (STANDING):  aspirin  chewable 81 milliGRAM(s) Oral daily  atorvastatin 20 milliGRAM(s) Oral at bedtime  metoprolol succinate ER 25 milliGRAM(s) Oral daily  piperacillin/tazobactam IVPB.. 3.375 Gram(s) IV Intermittent every 8 hours    MEDICATIONS  (PRN):  acetaminophen     Tablet .. 650 milliGRAM(s) Oral every 6 hours PRN Mild Pain (1 - 3)  aluminum hydroxide/magnesium hydroxide/simethicone Suspension 30 milliLiter(s) Oral every 4 hours PRN Dyspepsia  melatonin 3 milliGRAM(s) Oral at bedtime PRN Insomnia  ondansetron Injectable 4 milliGRAM(s) IV Push every 8 hours PRN Nausea and/or Vomiting  oxycodone    5 mG/acetaminophen 325 mG 1 Tablet(s) Oral every 6 hours PRN Severe Pain (7 - 10)          Physical Exam:   Constitutional NAD, alert;  Lower Extremity Focus:  Derm:  Skin warm, dry and supple bilateral.  Right hallux wound measuring 2x3x2 cm, + malodor, + pus discharge, + probe to bone, + tunneling.   Vascular: Dorsalis Pedis and Posterior Tibial pulses 1/4.  Capillary re-fill time less then 3 seconds digits 1-5 bilateral.   Neuro: Protective sensation Severely Diminished to the level of the digits bilateral.  MSK: Muscle strength 4/5 in all major muscle groups of Right lower extremity. 5/5 in all muscle groups of LLE;  .        Labs:                                   13.3   8.89  )-----------( 380      ( 01 Dec 2022 06:36 )             40.9     12-01    135  |  102  |  9   ----------------------------<  184<H>  4.3   |  26  |  0.74    Ca    8.5      01 Dec 2022 06:36    TPro  8.6<H>  /  Alb  2.5<L>  /  TBili  0.8  /  DBili  x   /  AST  41<H>  /  ALT  34  /  AlkPhos  74  11-29        WBC Trend      Chem    Rad/EKG  < from: Xray Foot AP + Lateral + Oblique, Right (11.29.22 @ 18:35) >    Orthopedic hardware in the lower right fibula noted for now healed   fracture with good alignment.    There is erosion of the soft tissues of the distal great toe. There may   be underlying destruction of the distal bone. An MR has been scheduled.    There is severe flexion of the DIP joint of the second toe obscuring   detail.    There may be destruction of this toe as well.    IMPRESSION: Positive right foot findings. MR scheduled. Chest   unremarkable.    --- End of Report ---      < end of copied text >          Date of Service: 12/01/22  HPI: 68 y/o male PMHx HTN and HLD presents to ED sent in by podiatry for surgery on right first toe. Pt states he has infection and toe needs to be amputated. Sent in by Dr. Coronado for infection of right toe and possible amputation. Pt reports several weeks of redness in area. X-ray in podiatry office today showing gas gangrene. Pt sent to ER for admission. No travel, no sick contacts, no illegal drugs, no social concerns. Patient says that he has been having this wound to the right foot for the past week and it just blew up. Patient says he is well aware of his pedal pathology, and is understanding of the severity of his infection.     12/01/22: Patient seen by podiatry team with attending present. Patient resting comfortably at bedside, NAD and pleasant. Dressings intact to RLE. Pt is POD 1 s/p POD1 amputation of Right hallux.     PMH: Hypertension    History of colorectal cancer    Hyperlipidemia      PSH:S/P tonsillectomy    History of ankle surgery    History of colon resection        Allergies:No Known Allergies    Vital Signs Last 24 Hrs  T(C): 36.4 (01 Dec 2022 09:18), Max: 37.2 (30 Nov 2022 17:25)  T(F): 97.6 (01 Dec 2022 09:18), Max: 98.9 (30 Nov 2022 17:25)  HR: 71 (01 Dec 2022 09:18) (71 - 80)  BP: 103/62 (01 Dec 2022 09:18) (90/41 - 114/67)  BP(mean): --  RR: 17 (01 Dec 2022 09:18) (15 - 18)  SpO2: 99% (01 Dec 2022 09:18) (94% - 99%)    Parameters below as of 01 Dec 2022 09:18  Patient On (Oxygen Delivery Method): room air            MEDICATIONS  (STANDING):  aspirin  chewable 81 milliGRAM(s) Oral daily  atorvastatin 20 milliGRAM(s) Oral at bedtime  metoprolol succinate ER 25 milliGRAM(s) Oral daily  piperacillin/tazobactam IVPB.. 3.375 Gram(s) IV Intermittent every 8 hours    MEDICATIONS  (PRN):  acetaminophen     Tablet .. 650 milliGRAM(s) Oral every 6 hours PRN Mild Pain (1 - 3)  aluminum hydroxide/magnesium hydroxide/simethicone Suspension 30 milliLiter(s) Oral every 4 hours PRN Dyspepsia  melatonin 3 milliGRAM(s) Oral at bedtime PRN Insomnia  ondansetron Injectable 4 milliGRAM(s) IV Push every 8 hours PRN Nausea and/or Vomiting  oxycodone    5 mG/acetaminophen 325 mG 1 Tablet(s) Oral every 6 hours PRN Severe Pain (7 - 10)          Physical Exam:   Constitutional NAD, alert;  Lower Extremity Focus:  Derm:  Skin warm, dry and supple bilateral.  Dressings clean, dry, intact without strikethrough  Vascular: Dorsalis Pedis and Posterior Tibial pulses 1/4.  Capillary re-fill time less then 3 seconds digits 1-5 bilateral.   Neuro: Protective sensation Severely Diminished to the level of the digits bilateral.  MSK: Muscle strength 4/5 in all major muscle groups of Right lower extremity. 5/5 in all muscle groups of LLE;  .        Labs:                                   13.3   8.89  )-----------( 380      ( 01 Dec 2022 06:36 )             40.9     12-01    135  |  102  |  9   ----------------------------<  184<H>  4.3   |  26  |  0.74    Ca    8.5      01 Dec 2022 06:36    TPro  8.6<H>  /  Alb  2.5<L>  /  TBili  0.8  /  DBili  x   /  AST  41<H>  /  ALT  34  /  AlkPhos  74  11-29        WBC Trend      Chem    Rad/EKG  < from: Xray Foot AP + Lateral + Oblique, Right (11.29.22 @ 18:35) >    Orthopedic hardware in the lower right fibula noted for now healed   fracture with good alignment.    There is erosion of the soft tissues of the distal great toe. There may   be underlying destruction of the distal bone. An MR has been scheduled.    There is severe flexion of the DIP joint of the second toe obscuring   detail.    There may be destruction of this toe as well.    IMPRESSION: Positive right foot findings. MR scheduled. Chest   unremarkable.    --- End of Report ---      < end of copied text >          Date of Service: 12/01/22  HPI: 66 y/o male PMHx HTN and HLD presents to ED sent in by podiatry for surgery on right first toe. Pt states he has infection and toe needs to be amputated. Sent in by Dr. Coronado for infection of right toe and possible amputation. Pt reports several weeks of redness in area. X-ray in podiatry office today showing gas gangrene. Pt sent to ER for admission. No travel, no sick contacts, no illegal drugs, no social concerns. Patient says that he has been having this wound to the right foot for the past week and it just blew up. Patient says he is well aware of his pedal pathology, and is understanding of the severity of his infection.     12/01/22: Patient seen by podiatry team with attending present. Patient resting comfortably at bedside, NAD and pleasant. Dressings intact to RLE. Pt is POD 1 s/p POD1 amputation of Right hallux with I&D.    PMH: Hypertension    History of colorectal cancer    Hyperlipidemia      PSH:S/P tonsillectomy    History of ankle surgery    History of colon resection        Allergies:No Known Allergies    Vital Signs Last 24 Hrs  T(C): 36.4 (01 Dec 2022 09:18), Max: 37.2 (30 Nov 2022 17:25)  T(F): 97.6 (01 Dec 2022 09:18), Max: 98.9 (30 Nov 2022 17:25)  HR: 71 (01 Dec 2022 09:18) (71 - 80)  BP: 103/62 (01 Dec 2022 09:18) (90/41 - 114/67)  BP(mean): --  RR: 17 (01 Dec 2022 09:18) (15 - 18)  SpO2: 99% (01 Dec 2022 09:18) (94% - 99%)    Parameters below as of 01 Dec 2022 09:18  Patient On (Oxygen Delivery Method): room air            MEDICATIONS  (STANDING):  aspirin  chewable 81 milliGRAM(s) Oral daily  atorvastatin 20 milliGRAM(s) Oral at bedtime  metoprolol succinate ER 25 milliGRAM(s) Oral daily  piperacillin/tazobactam IVPB.. 3.375 Gram(s) IV Intermittent every 8 hours    MEDICATIONS  (PRN):  acetaminophen     Tablet .. 650 milliGRAM(s) Oral every 6 hours PRN Mild Pain (1 - 3)  aluminum hydroxide/magnesium hydroxide/simethicone Suspension 30 milliLiter(s) Oral every 4 hours PRN Dyspepsia  melatonin 3 milliGRAM(s) Oral at bedtime PRN Insomnia  ondansetron Injectable 4 milliGRAM(s) IV Push every 8 hours PRN Nausea and/or Vomiting  oxycodone    5 mG/acetaminophen 325 mG 1 Tablet(s) Oral every 6 hours PRN Severe Pain (7 - 10)          Physical Exam:   Constitutional NAD, alert;  Lower Extremity Focus:  Derm:  Skin warm, dry and supple bilateral.  Dressings clean, dry, intact without strikethrough  Vascular: Dorsalis Pedis and Posterior Tibial pulses 1/4.  Capillary re-fill time less then 3 seconds digits 1-5 bilateral.   Neuro: Protective sensation Severely Diminished to the level of the digits bilateral.  MSK: Muscle strength 4/5 in all major muscle groups of Right lower extremity. 5/5 in all muscle groups of LLE;  .        Labs:                                   13.3   8.89  )-----------( 380      ( 01 Dec 2022 06:36 )             40.9     12-01    135  |  102  |  9   ----------------------------<  184<H>  4.3   |  26  |  0.74    Ca    8.5      01 Dec 2022 06:36    TPro  8.6<H>  /  Alb  2.5<L>  /  TBili  0.8  /  DBili  x   /  AST  41<H>  /  ALT  34  /  AlkPhos  74  11-29        WBC Trend      Chem    Rad/EKG  < from: Xray Foot AP + Lateral + Oblique, Right (11.29.22 @ 18:35) >    Orthopedic hardware in the lower right fibula noted for now healed   fracture with good alignment.    There is erosion of the soft tissues of the distal great toe. There may   be underlying destruction of the distal bone. An MR has been scheduled.    There is severe flexion of the DIP joint of the second toe obscuring   detail.    There may be destruction of this toe as well.    IMPRESSION: Positive right foot findings. MR scheduled. Chest   unremarkable.    --- End of Report ---      < end of copied text >

## 2022-12-01 NOTE — PHYSICAL THERAPY INITIAL EVALUATION ADULT - GENERAL OBSERVATIONS, REHAB EVAL
Pt rec'd EOB. Pt wife was at bedside. Pt A&Ox4, exited to get moving. Pt had brought straight cane from home and civilian donned and more clothes on floor at end of bed.  Pt wearing surgi-shoe with dressing clean and dry before amb. Pt has sneakers from home as well Pt rec'd EOB. Pt wife was at bedside. Pt A&Ox4, exited to get moving. Pt has brought straight cane from home. Civilian clothes donned and more on floor at end of bed.  Pt wearing surgi-shoe with dressing clean and dry before amb. Pt has sneakers from home as well

## 2022-12-01 NOTE — PROGRESS NOTE ADULT - ATTENDING COMMENTS
I agree with the assessment and plan. I agree with the assessment and plan. Pt is s/p R hallux amputation with I&D. Bandage left in place today. Will change tomorrow and assess viability of amputation site.

## 2022-12-01 NOTE — PROGRESS NOTE ADULT - SUBJECTIVE AND OBJECTIVE BOX
Date of service: 12-01-22 @ 10:52    Patient lying in bed; s/p podiatric surgery on 11/30; pain well controlled; is comfortable, afebrile        ROS: no fever or chills; denies dizziness, no HA, no SOB or cough, no abdominal pain, no diarrhea or constipation; no dysuria, no urinary frequency, no legs pain, no rashes    MEDICATIONS  (STANDING):  aspirin  chewable 81 milliGRAM(s) Oral daily  atorvastatin 20 milliGRAM(s) Oral at bedtime  metoprolol succinate ER 25 milliGRAM(s) Oral daily  piperacillin/tazobactam IVPB.. 3.375 Gram(s) IV Intermittent every 8 hours    MEDICATIONS  (PRN):  acetaminophen     Tablet .. 650 milliGRAM(s) Oral every 6 hours PRN Mild Pain (1 - 3)  aluminum hydroxide/magnesium hydroxide/simethicone Suspension 30 milliLiter(s) Oral every 4 hours PRN Dyspepsia  melatonin 3 milliGRAM(s) Oral at bedtime PRN Insomnia  ondansetron Injectable 4 milliGRAM(s) IV Push every 8 hours PRN Nausea and/or Vomiting  oxycodone    5 mG/acetaminophen 325 mG 1 Tablet(s) Oral every 6 hours PRN Severe Pain (7 - 10)      Vital Signs Last 24 Hrs  T(C): 36.4 (01 Dec 2022 09:18), Max: 37.2 (30 Nov 2022 17:25)  T(F): 97.6 (01 Dec 2022 09:18), Max: 98.9 (30 Nov 2022 17:25)  HR: 71 (01 Dec 2022 09:18) (71 - 80)  BP: 103/62 (01 Dec 2022 09:18) (90/41 - 114/67)  BP(mean): --  RR: 17 (01 Dec 2022 09:18) (15 - 18)  SpO2: 99% (01 Dec 2022 09:18) (94% - 99%)    Parameters below as of 01 Dec 2022 09:18  Patient On (Oxygen Delivery Method): room air            Physical Exam:        PE:    Constitutional: frail looking  HEENT: NC/AT, EOMI, PERRLA, conjunctivae clear; ears and nose atraumatic; pharynx clear  Neck: supple; thyroid not palpable  Back: no tenderness  Respiratory: respiratory effort normal; clear to auscultation  Cardiovascular: S1S2 regular, no murmurs  Abdomen: soft, not tender, not distended, positive BS; no liver or spleen organomegaly  Genitourinary: no suprapubic tenderness  Musculoskeletal: no muscle tenderness, right foot with surgical dressing intact  Neurological/ Psychiatric: AxOx3, judgement and insight normal;  moving all extremities  Skin: no rashes; no palpable lesions    Labs: all available labs reviewed                 Labs:                        13.3   8.89  )-----------( 380      ( 01 Dec 2022 06:36 )             40.9     12-01    135  |  102  |  9   ----------------------------<  184<H>  4.3   |  26  |  0.74    Ca    8.5      01 Dec 2022 06:36    TPro  8.6<H>  /  Alb  2.5<L>  /  TBili  0.8  /  DBili  x   /  AST  41<H>  /  ALT  34  /  AlkPhos  74  11-29           Cultures:       Culture - Tissue with Gram Stain (collected 11-30-22 @ 16:14)  Source: .Tissue RIGHT HALLUX BONE CX  Gram Stain (12-01-22 @ 04:45):    No polymorphonuclear cells seen per low power field    Rare Gram Negative Rods per oil power field    Culture - Abscess with Gram Stain (collected 11-29-22 @ 18:23)  Source: .Abscess None  Preliminary Report (11-30-22 @ 23:34):    Moderate Morganella morganii    Few Alpha hemolytic strep    Culture - Urine (collected 11-29-22 @ 18:20)  Source: Clean Catch None  Final Report (12-01-22 @ 07:21):    <10,000 CFU/mL Normal Urogenital Jennifer    Culture - Blood (collected 11-29-22 @ 18:20)  Source: .Blood None  Preliminary Report (12-01-22 @ 02:03):    No growth to date.    Culture - Blood (collected 11-29-22 @ 18:20)  Source: .Blood None  Preliminary Report (12-01-22 @ 02:03):    No growth to date.      C-Reactive Protein, Serum: <3 mg/L (11-29-22 @ 18:20)                < from: Xray Foot AP + Lateral + Oblique, Right (11.29.22 @ 18:35) >  Right foot. 3 views.    Orthopedic hardware in the lower right fibula noted for now healed   fracture with good alignment.    There is erosion of the soft tissues of the distal great toe. There may   be underlying destruction of the distal bone. An MR has been scheduled.    There is severe flexion of the DIP joint of the second toe obscuring   detail.    There may be destruction of this toe as well.    IMPRESSION: Positive right foot findings. MR scheduled. Chest   unremarkable.    < end of copied text >        Radiology: all available radiological tests reviewed    Advanced directives addressed: full resuscitation

## 2022-12-01 NOTE — PROGRESS NOTE ADULT - SUBJECTIVE AND OBJECTIVE BOX
CC: Foot infection  History of Present Illness:   67 year old male with pmh of htn, hld, present to ED after being sent in by podiatry for surgery on right first toe. per patient was being followed by podiatry however was not able to follow up with them due to thanksgiving and travel. states has ulcer on toe that opened up and was told by podiatry will need amputation. states no fevers or chills. no chest pain or sob.     s:  : Comfortable. Denies fever, chills, N, V, abd pain, CP, SOB.  Awaiting surgical intervention.  : POD 1, toe amputation.  Denies fever, chills, N, V, abd pain, CP, SOB.    REVIEW OF SYSTEMS: All other review of systems is negative unless indicated above.      Physical Exam:   Vital Signs Last 24 Hrs  T(C): 36.4 (01 Dec 2022 09:18), Max: 37.2 (2022 17:25)  T(F): 97.6 (01 Dec 2022 09:18), Max: 98.9 (2022 17:25)  HR: 71 (01 Dec 2022 09:18) (71 - 80)  BP: 103/62 (01 Dec 2022 09:18) (90/41 - 114/67)  BP(mean): --  RR: 17 (01 Dec 2022 09:18) (15 - 18)  SpO2: 99% (01 Dec 2022 09:18) (94% - 99%)    Parameters below as of 01 Dec 2022 09:18  Patient On (Oxygen Delivery Method): room air      PHYSICAL EXAM:    Constitutional: NAD, awake and alert, well-developed  HEENT: PERR, EOMI, Normal Hearing, MMM  Neck: Soft and supple  Respiratory: Breath sounds are clear bilaterally, No wheezing, rales or rhonchi  Cardiovascular: S1 and S2, regular rate and rhythm, no Murmurs, gallops or rubs  Gastrointestinal: Bowel Sounds present, soft, nontender, nondistended, no guarding, no rebound  Extremities: No peripheral edema. right foot in dressing  Neurological: A/O x 3, no focal deficits in my limited exam        MEDICATIONS  (STANDING):  aspirin  chewable 81 milliGRAM(s) Oral daily  atorvastatin 20 milliGRAM(s) Oral at bedtime  metoprolol succinate ER 25 milliGRAM(s) Oral daily  piperacillin/tazobactam IVPB.. 3.375 Gram(s) IV Intermittent every 8 hours    MEDICATIONS  (PRN):  acetaminophen     Tablet .. 650 milliGRAM(s) Oral every 6 hours PRN Mild Pain (1 - 3)  aluminum hydroxide/magnesium hydroxide/simethicone Suspension 30 milliLiter(s) Oral every 4 hours PRN Dyspepsia  melatonin 3 milliGRAM(s) Oral at bedtime PRN Insomnia  ondansetron Injectable 4 milliGRAM(s) IV Push every 8 hours PRN Nausea and/or Vomiting  oxycodone    5 mG/acetaminophen 325 mG 1 Tablet(s) Oral every 6 hours PRN Severe Pain (7 - 10)                              13.3   8.89  )-----------( 380      ( 01 Dec 2022 06:36 )             40.9     12    135  |  102  |  9   ----------------------------<  184<H>  4.3   |  26  |  0.74    Ca    8.5      01 Dec 2022 06:36    TPro  8.6<H>  /  Alb  2.5<L>  /  TBili  0.8  /  DBili  x   /  AST  41<H>  /  ALT  34  /  AlkPhos  74      CAPILLARY BLOOD GLUCOSE        LIVER FUNCTIONS - ( 2022 18:20 )  Alb: 2.5 g/dL / Pro: 8.6 gm/dL / ALK PHOS: 74 U/L / ALT: 34 U/L / AST: 41 U/L / GGT: x           PT/INR - ( 2022 18:20 )   PT: 15.8 sec;   INR: 1.36 ratio         PTT - ( 2022 18:20 )  PTT:30.7 sec  Urinalysis Basic - ( 2022 18:20 )    Color: Yellow / Appearance: Clear / S.020 / pH: x  Gluc: x / Ketone: Trace  / Bili: Negative / Urobili: 1   Blood: x / Protein: 30 mg/dL / Nitrite: Negative   Leuk Esterase: Small / RBC: 25-50 /HPF / WBC 3-5   Sq Epi: x / Non Sq Epi: Occasional / Bacteria: Few        Culture Results:   Testing in progress (22 @ 16:14)            Assessment/Plan:  67 year old male with pmh of htn, hld, present to ED after being sent in by podiatry for surgery on right first toe.     #Right 1st Toe infection/ulcer/cellulitis/ osteomyelitis:  - podiatry consult appreciated - for OR for possible amputation  -MRI noted for OM  - c/w zosyn  - vanco stopped per ID  - BCx no growth  - abscess cx morganella, alpha strep  - f/u bone cultures  -dressing changes per podiatry      #HLD  - statin    #HTN-Essential   - montior blood pressure  - metoprolol    #Anxiety  - supportive care    #DVT Prophylaxis  - venodynes    Dispo:  -pending ID and podiatry plan (IV vs oral abx)

## 2022-12-01 NOTE — PROGRESS NOTE ADULT - ASSESSMENT
Assessment 66 y/o male see inpatient for the following   -Gangrene of right hallux>> now s/p POD1 amputation of Right hallux  -Cellulitis Right foot  -Pain in Right Foot   -Difficulty with ambulation      Plan:   Chart reviewed and Patient evaluated; discussed treatment and plan with Dr. Daniel Hogue DPM  Discussed diagnosis and treatment with patient  Applied betadine soaked gauze, dry sterile dressing.   Sx shoe WBAT to right foot  Continue w/ IV abx  Discussed w/ vascular surgery of blood flow to right foot, appropriate consult placed and DAPHNIE/PVR ; arterial duplex US ordered.   Discussed importance of daily foot examinations and proper shoe gear and to importance of lower Fasting Blood Glucose levels.   Patient demonstrated verbal understanding of all interventions and tolerated interventions well without any complications.   Podiatry will follow while in house. Assessment 68 y/o male see inpatient for the following   -Gangrene of right hallux>> now s/p POD1 amputation of Right hallux  -Cellulitis Right foot  -Pain in Right Foot   -Difficulty with ambulation      Plan:   Chart reviewed and Patient evaluated;   Discussed diagnosis and treatment with patient  Applied betadine soaked gauze, dry sterile dressing.   Sx shoe WBAT to right foot  Continue w/ IV abx per ID  Vascular consult appreciated  Dressings clean/dry/intact. Will determine iv vs po antibiotics tomorrow after evaluation and discuss further with ID  Discussed importance of daily foot examinations and proper shoe gear and to importance of lower Fasting Blood Glucose levels.   Patient demonstrated verbal understanding of all interventions and tolerated interventions well without any complications.   Podiatry will follow while in house.  Case D/W Dr. Daniel Gallagher

## 2022-12-01 NOTE — PROGRESS NOTE ADULT - ASSESSMENT
67 year old male with pmh of htn, hld, colon cancer in the past admitted on 11/29 from his podiatry office for evaluation of bogginess of right first toe; the patient has been doing routine wound care but missed appointment due to holiday and travel; has an ulcer that is on first toe and distal first toe is gangrenous and he is expected to go to the OR today. Denies any fever or chills. Had a remote fracture in the right ankle in the past and has plate and screws in this ankle.     1. Patient admitted with right foot cellulitis and gangrene of the right first toe; also noted with leukocytosis most likely reactive to infection  - follow up cultures --- Morganella on preop culture and gram negative rods in OR culture, sensitivity and identification pending  - serial cbc and monitor temperature   - reviewed prior medical records to evaluate for resistant or atypical pathogens   - iv hydration and supportive care   - will continue zosyn as ordered   - hold on further vancomycin to avoid potential nephrotoxicity   - tolerating antibiotics without rashes or side effects   - will discuss further with podiatry if patient would benefit from picc line and home iv antibiotics versus po antibiotics  - wound care per podiatry  2. other issues; per medicine

## 2022-12-02 LAB
CULTURE RESULTS: SIGNIFICANT CHANGE UP
GRAM STN FLD: SIGNIFICANT CHANGE UP
ORGANISM # SPEC MICROSCOPIC CNT: SIGNIFICANT CHANGE UP
ORGANISM # SPEC MICROSCOPIC CNT: SIGNIFICANT CHANGE UP
SPECIMEN SOURCE: SIGNIFICANT CHANGE UP

## 2022-12-02 PROCEDURE — 93306 TTE W/DOPPLER COMPLETE: CPT | Mod: 26

## 2022-12-02 PROCEDURE — 99233 SBSQ HOSP IP/OBS HIGH 50: CPT

## 2022-12-02 RX ADMIN — PIPERACILLIN AND TAZOBACTAM 25 GRAM(S): 4; .5 INJECTION, POWDER, LYOPHILIZED, FOR SOLUTION INTRAVENOUS at 21:46

## 2022-12-02 RX ADMIN — PIPERACILLIN AND TAZOBACTAM 25 GRAM(S): 4; .5 INJECTION, POWDER, LYOPHILIZED, FOR SOLUTION INTRAVENOUS at 15:03

## 2022-12-02 RX ADMIN — Medication 81 MILLIGRAM(S): at 09:53

## 2022-12-02 RX ADMIN — PIPERACILLIN AND TAZOBACTAM 25 GRAM(S): 4; .5 INJECTION, POWDER, LYOPHILIZED, FOR SOLUTION INTRAVENOUS at 06:07

## 2022-12-02 RX ADMIN — ATORVASTATIN CALCIUM 20 MILLIGRAM(S): 80 TABLET, FILM COATED ORAL at 21:46

## 2022-12-02 RX ADMIN — Medication 25 MILLIGRAM(S): at 09:54

## 2022-12-02 NOTE — PROGRESS NOTE ADULT - SUBJECTIVE AND OBJECTIVE BOX
Date of service: 12-02-22 @ 10:55      Patient sitting in bed; has no complaints, has been afebrile  Found to have positive blood culture with Alpha hemolytic Strep; multiple organisms in his wound prior to surgery and during OR cultures      ROS: no fever or chills; denies dizziness, no HA, no SOB or cough, no abdominal pain, no diarrhea or constipation; no dysuria, no urinary frequency, no legs pain, no rashes    MEDICATIONS  (STANDING):  aspirin  chewable 81 milliGRAM(s) Oral daily  atorvastatin 20 milliGRAM(s) Oral at bedtime  metoprolol succinate ER 25 milliGRAM(s) Oral daily  piperacillin/tazobactam IVPB.. 3.375 Gram(s) IV Intermittent every 8 hours    MEDICATIONS  (PRN):  acetaminophen     Tablet .. 650 milliGRAM(s) Oral every 6 hours PRN Mild Pain (1 - 3)  aluminum hydroxide/magnesium hydroxide/simethicone Suspension 30 milliLiter(s) Oral every 4 hours PRN Dyspepsia  melatonin 3 milliGRAM(s) Oral at bedtime PRN Insomnia  ondansetron Injectable 4 milliGRAM(s) IV Push every 8 hours PRN Nausea and/or Vomiting  oxycodone    5 mG/acetaminophen 325 mG 1 Tablet(s) Oral every 6 hours PRN Severe Pain (7 - 10)      Vital Signs Last 24 Hrs  T(C): 36.4 (02 Dec 2022 08:39), Max: 36.4 (01 Dec 2022 15:19)  T(F): 97.5 (02 Dec 2022 08:39), Max: 97.5 (01 Dec 2022 15:19)  HR: 60 (02 Dec 2022 08:39) (60 - 68)  BP: 111/67 (02 Dec 2022 08:39) (111/67 - 136/76)  BP(mean): 114 (02 Dec 2022 05:04) (114 - 114)  RR: 18 (02 Dec 2022 08:39) (17 - 18)  SpO2: 99% (02 Dec 2022 08:39) (97% - 99%)    Parameters below as of 02 Dec 2022 08:39  Patient On (Oxygen Delivery Method): room air            Physical Exam:          Constitutional: frail looking  HEENT: NC/AT, EOMI, PERRLA, conjunctivae clear; ears and nose atraumatic; pharynx clear  Neck: supple; thyroid not palpable  Back: no tenderness  Respiratory: respiratory effort normal; clear to auscultation  Cardiovascular: S1S2 regular, no murmurs  Abdomen: soft, not tender, not distended, positive BS; no liver or spleen organomegaly  Genitourinary: no suprapubic tenderness  Musculoskeletal: no muscle tenderness, right foot with surgical dressing intact  Neurological/ Psychiatric: AxOx3, judgement and insight normal;  moving all extremities  Skin: no rashes; no palpable lesions    Labs: all available labs reviewed                         Labs:                        13.3   8.89  )-----------( 380      ( 01 Dec 2022 06:36 )             40.9     12-01    135  |  102  |  9   ----------------------------<  184<H>  4.3   |  26  |  0.74    Ca    8.5      01 Dec 2022 06:36             Cultures:       Culture - Acid Fast - Tissue w/Smear (collected 11-30-22 @ 16:14)  Source: .Tissue RIGHT HALLUX BONE CX    Culture - Fungal, Tissue (collected 11-30-22 @ 16:14)  Source: .Tissue RIGHT HALLUX BONE CX  Preliminary Report (12-01-22 @ 11:00):    Testing in progress    Culture - Tissue with Gram Stain (collected 11-30-22 @ 16:14)  Source: .Tissue RIGHT HALLUX BONE CX  Gram Stain (12-01-22 @ 04:45):    No polymorphonuclear cells seen per low power field    Rare Gram Negative Rods per oil power field  Preliminary Report (12-02-22 @ 10:14):    Few Morganella morganii    Rare Staphylococcus aureus    Few Alpha hemolytic strep "Susceptibilities not performed"    Culture - Abscess with Gram Stain (collected 11-29-22 @ 18:23)  Source: .Abscess None  Preliminary Report (12-01-22 @ 19:12):    Moderate Morganella morganii    Few Alpha hemolytic strep "Susceptibilities not performed"    Few Staphylococcus aureus    Moderate Prevotella melaninogenica "Susceptibilities not performed"  Organism: Morganella morganii (12-01-22 @ 16:32)  Organism: Morganella morganii (12-01-22 @ 16:32)      -  Amikacin: S <=16      -  Amoxicillin/Clavulanic Acid: R >16/8      -  Ampicillin: R >16 These ampicillin results predict results for amoxicillin      -  Ampicillin/Sulbactam: S 8/4 Enterobacter, Klebsiella aerogenes, Citrobacter, and Serratia may develop resistance during prolonged therapy (3-4 days)      -  Aztreonam: S <=4      -  Cefazolin: R >16 Enterobacter, Klebsiella aerogenes, Citrobacter, and Serratia may develop resistance during prolonged therapy (3-4 days)      -  Cefepime: S <=2      -  Cefoxitin: S <=8      -  Ceftriaxone: S <=1 Enterobacter, Klebsiella aerogenes, Citrobacter, and Serratia may develop resistance during prolonged therapy      -  Ciprofloxacin: S <=0.25      -  Ertapenem: S <=0.5      -  Gentamicin: S <=2      -  Imipenem: S <=1      -  Levofloxacin: S <=0.5      -  Meropenem: S <=1      -  Piperacillin/Tazobactam: S <=8      -  Tobramycin: S <=2      -  Trimethoprim/Sulfamethoxazole: S <=0.5/9.5      Method Type: DIANE    Culture - Urine (collected 11-29-22 @ 18:20)  Source: Clean Catch None  Final Report (12-01-22 @ 07:21):    <10,000 CFU/mL Normal Urogenital Jennifer    Culture - Blood (collected 11-29-22 @ 18:20)  Source: .Blood None  Preliminary Report (12-01-22 @ 02:03):    No growth to date.    Culture - Blood (collected 11-29-22 @ 18:20)  Source: .Blood None  Gram Stain (12-01-22 @ 11:51):    Growth in anaerobic bottle: Gram positive cocci in pairs  Final Report (12-02-22 @ 10:57):    Growth in anaerobic bottle: Streptococcus constellatus Alpha hemolytic    strep    (not Strep. pneumoniae or Enterococcus)    Single set isolate, possible contaminant. Contact    Microbiology if susceptibility testing clinically    indicated.    ***Blood PanelPCR results on this specimen are available    approximately 3 hours after the Gram stain result.***    Gram stain, PCR, and/or culture results may not always    correspond due to difference in methodologies.    ************************************************************    This PCR assay was performed by multiplex PCR. This    Assay tests for 66 bacterial and resistance gene targets.    Please refer to the [facility] test directory    at [email or web address] for details.  Organism: Blood Culture PCR (12-02-22 @ 10:57)  Organism: Blood Culture PCR (12-02-22 @ 10:57)      -  Streptococcus sp. (Not Grp A, B or S pneumoniae): Detec      Method Type: PCR      C-Reactive Protein, Serum: <3 mg/L (11-29-22 @ 18:20)          < from: Xray Foot AP + Lateral + Oblique, Right (11.29.22 @ 18:35) >  Right foot. 3 views.    Orthopedic hardware in the lower right fibula noted for now healed   fracture with good alignment.    There is erosion of the soft tissues of the distal great toe. There may   be underlying destruction of the distal bone. An MR has been scheduled.    There is severe flexion of the DIP joint of the second toe obscuring   detail.    There may be destruction of this toe as well.    IMPRESSION: Positive right foot findings. MR scheduled. Chest   unremarkable.    < end of copied text >        Radiology: all available radiological tests reviewed    Advanced directives addressed: full resuscitation

## 2022-12-02 NOTE — PROGRESS NOTE ADULT - ASSESSMENT
67 year old male with pmh of htn, hld, colon cancer in the past admitted on 11/29 from his podiatry office for evaluation of bogginess of right first toe; the patient has been doing routine wound care but missed appointment due to holiday and travel; has an ulcer that is on first toe and distal first toe is gangrenous and he is expected to go to the OR today. Denies any fever or chills. Had a remote fracture in the right ankle in the past and has plate and screws in this ankle.     1. Patient admitted with right foot cellulitis and gangrene of the right first toe; also noted with leukocytosis most likely reactive to infection  - follow up cultures --- Morganella on preop culture and gram negative rods in OR culture, sensitivity and identification pending; in addition has been found to have anaerobes, Staph aureus in OR cultures; blood culture positive with Strep constellatus  - will check echocardiogram to rule out subacute bacterial endocarditis given the severity of his foot infection  - serial cbc and monitor temperature   - reviewed prior medical records to evaluate for resistant or atypical pathogens   - iv hydration and supportive care   - will continue zosyn as ordered which covers all the pathogens identified  - tolerating antibiotics without rashes or side effects   - will need home iv antibiotics which most likely will be arranged early next week  - repeat blood cultures in am to ensure clearance of the bacteria from the blood  - wound care per podiatry  2. other issues; per medicine

## 2022-12-02 NOTE — PROGRESS NOTE ADULT - SUBJECTIVE AND OBJECTIVE BOX
CC: Foot infection  History of Present Illness:   67 year old male with pmh of htn, hld, present to ED after being sent in by podiatry for surgery on right first toe. per patient was being followed by podiatry however was not able to follow up with them due to thanksgiving and travel. states has ulcer on toe that opened up and was told by podiatry will need amputation. states no fevers or chills. no chest pain or sob.     s:  11/30: Comfortable. Denies fever, chills, N, V, abd pain, CP, SOB.  Awaiting surgical intervention.  12/1: POD 1, toe amputation.  Denies fever, chills, N, V, abd pain, CP, SOB.  12/2: POD # 2, Denies fever, chills, N, V, abd pain, CP, SOB.  Tolerating antibiotics.      REVIEW OF SYSTEMS: All other review of systems is negative unless indicated above.      Physical Exam:   Vital Signs Last 24 Hrs  T(C): 36.4 (02 Dec 2022 08:39), Max: 36.4 (01 Dec 2022 15:19)  T(F): 97.5 (02 Dec 2022 08:39), Max: 97.5 (01 Dec 2022 15:19)  HR: 60 (02 Dec 2022 08:39) (60 - 68)  BP: 111/67 (02 Dec 2022 08:39) (111/67 - 136/76)  BP(mean): 114 (02 Dec 2022 05:04) (114 - 114)  RR: 18 (02 Dec 2022 08:39) (17 - 18)  SpO2: 99% (02 Dec 2022 08:39) (97% - 99%)    Parameters below as of 02 Dec 2022 08:39  Patient On (Oxygen Delivery Method): room air          PHYSICAL EXAM:    Constitutional: NAD, awake and alert, well-developed  HEENT: PERR, EOMI, Normal Hearing, MMM  Neck: Soft and supple  Respiratory: Breath sounds are clear bilaterally, No wheezing, rales or rhonchi  Cardiovascular: S1 and S2, regular rate and rhythm, no Murmurs, gallops or rubs  Gastrointestinal: Bowel Sounds present, soft, nontender, nondistended, no guarding, no rebound  Extremities: No peripheral edema. right foot in dressing  Neurological: A/O x 3, no focal deficits in my limited exam        MEDICATIONS  (STANDING):  aspirin  chewable 81 milliGRAM(s) Oral daily  atorvastatin 20 milliGRAM(s) Oral at bedtime  metoprolol succinate ER 25 milliGRAM(s) Oral daily  piperacillin/tazobactam IVPB.. 3.375 Gram(s) IV Intermittent every 8 hours    MEDICATIONS  (PRN):  acetaminophen     Tablet .. 650 milliGRAM(s) Oral every 6 hours PRN Mild Pain (1 - 3)  aluminum hydroxide/magnesium hydroxide/simethicone Suspension 30 milliLiter(s) Oral every 4 hours PRN Dyspepsia  melatonin 3 milliGRAM(s) Oral at bedtime PRN Insomnia  ondansetron Injectable 4 milliGRAM(s) IV Push every 8 hours PRN Nausea and/or Vomiting  oxycodone    5 mG/acetaminophen 325 mG 1 Tablet(s) Oral every 6 hours PRN Severe Pain (7 - 10)                                13.3   8.89  )-----------( 380      ( 01 Dec 2022 06:36 )             40.9     12-01    135  |  102  |  9   ----------------------------<  184<H>  4.3   |  26  |  0.74    Ca    8.5      01 Dec 2022 06:36      CAPILLARY BLOOD GLUCOSE            Assessment/Plan:  67 year old male with pmh of htn, hld, present to ED after being sent in by podiatry for surgery on right first toe.     #Right 1st Toe infection / ulcer / cellulitis / osteomyelitis with strep bacteremia:  - MRI noted for OM  - podiatry consult appreciated - POD # 2 toe amputation  - wound care per podiatry   - c/w zosyn  - vanco stopped per ID  - abscess cx morganella, alpha strep  - OR Cx staph   - blood cultures streptococcus  - f/u echo  - f/u repeat cultures  -dressing changes per podiatry      #HLD  - statin    #HTN-Essential   - metoprolol    #Anxiety  - supportive care    #DVT Prophylaxis  - venodynes    Dispo:  -continue antibiotics.  d/c planning early next week.

## 2022-12-03 LAB
-  AMPICILLIN/SULBACTAM: SIGNIFICANT CHANGE UP
-  CEFAZOLIN: SIGNIFICANT CHANGE UP
-  CLINDAMYCIN: SIGNIFICANT CHANGE UP
-  ERYTHROMYCIN: SIGNIFICANT CHANGE UP
-  GENTAMICIN: SIGNIFICANT CHANGE UP
-  OXACILLIN: SIGNIFICANT CHANGE UP
-  PENICILLIN: SIGNIFICANT CHANGE UP
-  RIFAMPIN: SIGNIFICANT CHANGE UP
-  TETRACYCLINE: SIGNIFICANT CHANGE UP
-  TRIMETHOPRIM/SULFAMETHOXAZOLE: SIGNIFICANT CHANGE UP
-  VANCOMYCIN: SIGNIFICANT CHANGE UP
CULTURE RESULTS: SIGNIFICANT CHANGE UP
CULTURE RESULTS: SIGNIFICANT CHANGE UP
METHOD TYPE: SIGNIFICANT CHANGE UP
ORGANISM # SPEC MICROSCOPIC CNT: SIGNIFICANT CHANGE UP
SPECIMEN SOURCE: SIGNIFICANT CHANGE UP

## 2022-12-03 PROCEDURE — 99233 SBSQ HOSP IP/OBS HIGH 50: CPT

## 2022-12-03 RX ORDER — OXYCODONE HYDROCHLORIDE 5 MG/1
10 TABLET ORAL EVERY 4 HOURS
Refills: 0 | Status: DISCONTINUED | OUTPATIENT
Start: 2022-12-03 | End: 2022-12-06

## 2022-12-03 RX ORDER — OXYCODONE HYDROCHLORIDE 5 MG/1
10 TABLET ORAL EVERY 4 HOURS
Refills: 0 | Status: DISCONTINUED | OUTPATIENT
Start: 2022-12-03 | End: 2022-12-03

## 2022-12-03 RX ORDER — OXYCODONE HYDROCHLORIDE 5 MG/1
5 TABLET ORAL EVERY 4 HOURS
Refills: 0 | Status: DISCONTINUED | OUTPATIENT
Start: 2022-12-03 | End: 2022-12-06

## 2022-12-03 RX ADMIN — OXYCODONE HYDROCHLORIDE 10 MILLIGRAM(S): 5 TABLET ORAL at 21:26

## 2022-12-03 RX ADMIN — Medication 81 MILLIGRAM(S): at 09:54

## 2022-12-03 RX ADMIN — ATORVASTATIN CALCIUM 20 MILLIGRAM(S): 80 TABLET, FILM COATED ORAL at 21:26

## 2022-12-03 RX ADMIN — PIPERACILLIN AND TAZOBACTAM 25 GRAM(S): 4; .5 INJECTION, POWDER, LYOPHILIZED, FOR SOLUTION INTRAVENOUS at 13:08

## 2022-12-03 RX ADMIN — Medication 650 MILLIGRAM(S): at 21:26

## 2022-12-03 RX ADMIN — Medication 650 MILLIGRAM(S): at 21:56

## 2022-12-03 RX ADMIN — PIPERACILLIN AND TAZOBACTAM 25 GRAM(S): 4; .5 INJECTION, POWDER, LYOPHILIZED, FOR SOLUTION INTRAVENOUS at 05:44

## 2022-12-03 RX ADMIN — Medication 25 MILLIGRAM(S): at 09:54

## 2022-12-03 RX ADMIN — PIPERACILLIN AND TAZOBACTAM 25 GRAM(S): 4; .5 INJECTION, POWDER, LYOPHILIZED, FOR SOLUTION INTRAVENOUS at 21:27

## 2022-12-03 RX ADMIN — OXYCODONE HYDROCHLORIDE 10 MILLIGRAM(S): 5 TABLET ORAL at 21:56

## 2022-12-03 NOTE — PROGRESS NOTE ADULT - SUBJECTIVE AND OBJECTIVE BOX
CC: Foot infection  History of Present Illness:   67 year old male with pmh of htn, hld, present to ED after being sent in by podiatry for surgery on right first toe. per patient was being followed by podiatry however was not able to follow up with them due to thanksgiving and travel. states has ulcer on toe that opened up and was told by podiatry will need amputation. states no fevers or chills. no chest pain or sob.     s:  11/30: Comfortable. Denies fever, chills, N, V, abd pain, CP, SOB.  Awaiting surgical intervention.  12/1: POD 1, toe amputation.  Denies fever, chills, N, V, abd pain, CP, SOB.  12/2: POD # 2, Denies fever, chills, N, V, abd pain, CP, SOB.  Tolerating antibiotics.  12/3: seen at bedside, awake, alert, family in [facility], presently comfotable no issues      REVIEW OF SYSTEMS: All other review of systems is negative unless indicated above.    MEDICATIONS  (STANDING):  aspirin  chewable 81 milliGRAM(s) Oral daily  atorvastatin 20 milliGRAM(s) Oral at bedtime  metoprolol succinate ER 25 milliGRAM(s) Oral daily  piperacillin/tazobactam IVPB.. 3.375 Gram(s) IV Intermittent every 8 hours    MEDICATIONS  (PRN):  acetaminophen     Tablet .. 650 milliGRAM(s) Oral every 6 hours PRN Mild Pain (1 - 3)  aluminum hydroxide/magnesium hydroxide/simethicone Suspension 30 milliLiter(s) Oral every 4 hours PRN Dyspepsia  melatonin 3 milliGRAM(s) Oral at bedtime PRN Insomnia  ondansetron Injectable 4 milliGRAM(s) IV Push every 8 hours PRN Nausea and/or Vomiting  oxyCODONE    IR 5 milliGRAM(s) Oral every 4 hours PRN Moderate Pain (4 - 6)  oxyCODONE    IR 10 milliGRAM(s) Oral every 4 hours PRN Severe Pain (7 - 10)      PHYSICAL EXAM:    GENERAL: Comfortable, no acute distress   HEAD:  Normocephalic, atraumatic  EYES: EOMI, PERRLA  HEENT: Moist mucous membranes  NECK: Supple, No JVD  NERVOUS SYSTEM:  Alert & Oriented X3, Motor Strength 5/5 B/L upper and lower extremities  CHEST/LUNG: Clear to auscultation bilaterally  HEART: Regular rate and rhythm  ABDOMEN: Soft, non tender, Nondistended, Bowel sounds present  GENITOURINARY: Voiding, no palpable bladder  EXTREMITIES:   No clubbing, cyanosis, or edema  MUSCULOSKELETAL- right foot dsg c/d/i  SKIN-no rash        labs:   no new labs today            < from: TTE Echo Complete w/o Contrast w/ Doppler (12.02.22 @ 13:59) >   Summary     The left ventricle is normal in size, wall motion and contractility.   Estimated left ventricular ejection fraction is 60%.   Normal appearing right ventricle structure and function.   There is focal calcification of the anterior mitral valve leaflet. The   leaflet opening is normal. Very small,linear echodensity seen on the   anterior mitral leaflet. Trace mitral regurgitation.   Aortic valve sclerosis. Very small, linear echodensity is seen on   non-coronary cusp.   * Small echodensities described on the aortic and mitral valves could   represent vegetations; consider DONNIE for further evaluation if clinically   indicated.              Assessment/Plan:  67 year old male with pmh of htn, hld, present to ED after being sent in by podiatry for surgery on right first toe.     #Right 1st Toe infection / ulcer / cellulitis / osteomyelitis with strep bacteremia:  - MRI noted for OM  - podiatry consult appreciated - POD # 3 toe amputation  - wound care per podiatry   - c/w zosyn  - vanco stopped per ID  - abscess cx morganella, alpha strep  - OR Cx staph   - blood cultures streptococcus  - f/u echo  - f/u repeat cultures  -dressing changes per podiatry      # r/o endocarditis    echo noted for sm hypodensities on  mitral and aortic valves which could represent vegetations    DONNIE recommneded for further eval    cardial consult placed with on call cardio Dr Valerio     #HLD  - statin    #HTN-Essential   - metoprolol    #Anxiety  - supportive care    #DVT Prophylaxis  - venodynes    Dispo:  -continue antibiotics.  d/c planning early next week.     CC: Foot infection  History of Present Illness:   67 year old male with pmh of htn, hld, present to ED after being sent in by podiatry for surgery on right first toe. per patient was being followed by podiatry however was not able to follow up with them due to thanksgiving and travel. states has ulcer on toe that opened up and was told by podiatry will need amputation. states no fevers or chills. no chest pain or sob.     s:  11/30: Comfortable. Denies fever, chills, N, V, abd pain, CP, SOB.  Awaiting surgical intervention.  12/1: POD 1, toe amputation.  Denies fever, chills, N, V, abd pain, CP, SOB.  12/2: POD # 2, Denies fever, chills, N, V, abd pain, CP, SOB.  Tolerating antibiotics.  12/3: seen at bedside, awake, alert, family in [facility], presently comfotable no issues      REVIEW OF SYSTEMS: All other review of systems is negative unless indicated above.    MEDICATIONS  (STANDING):  aspirin  chewable 81 milliGRAM(s) Oral daily  atorvastatin 20 milliGRAM(s) Oral at bedtime  metoprolol succinate ER 25 milliGRAM(s) Oral daily  piperacillin/tazobactam IVPB.. 3.375 Gram(s) IV Intermittent every 8 hours    MEDICATIONS  (PRN):  acetaminophen     Tablet .. 650 milliGRAM(s) Oral every 6 hours PRN Mild Pain (1 - 3)  aluminum hydroxide/magnesium hydroxide/simethicone Suspension 30 milliLiter(s) Oral every 4 hours PRN Dyspepsia  melatonin 3 milliGRAM(s) Oral at bedtime PRN Insomnia  ondansetron Injectable 4 milliGRAM(s) IV Push every 8 hours PRN Nausea and/or Vomiting  oxyCODONE    IR 5 milliGRAM(s) Oral every 4 hours PRN Moderate Pain (4 - 6)  oxyCODONE    IR 10 milliGRAM(s) Oral every 4 hours PRN Severe Pain (7 - 10)      PHYSICAL EXAM:    GENERAL: Comfortable, no acute distress   HEAD:  Normocephalic, atraumatic  EYES: EOMI, PERRLA  HEENT: Moist mucous membranes  NECK: Supple, No JVD  NERVOUS SYSTEM:  Alert & Oriented X3, Motor Strength 5/5 B/L upper and lower extremities  CHEST/LUNG: Clear to auscultation bilaterally  HEART: Regular rate and rhythm  ABDOMEN: Soft, non tender, Nondistended, Bowel sounds present  GENITOURINARY: Voiding, no palpable bladder  EXTREMITIES:   No clubbing, cyanosis, or edema  MUSCULOSKELETAL- right foot dsg c/d/i  SKIN-no rash        labs:   no new labs today            < from: TTE Echo Complete w/o Contrast w/ Doppler (12.02.22 @ 13:59) >   Summary     The left ventricle is normal in size, wall motion and contractility.   Estimated left ventricular ejection fraction is 60%.   Normal appearing right ventricle structure and function.   There is focal calcification of the anterior mitral valve leaflet. The   leaflet opening is normal. Very small,linear echodensity seen on the   anterior mitral leaflet. Trace mitral regurgitation.   Aortic valve sclerosis. Very small, linear echodensity is seen on   non-coronary cusp.   * Small echodensities described on the aortic and mitral valves could   represent vegetations; consider DONNIE for further evaluation if clinically   indicated.              Assessment/Plan:  67 year old male with pmh of htn, hld, present to ED after being sent in by podiatry for surgery on right first toe.     #Right 1st Toe infection / ulcer / cellulitis / osteomyelitis with strep bacteremia:  - MRI noted for OM  - podiatry consult appreciated - POD # 3 toe amputation  - wound care per podiatry   - c/w zosyn  - vanco stopped per ID  - abscess cx morganella, alpha strep  - OR Cx staph   - blood cultures streptococcus  - f/u echo  - f/u repeat cultures  -dressing changes per podiatry      # r/o endocarditis    echo noted for sm hypodensities on  mitral and aortic valves which could represent vegetations    DONNIE recommneded for further eval    cardial consult placed with on call cardio Dr Valerio     #HLD  - statin    #HTN-Essential   - metoprolol    #Anxiety  - supportive care    #DVT Prophylaxis  - venodynes    Dispo:  -continue antibiotics.  d/c planning early next week. most likely will need PICC line and IV abxs set up for home

## 2022-12-03 NOTE — PROGRESS NOTE ADULT - ATTENDING COMMENTS
I agree with the assessment and plan. Pt is s/p R hallux amputation with I&D. Bandage left in place today. Will change tomorrow and assess viability of amputation site.

## 2022-12-03 NOTE — PROGRESS NOTE ADULT - ASSESSMENT
Assessment 68 y/o male see inpatient for the following   -Gangrene of right hallux>> now s/p POD3 amputation of Right hallux  -Cellulitis Right foot  -Pain in Right Foot   -Difficulty with ambulation      Plan:   Chart reviewed and Patient evaluated;   Discussed diagnosis and treatment with patient  Applied betadine soaked gauze, dry sterile dressing.   Sx shoe WBAT to right foot  Continue w/ IV abx per ID  Vascular consult appreciated  Pt s/p POD3 amputation of Right hallux, surgical site stable  Dressings clean/dry/intact.  ID recommendations appreciated    Discussed importance of daily foot examinations and proper shoe gear  Patient demonstrated verbal understanding of all interventions and tolerated interventions well without any complications.   Podiatry will follow while in house.  Case D/W Dr. Daniel Gallagher

## 2022-12-03 NOTE — PROGRESS NOTE ADULT - SUBJECTIVE AND OBJECTIVE BOX
Date of Service: 12/03/22  HPI: 66 y/o male PMHx HTN and HLD presents to ED sent in by podiatry for surgery on right first toe. Pt states he has infection and toe needs to be amputated. Sent in by Dr. Coronado for infection of right toe and possible amputation. Pt reports several weeks of redness in area. X-ray in podiatry office today showing gas gangrene. Pt sent to ER for admission. No travel, no sick contacts, no illegal drugs, no social concerns. Patient says that he has been having this wound to the right foot for the past week and it just blew up. Patient says he is well aware of his pedal pathology, and is understanding of the severity of his infection.     12/03/22: Patient seen by podiatry team with attending present. Patient resting comfortably at bedside, NAD and pleasant. Dressings intact to RLE. Pt is s/p POD3 amputation of Right hallux with I&D.    PMH: Hypertension    History of colorectal cancer    Hyperlipidemia      PSH:S/P tonsillectomy    History of ankle surgery    History of colon resection        Allergies:No Known Allergies    Vital Signs Last 24 Hrs  T(C): 36.7 (03 Dec 2022 15:25), Max: 36.7 (03 Dec 2022 15:25)  T(F): 98 (03 Dec 2022 15:25), Max: 98 (03 Dec 2022 15:25)  HR: 68 (03 Dec 2022 15:25) (68 - 83)  BP: 125/78 (03 Dec 2022 15:25) (114/67 - 128/68)  BP(mean): --  RR: 18 (03 Dec 2022 15:25) (18 - 18)  SpO2: 99% (03 Dec 2022 15:25) (97% - 99%)    Parameters below as of 03 Dec 2022 15:25  Patient On (Oxygen Delivery Method): room air      MEDICATIONS  (STANDING):  aspirin  chewable 81 milliGRAM(s) Oral daily  atorvastatin 20 milliGRAM(s) Oral at bedtime  metoprolol succinate ER 25 milliGRAM(s) Oral daily  piperacillin/tazobactam IVPB.. 3.375 Gram(s) IV Intermittent every 8 hours    MEDICATIONS  (PRN):  acetaminophen     Tablet .. 650 milliGRAM(s) Oral every 6 hours PRN Mild Pain (1 - 3)  aluminum hydroxide/magnesium hydroxide/simethicone Suspension 30 milliLiter(s) Oral every 4 hours PRN Dyspepsia  melatonin 3 milliGRAM(s) Oral at bedtime PRN Insomnia  ondansetron Injectable 4 milliGRAM(s) IV Push every 8 hours PRN Nausea and/or Vomiting  oxyCODONE    IR 5 milliGRAM(s) Oral every 4 hours PRN Moderate Pain (4 - 6)  oxyCODONE    IR 10 milliGRAM(s) Oral every 4 hours PRN Severe Pain (7 - 10)            Physical Exam:   Constitutional NAD, alert;  Lower Extremity Focus:  Derm:  Skin warm, dry and supple bilateral.  Dressings clean, dry, intact without strikethrough. Incision site stable with intact sutures without indication of dehiscence. Scant sanguinous drainage noted.   Vascular: Dorsalis Pedis and Posterior Tibial pulses 1/4.  Capillary re-fill time less then 3 seconds digits 1-5 bilateral.   Neuro: Protective sensation Severely Diminished to the level of the digits bilateral.  MSK: Muscle strength 4/5 in all major muscle groups of Right lower extremity. 5/5 in all muscle groups of LLE;  .        Labs:                                   13.3   8.89  )-----------( 380      ( 01 Dec 2022 06:36 )             40.9     12-01    135  |  102  |  9   ----------------------------<  184<H>  4.3   |  26  |  0.74    Ca    8.5      01 Dec 2022 06:36    TPro  8.6<H>  /  Alb  2.5<L>  /  TBili  0.8  /  DBili  x   /  AST  41<H>  /  ALT  34  /  AlkPhos  74  11-29      Chem    Rad/EKG  < from: Xray Foot AP + Lateral + Oblique, Right (11.29.22 @ 18:35) >    Orthopedic hardware in the lower right fibula noted for now healed   fracture with good alignment.    There is erosion of the soft tissues of the distal great toe. There may   be underlying destruction of the distal bone. An MR has been scheduled.    There is severe flexion of the DIP joint of the second toe obscuring   detail.    There may be destruction of this toe as well.    IMPRESSION: Positive right foot findings. MR scheduled. Chest   unremarkable.      --- End of Report ---      < end of copied text >

## 2022-12-04 DIAGNOSIS — R93.1 ABNORMAL FINDINGS ON DIAGNOSTIC IMAGING OF HEART AND CORONARY CIRCULATION: ICD-10-CM

## 2022-12-04 DIAGNOSIS — I10 ESSENTIAL (PRIMARY) HYPERTENSION: ICD-10-CM

## 2022-12-04 DIAGNOSIS — R78.81 BACTEREMIA: ICD-10-CM

## 2022-12-04 DIAGNOSIS — I45.10 UNSPECIFIED RIGHT BUNDLE-BRANCH BLOCK: ICD-10-CM

## 2022-12-04 LAB
ANION GAP SERPL CALC-SCNC: 4 MMOL/L — LOW (ref 5–17)
BUN SERPL-MCNC: 10 MG/DL — SIGNIFICANT CHANGE UP (ref 7–23)
CALCIUM SERPL-MCNC: 8.4 MG/DL — LOW (ref 8.5–10.1)
CHLORIDE SERPL-SCNC: 109 MMOL/L — HIGH (ref 96–108)
CO2 SERPL-SCNC: 27 MMOL/L — SIGNIFICANT CHANGE UP (ref 22–31)
CREAT SERPL-MCNC: 0.69 MG/DL — SIGNIFICANT CHANGE UP (ref 0.5–1.3)
EGFR: 100 ML/MIN/1.73M2 — SIGNIFICANT CHANGE UP
GLUCOSE SERPL-MCNC: 99 MG/DL — SIGNIFICANT CHANGE UP (ref 70–99)
HCT VFR BLD CALC: 39.7 % — SIGNIFICANT CHANGE UP (ref 39–50)
HGB BLD-MCNC: 13.2 G/DL — SIGNIFICANT CHANGE UP (ref 13–17)
MCHC RBC-ENTMCNC: 33.2 GM/DL — SIGNIFICANT CHANGE UP (ref 32–36)
MCHC RBC-ENTMCNC: 33.7 PG — SIGNIFICANT CHANGE UP (ref 27–34)
MCV RBC AUTO: 101.3 FL — HIGH (ref 80–100)
PLATELET # BLD AUTO: 376 K/UL — SIGNIFICANT CHANGE UP (ref 150–400)
POTASSIUM SERPL-MCNC: 4 MMOL/L — SIGNIFICANT CHANGE UP (ref 3.5–5.3)
POTASSIUM SERPL-SCNC: 4 MMOL/L — SIGNIFICANT CHANGE UP (ref 3.5–5.3)
RBC # BLD: 3.92 M/UL — LOW (ref 4.2–5.8)
RBC # FLD: 12.2 % — SIGNIFICANT CHANGE UP (ref 10.3–14.5)
SODIUM SERPL-SCNC: 140 MMOL/L — SIGNIFICANT CHANGE UP (ref 135–145)
WBC # BLD: 7.66 K/UL — SIGNIFICANT CHANGE UP (ref 3.8–10.5)
WBC # FLD AUTO: 7.66 K/UL — SIGNIFICANT CHANGE UP (ref 3.8–10.5)

## 2022-12-04 PROCEDURE — 99223 1ST HOSP IP/OBS HIGH 75: CPT

## 2022-12-04 PROCEDURE — 99232 SBSQ HOSP IP/OBS MODERATE 35: CPT

## 2022-12-04 RX ADMIN — OXYCODONE HYDROCHLORIDE 10 MILLIGRAM(S): 5 TABLET ORAL at 14:59

## 2022-12-04 RX ADMIN — PIPERACILLIN AND TAZOBACTAM 25 GRAM(S): 4; .5 INJECTION, POWDER, LYOPHILIZED, FOR SOLUTION INTRAVENOUS at 15:01

## 2022-12-04 RX ADMIN — Medication 81 MILLIGRAM(S): at 09:45

## 2022-12-04 RX ADMIN — OXYCODONE HYDROCHLORIDE 10 MILLIGRAM(S): 5 TABLET ORAL at 15:29

## 2022-12-04 RX ADMIN — Medication 650 MILLIGRAM(S): at 21:06

## 2022-12-04 RX ADMIN — Medication 25 MILLIGRAM(S): at 09:45

## 2022-12-04 RX ADMIN — ATORVASTATIN CALCIUM 20 MILLIGRAM(S): 80 TABLET, FILM COATED ORAL at 21:05

## 2022-12-04 RX ADMIN — PIPERACILLIN AND TAZOBACTAM 25 GRAM(S): 4; .5 INJECTION, POWDER, LYOPHILIZED, FOR SOLUTION INTRAVENOUS at 21:07

## 2022-12-04 RX ADMIN — PIPERACILLIN AND TAZOBACTAM 25 GRAM(S): 4; .5 INJECTION, POWDER, LYOPHILIZED, FOR SOLUTION INTRAVENOUS at 06:30

## 2022-12-04 RX ADMIN — Medication 650 MILLIGRAM(S): at 21:40

## 2022-12-04 RX ADMIN — OXYCODONE HYDROCHLORIDE 10 MILLIGRAM(S): 5 TABLET ORAL at 21:06

## 2022-12-04 RX ADMIN — OXYCODONE HYDROCHLORIDE 10 MILLIGRAM(S): 5 TABLET ORAL at 22:43

## 2022-12-04 NOTE — PROGRESS NOTE ADULT - SUBJECTIVE AND OBJECTIVE BOX
CC: Foot infection  History of Present Illness:   67 year old male with pmh of htn, hld, present to ED after being sent in by podiatry for surgery on right first toe. per patient was being followed by podiatry however was not able to follow up with them due to thanksgiving and travel. states has ulcer on toe that opened up and was told by podiatry will need amputation. states no fevers or chills. no chest pain or sob.     s  11/30: Comfortable. Denies fever, chills, N, V, abd pain, CP, SOB.  Awaiting surgical intervention.  12/1: POD 1, toe amputation.  Denies fever, chills, N, V, abd pain, CP, SOB.  12/2: POD # 2, Denies fever, chills, N, V, abd pain, CP, SOB.  Tolerating antibiotics.  12/3: seen at bedside, awake, alert, family in [facility], presently comfotable no issues  12/4: seen at bedside       REVIEW OF SYSTEMS: All other review of systems is negative unless indicated above.    MEDICATIONS  (STANDING):  aspirin  chewable 81 milliGRAM(s) Oral daily  atorvastatin 20 milliGRAM(s) Oral at bedtime  metoprolol succinate ER 25 milliGRAM(s) Oral daily  piperacillin/tazobactam IVPB.. 3.375 Gram(s) IV Intermittent every 8 hours    MEDICATIONS  (PRN):  acetaminophen     Tablet .. 650 milliGRAM(s) Oral every 6 hours PRN Mild Pain (1 - 3)  aluminum hydroxide/magnesium hydroxide/simethicone Suspension 30 milliLiter(s) Oral every 4 hours PRN Dyspepsia  melatonin 3 milliGRAM(s) Oral at bedtime PRN Insomnia  ondansetron Injectable 4 milliGRAM(s) IV Push every 8 hours PRN Nausea and/or Vomiting  oxyCODONE    IR 5 milliGRAM(s) Oral every 4 hours PRN Moderate Pain (4 - 6)  oxyCODONE    IR 10 milliGRAM(s) Oral every 4 hours PRN Severe Pain (7 - 10)      PHYSICAL EXAM:    GENERAL: Comfortable, no acute distress   HEAD:  Normocephalic, atraumatic  EYES: EOMI, PERRLA  HEENT: Moist mucous membranes  NECK: Supple, No JVD  NERVOUS SYSTEM:  Alert & Oriented X3, Motor Strength 5/5 B/L upper and lower extremities  CHEST/LUNG: Clear to auscultation bilaterally  HEART: Regular rate and rhythm  ABDOMEN: Soft, non tender, Nondistended, Bowel sounds present  GENITOURINARY: Voiding, no palpable bladder  EXTREMITIES:   No clubbing, cyanosis, or edema  MUSCULOSKELETAL- right foot dsg c/d/i  SKIN-no rash        labs:                          13.2   7.66  )-----------( 376      ( 04 Dec 2022 05:10 )             39.7       12-04    140  |  109<H>  |  10  ----------------------------<  99  4.0   |  27  |  0.69    Ca    8.4<L>      04 Dec 2022 05:10                    < from: TTE Echo Complete w/o Contrast w/ Doppler (12.02.22 @ 13:59) >   Summary     The left ventricle is normal in size, wall motion and contractility.   Estimated left ventricular ejection fraction is 60%.   Normal appearing right ventricle structure and function.   There is focal calcification of the anterior mitral valve leaflet. The   leaflet opening is normal. Very small,linear echodensity seen on the   anterior mitral leaflet. Trace mitral regurgitation.   Aortic valve sclerosis. Very small, linear echodensity is seen on   non-coronary cusp.   * Small echodensities described on the aortic and mitral valves could   represent vegetations; consider DONNIE for further evaluation if clinically   indicated.              Assessment/Plan:  67 year old male with pmh of htn, hld, present to ED after being sent in by podiatry for surgery on right first toe.     #Right 1st Toe infection / ulcer / cellulitis / osteomyelitis with strep bacteremia:  - MRI noted for OM  - podiatry consult appreciated - POD #43 toe amputation  - wound care per podiatry   - c/w zosyn  - abscess cx morganella, alpha strep  - OR Cx staph   - blood cultures streptococcus  - f/u echo: hypodenistis noted on aortic and mitral valves  - f/u repeat cultures NGTD x 1 so far  -dressing changes per podiatry      # r/o endocarditis    echo noted for sm hypodensities on  mitral and aortic valves which could represent vegetations    DONNIE recommended for further eval    cardiac consult appreciated    DONNIE to be scheduled    #HLD  - statin    #HTN-Essential   - metoprolol    #Anxiety  - supportive care    #DVT Prophylaxis  - venodynes    Dispo:  -continue antibiotics.  d/c planning early next week. most likely will need PICC line and IV abxs set up for home

## 2022-12-04 NOTE — CONSULT NOTE ADULT - PROBLEM SELECTOR RECOMMENDATION 2
Echocardiogram has subtle lesions that could possibly represent vegetations based on clinical scenario -- overall valve function appears ok.   DONNIE appropriate if likely to change duration of Abx therapy; repeat blood cultures are without growth.

## 2022-12-04 NOTE — CONSULT NOTE ADULT - SUBJECTIVE AND OBJECTIVE BOX
CHIEF COMPLAINT: Patient is a 67y old  Male who presents with a chief complaint of Foot Infection     HPI:  67 year old man with a history of hypertension, scoliosis, colon cancer, and recent R great toe infection who presented to the ER at the recommendation of his podiatrist because of worsening wound (gangrene) and need for amputation.; he is unable to identify any exacerbating/alleviating factors; denies associated fever. He underwent Amputation of right great toe on 11/30.  He was found to have Strep bacteremia and Morganella morganii, Staph aureus, Alpha hemolytic Strep, and bacteroides on wound culture.  An echocardiogram was suspicious for possible endocarditis.  Cardiology consult was called because of request for DONNIE to further assess for vegetation.  Mr. Stout denies past history of heart disease.  He blames a scoliosis-related abnormal gait for toe wound.  He describes a good overall baseline functional status.    PAST MEDICAL & SURGICAL HISTORY:  Hypertension  History of colorectal cancer 2004 with chemo and rad  Hyperlipidemia  S/P tonsillectomy  History of ankle surgery right ankle ORIF 2016  History of colon resection with ileostomy 2000 and  later with ileostomy closure  History of hernia repair    SOCIAL HISTORY:   Alcohol: "Social"  Smoking: Nonsmoker    FAMILY HISTORY:   Family history of colon cancer, mother and father    MEDICATIONS  (STANDING):  aspirin  chewable 81 milliGRAM(s) Oral daily  atorvastatin 20 milliGRAM(s) Oral at bedtime  metoprolol succinate ER 25 milliGRAM(s) Oral daily  piperacillin/tazobactam IVPB.. 3.375 Gram(s) IV Intermittent every 8 hours    MEDICATIONS  (PRN):  acetaminophen     Tablet .. 650 milliGRAM(s) Oral every 6 hours PRN Mild Pain (1 - 3)  aluminum hydroxide/magnesium hydroxide/simethicone Suspension 30 milliLiter(s) Oral every 4 hours PRN Dyspepsia  melatonin 3 milliGRAM(s) Oral at bedtime PRN Insomnia  ondansetron Injectable 4 milliGRAM(s) IV Push every 8 hours PRN Nausea and/or Vomiting  oxyCODONE    IR 5 milliGRAM(s) Oral every 4 hours PRN Moderate Pain (4 - 6)  oxyCODONE    IR 10 milliGRAM(s) Oral every 4 hours PRN Severe Pain (7 - 10)    Allergies:  No Known Allergies    REVIEW OF SYSTEMS:  CONSTITUTIONAL: No fevers or chills  Eyes: No visual changes  NECK: No pain or stiffness  RESPIRATORY: No cough, wheezing, hemoptysis; No shortness of breath  CARDIOVASCULAR: No chest pain or palpitations  GASTROINTESTINAL: No abdominal pain. No nausea  GENITOURINARY: No dysuria, frequency or hematuria  NEUROLOGICAL: No numbness.  SKIN: R 1st toe infection / cellulitis  All other review of systems is negative unless indicated above    VITAL SIGNS:   Vital Signs Last 24 Hrs  T(C): 36.6 (04 Dec 2022 09:28), Max: 36.7 (03 Dec 2022 15:25)  T(F): 97.8 (04 Dec 2022 09:28), Max: 98 (03 Dec 2022 15:25)  HR: 72 (04 Dec 2022 09:28) (68 - 83)  BP: 130/82 (04 Dec 2022 09:28) (114/67 - 130/82)  RR: 18 (04 Dec 2022 09:28) (18 - 18)  SpO2: 99% (04 Dec 2022 09:28) (99% - 99%)    PHYSICAL EXAM:  Constitutional: NAD, awake and alert, seated at edge of bed  HEENT: No oral cyanosis.  Pulmonary: Non-labored, breath sounds are clear bilaterally  Cardiovascular: S1 and S2, regular rate and rhythm, no Murmur  Gastrointestinal: Bowel Sounds present, soft, nontender.   Lymph: No peripheral edema. No cervical lymphadenopathy.  Neurological: Alert, no focal deficits  Skin: No rashes.  Psych:  Mood & affect appropriate    LABS:                      13.2   7.66  )-----------( 376      ( 04 Dec 2022 05:10 )             39.7     140    |  109    |  10     ----------------------------<  99     4.0     |  27     |  0.69     Ca    8.4        04 Dec 2022 05:10    TTE Echo Complete w/o Contrast w/ Doppler (12.02.22 @ 13:59):   The left ventricle is normal in size, wall motion and contractility. Estimated left ventricular ejection fraction is 60%.   Normal appearing right ventricle structure and function.   There is focal calcification of the anterior mitral valve leaflet. The leaflet opening is normal. Very small,linear echodensity seen on the anterior mitral leaflet. Trace mitral regurgitation.   Aortic valve sclerosis. Very small, linear echodensity is seen on non-coronary cusp.   * Small echodensities described on the aortic and mitral valves could represent vegetations; consider DONNIE for further evaluation if clinically indicated.    12 Lead ECG (11.29.22 @ 17:31):  Sinus tachycardia  Possible Left atrial enlargement  Right bundle branch block  Abnormal ECG

## 2022-12-04 NOTE — PROGRESS NOTE ADULT - ASSESSMENT
Assessment 66 y/o male see inpatient for the following   -Gangrene of right hallux>> now s/p POD4 amputation of Right hallux  -Cellulitis Right foot  -Pain in Right Foot   -Difficulty with ambulation      Plan:   Chart reviewed and Patient evaluated;   Discussed diagnosis and treatment with patient  Applied betadine soaked gauze, dry sterile dressing.   Sx shoe WBAT to right foot  Continue w/ IV abx per ID  Vascular consult appreciated  Pt s/p POD4 amputation of Right hallux, surgical site stable  Dressings clean/dry/intact.  ID recommendations appreciated    Discussed importance of daily foot examinations and proper shoe gear  Patient demonstrated verbal understanding of all interventions and tolerated interventions well without any complications.   Podiatry will follow while in house.  Case D/W Dr. Daniel Gallagher

## 2022-12-04 NOTE — PROGRESS NOTE ADULT - SUBJECTIVE AND OBJECTIVE BOX
Date of Service: 12/04/22  HPI: 66 y/o male PMHx HTN and HLD presents to ED sent in by podiatry for surgery on right first toe. Pt states he has infection and toe needs to be amputated. Sent in by Dr. Coronado for infection of right toe and possible amputation. Pt reports several weeks of redness in area. X-ray in podiatry office today showing gas gangrene. Pt sent to ER for admission. No travel, no sick contacts, no illegal drugs, no social concerns. Patient says that he has been having this wound to the right foot for the past week and it just blew up. Patient says he is well aware of his pedal pathology, and is understanding of the severity of his infection.     12/04/22: Patient seen by podiatry team with attending present. Patient resting comfortably at bedside, NAD and pleasant. Dressings intact to RLE. Pt is s/p POD4 amputation of Right hallux with I&D.    PMH: Hypertension    History of colorectal cancer    Hyperlipidemia      PSH:S/P tonsillectomy    History of ankle surgery    History of colon resection        Allergies:No Known Allergies    Vital Signs Last 24 Hrs  T(C): 36.6 (04 Dec 2022 09:28), Max: 36.7 (03 Dec 2022 15:25)  T(F): 97.8 (04 Dec 2022 09:28), Max: 98 (03 Dec 2022 15:25)  HR: 72 (04 Dec 2022 09:28) (68 - 74)  BP: 130/82 (04 Dec 2022 09:28) (119/85 - 130/82)  BP(mean): --  RR: 18 (04 Dec 2022 09:28) (18 - 18)  SpO2: 99% (04 Dec 2022 09:28) (99% - 99%)    Parameters below as of 04 Dec 2022 09:28  Patient On (Oxygen Delivery Method): room air      MEDICATIONS  (STANDING):  aspirin  chewable 81 milliGRAM(s) Oral daily  atorvastatin 20 milliGRAM(s) Oral at bedtime  metoprolol succinate ER 25 milliGRAM(s) Oral daily  piperacillin/tazobactam IVPB.. 3.375 Gram(s) IV Intermittent every 8 hours    MEDICATIONS  (PRN):  acetaminophen     Tablet .. 650 milliGRAM(s) Oral every 6 hours PRN Mild Pain (1 - 3)  aluminum hydroxide/magnesium hydroxide/simethicone Suspension 30 milliLiter(s) Oral every 4 hours PRN Dyspepsia  melatonin 3 milliGRAM(s) Oral at bedtime PRN Insomnia  ondansetron Injectable 4 milliGRAM(s) IV Push every 8 hours PRN Nausea and/or Vomiting  oxyCODONE    IR 5 milliGRAM(s) Oral every 4 hours PRN Moderate Pain (4 - 6)  oxyCODONE    IR 10 milliGRAM(s) Oral every 4 hours PRN Severe Pain (7 - 10)        Physical Exam:   Constitutional NAD, alert;  Lower Extremity Focus:  Derm:  Skin warm, dry and supple bilateral.  Dressings clean, dry, intact without strikethrough. Incision site stable with intact sutures without indication of dehiscence. Scant sanguinous drainage noted.   Vascular: Dorsalis Pedis and Posterior Tibial pulses 1/4.  Capillary re-fill time less then 3 seconds digits 1-5 bilateral.   Neuro: Protective sensation Severely Diminished to the level of the digits bilateral.  MSK: Muscle strength 4/5 in all major muscle groups of Right lower extremity. 5/5 in all muscle groups of LLE;  .        Labs:                                   13.2   7.66  )-----------( 376      ( 04 Dec 2022 05:10 )             39.7     12-04    140  |  109<H>  |  10  ----------------------------<  99  4.0   |  27  |  0.69    Ca    8.4<L>      04 Dec 2022 05:10      Chem    Rad/EKG  < from: Xray Foot AP + Lateral + Oblique, Right (11.29.22 @ 18:35) >    Orthopedic hardware in the lower right fibula noted for now healed   fracture with good alignment.    There is erosion of the soft tissues of the distal great toe. There may   be underlying destruction of the distal bone. An MR has been scheduled.    There is severe flexion of the DIP joint of the second toe obscuring   detail.    There may be destruction of this toe as well.    IMPRESSION: Positive right foot findings. MR scheduled. Chest   unremarkable.      --- End of Report ---      < end of copied text >

## 2022-12-04 NOTE — CONSULT NOTE ADULT - PROBLEM SELECTOR RECOMMENDATION 9
Streptococcus constellatus bacteremia -- medical team has requested a DONNIE.  I discussed procedure with patient in detail and he is agreeable -- will schedule.  Continue IV antibiotics as per ID.

## 2022-12-05 LAB
-  CEFTRIAXONE: SIGNIFICANT CHANGE UP
-  CLINDAMYCIN: SIGNIFICANT CHANGE UP
-  ERYTHROMYCIN: SIGNIFICANT CHANGE UP
-  LEVOFLOXACIN: SIGNIFICANT CHANGE UP
-  PENICILLIN: SIGNIFICANT CHANGE UP
-  VANCOMYCIN: SIGNIFICANT CHANGE UP
CULTURE RESULTS: SIGNIFICANT CHANGE UP
METHOD TYPE: SIGNIFICANT CHANGE UP
METHOD TYPE: SIGNIFICANT CHANGE UP
ORGANISM # SPEC MICROSCOPIC CNT: SIGNIFICANT CHANGE UP
SARS-COV-2 RNA SPEC QL NAA+PROBE: SIGNIFICANT CHANGE UP
SPECIMEN SOURCE: SIGNIFICANT CHANGE UP

## 2022-12-05 PROCEDURE — 99233 SBSQ HOSP IP/OBS HIGH 50: CPT

## 2022-12-05 RX ADMIN — OXYCODONE HYDROCHLORIDE 10 MILLIGRAM(S): 5 TABLET ORAL at 21:04

## 2022-12-05 RX ADMIN — OXYCODONE HYDROCHLORIDE 10 MILLIGRAM(S): 5 TABLET ORAL at 14:58

## 2022-12-05 RX ADMIN — PIPERACILLIN AND TAZOBACTAM 25 GRAM(S): 4; .5 INJECTION, POWDER, LYOPHILIZED, FOR SOLUTION INTRAVENOUS at 14:58

## 2022-12-05 RX ADMIN — Medication 81 MILLIGRAM(S): at 14:59

## 2022-12-05 RX ADMIN — Medication 650 MILLIGRAM(S): at 21:04

## 2022-12-05 RX ADMIN — OXYCODONE HYDROCHLORIDE 10 MILLIGRAM(S): 5 TABLET ORAL at 15:28

## 2022-12-05 RX ADMIN — ATORVASTATIN CALCIUM 20 MILLIGRAM(S): 80 TABLET, FILM COATED ORAL at 21:04

## 2022-12-05 RX ADMIN — PIPERACILLIN AND TAZOBACTAM 25 GRAM(S): 4; .5 INJECTION, POWDER, LYOPHILIZED, FOR SOLUTION INTRAVENOUS at 05:26

## 2022-12-05 RX ADMIN — Medication 25 MILLIGRAM(S): at 14:58

## 2022-12-05 RX ADMIN — PIPERACILLIN AND TAZOBACTAM 25 GRAM(S): 4; .5 INJECTION, POWDER, LYOPHILIZED, FOR SOLUTION INTRAVENOUS at 21:04

## 2022-12-05 NOTE — PROGRESS NOTE ADULT - PROBLEM SELECTOR PLAN 1
Streptococcus constellatus bacteremia -- medical team requested a DONNIE.  DONNIE w/o signs of endocarditis.  Further management per ID reccs. Streptococcus constellatus bacteremia -- medical team requested a DONNIE.  DONNIE w/o signs of endocarditis.  Further management per ID reccs.Will sign off please call if questions.

## 2022-12-05 NOTE — CHART NOTE - NSCHARTNOTEFT_GEN_A_CORE
TRANSESOPHAGEAL ECHOCARDIOGRAPHY PROCEDURE NOTE    Indication: R/o endocarditis.    Sedation:   propofol, see anesthesiology note for details.    Findings:     The left ventricle is normal in size, wall motion and contractility.   Estimated left ventricular ejection fraction is 60%.   There is a small calcified lesion on the ventricular surface of the   anterior leaflet of the mitral valve.   MIld aortic sclerosis.   No evidence of endocarditis.    Complications: None

## 2022-12-05 NOTE — PROGRESS NOTE ADULT - SUBJECTIVE AND OBJECTIVE BOX
CC: Foot infection  History of Present Illness:   67 year old male with pmh of htn, hld, present to ED after being sent in by podiatry for surgery on right first toe. per patient was being followed by podiatry however was not able to follow up with them due to thanksgiving and travel. states has ulcer on toe that opened up and was told by podiatry will need amputation. states no fevers or chills. no chest pain or sob.     post amputation no c/o      REVIEW OF SYSTEMS: All other review of systems is negative unless indicated above.    MEDICATIONS  (STANDING):  aspirin  chewable 81 milliGRAM(s) Oral daily  atorvastatin 20 milliGRAM(s) Oral at bedtime  metoprolol succinate ER 25 milliGRAM(s) Oral daily  piperacillin/tazobactam IVPB.. 3.375 Gram(s) IV Intermittent every 8 hours    MEDICATIONS  (PRN):  acetaminophen     Tablet .. 650 milliGRAM(s) Oral every 6 hours PRN Mild Pain (1 - 3)  aluminum hydroxide/magnesium hydroxide/simethicone Suspension 30 milliLiter(s) Oral every 4 hours PRN Dyspepsia  melatonin 3 milliGRAM(s) Oral at bedtime PRN Insomnia  ondansetron Injectable 4 milliGRAM(s) IV Push every 8 hours PRN Nausea and/or Vomiting  oxyCODONE    IR 5 milliGRAM(s) Oral every 4 hours PRN Moderate Pain (4 - 6)  oxyCODONE    IR 10 milliGRAM(s) Oral every 4 hours PRN Severe Pain (7 - 10)      PHYSICAL EXAM:    GENERAL: Comfortable, no acute distress   HEAD:  Normocephalic, atraumatic  EYES: EOMI, PERRLA  HEENT: Moist mucous membranes  NECK: Supple, No JVD  NERVOUS SYSTEM:  Alert & Oriented X3, Motor Strength 5/5 B/L upper and lower extremities  CHEST/LUNG: Clear to auscultation bilaterally  HEART: Regular rate and rhythm  ABDOMEN: Soft, non tender, Nondistended, Bowel sounds present  GENITOURINARY: Voiding, no palpable bladder  EXTREMITIES:   No clubbing, cyanosis, or edema  MUSCULOSKELETAL- right foot dsg c/d/i  SKIN-no rash        labs:                          13.2   7.66  )-----------( 376      ( 04 Dec 2022 05:10 )             39.7       12-04    140  |  109<H>  |  10  ----------------------------<  99  4.0   |  27  |  0.69    Ca    8.4<L>      04 Dec 2022 05:10                    < from: TTE Echo Complete w/o Contrast w/ Doppler (12.02.22 @ 13:59) >   Summary     The left ventricle is normal in size, wall motion and contractility.   Estimated left ventricular ejection fraction is 60%.   Normal appearing right ventricle structure and function.   There is focal calcification of the anterior mitral valve leaflet. The   leaflet opening is normal. Very small,linear echodensity seen on the   anterior mitral leaflet. Trace mitral regurgitation.   Aortic valve sclerosis. Very small, linear echodensity is seen on   non-coronary cusp.   * Small echodensities described on the aortic and mitral valves could   represent vegetations; consider DONNIE for further evaluation if clinically   indicated.              Assessment/Plan:  67 year old male with pmh of htn, hld, present to ED after being sent in by podiatry for surgery on right first toe.     #Right 1st Toe infection / ulcer / cellulitis / osteomyelitis with strep bacteremia:  - MRI noted for OM  - podiatry consult appreciated - POD #43 toe amputation  - wound care per podiatry   - c/w zosyn  - abscess cx morganella, alpha strep  - OR Cx staph   - blood cultures streptococcus  - f/u echo: hypodenistis noted on aortic and mitral valves  - f/u repeat cultures NGTD x 1 so far  -dressing changes per podiatry      # r/o endocarditis    echo noted for sm hypodensities on  mitral and aortic valves which could represent vegetations    DONNIE recommended for further eval    cardiac consult appreciated    DONNIE to be scheduled    #HLD  - statin    #HTN-Essential   - metoprolol    #Anxiety  - supportive care    #DVT Prophylaxis  - venodynes    Dispo:  -continue antibiotics.  d/c planning early next week. most likely will need PICC line and IV abxs set up for home

## 2022-12-05 NOTE — PROGRESS NOTE ADULT - SUBJECTIVE AND OBJECTIVE BOX
Date of Service: 12/5/22  HPI: 66 y/o male PMHx HTN and HLD presents to ED sent in by podiatry for surgery on right first toe. Pt states he has infection and toe needs to be amputated. Sent in by Dr. Coronado for infection of right toe and possible amputation. Pt reports several weeks of redness in area. X-ray in podiatry office today showing gas gangrene. Pt sent to ER for admission. No travel, no sick contacts, no illegal drugs, no social concerns. Patient says that he has been having this wound to the right foot for the past week and it just blew up. Patient says he is well aware of his pedal pathology, and is understanding of the severity of his infection.     12/5/22: Patient seen by podiatry team. Patient resting comfortably at bedside, NAD and pleasant. Dressings intact to RLE. Pt is s/p POD5 amputation of Right hallux with I&D.    PMH: Hypertension    History of colorectal cancer    Hyperlipidemia      PSH:S/P tonsillectomy    History of ankle surgery    History of colon resection        Allergies:No Known Allergies       Vital Signs Last 24 Hrs  T(C): 36.3 (05 Dec 2022 11:38), Max: 36.6 (04 Dec 2022 15:53)  T(F): 97.4 (05 Dec 2022 11:38), Max: 97.8 (04 Dec 2022 15:53)  HR: 80 (05 Dec 2022 11:38) (70 - 85)  BP: 137/64 (05 Dec 2022 11:38) (103/54 - 137/64)  BP(mean): --  RR: 16 (05 Dec 2022 11:38) (16 - 18)  SpO2: 99% (05 Dec 2022 11:38) (94% - 99%)    Parameters below as of 05 Dec 2022 11:38  Patient On (Oxygen Delivery Method): room air      MEDICATIONS  (STANDING):  aspirin  chewable 81 milliGRAM(s) Oral daily  atorvastatin 20 milliGRAM(s) Oral at bedtime  metoprolol succinate ER 25 milliGRAM(s) Oral daily  piperacillin/tazobactam IVPB.. 3.375 Gram(s) IV Intermittent every 8 hours    MEDICATIONS  (PRN):  acetaminophen     Tablet .. 650 milliGRAM(s) Oral every 6 hours PRN Mild Pain (1 - 3)  aluminum hydroxide/magnesium hydroxide/simethicone Suspension 30 milliLiter(s) Oral every 4 hours PRN Dyspepsia  melatonin 3 milliGRAM(s) Oral at bedtime PRN Insomnia  ondansetron Injectable 4 milliGRAM(s) IV Push every 8 hours PRN Nausea and/or Vomiting  oxyCODONE    IR 5 milliGRAM(s) Oral every 4 hours PRN Moderate Pain (4 - 6)  oxyCODONE    IR 10 milliGRAM(s) Oral every 4 hours PRN Severe Pain (7 - 10)          Physical Exam:   Constitutional NAD, alert;  Lower Extremity Focus:  Derm:  Skin warm, dry and supple bilateral.  Dressings clean, dry, intact without strikethrough. Incision site stable with intact sutures without indication of dehiscence. Scant sanguinous drainage noted.   Vascular: Dorsalis Pedis and Posterior Tibial pulses 1/4.  Capillary re-fill time less then 3 seconds digits 1-5 bilateral.   Neuro: Protective sensation Severely Diminished to the level of the digits bilateral.  MSK: Muscle strength 4/5 in all major muscle groups of Right lower extremity. 5/5 in all muscle groups of LLE;  .        Labs:                        13.2   7.66  )-----------( 376      ( 04 Dec 2022 05:10 )             39.7     12-04    140  |  109<H>  |  10  ----------------------------<  99  4.0   |  27  |  0.69    Ca    8.4<L>      04 Dec 2022 05:10         Rad/EKG  < from: Xray Foot AP + Lateral + Oblique, Right (11.29.22 @ 18:35) >    Orthopedic hardware in the lower right fibula noted for now healed   fracture with good alignment.    There is erosion of the soft tissues of the distal great toe. There may   be underlying destruction of the distal bone. An MR has been scheduled.    There is severe flexion of the DIP joint of the second toe obscuring   detail.    There may be destruction of this toe as well.    IMPRESSION: Positive right foot findings. MR scheduled. Chest   unremarkable.      --- End of Report ---      < end of copied text >

## 2022-12-05 NOTE — PACU DISCHARGE NOTE - COMMENTS
Report given to Bri[facility] RN. Patient denies pain or discomfort. Transferred back to unit with transport staff.

## 2022-12-05 NOTE — PROGRESS NOTE ADULT - ASSESSMENT
Assessment 68 y/o male see inpatient for the following   -Gangrene of right hallux>> now s/p POD5 amputation of Right hallux  -Cellulitis Right foot  -Pain in Right Foot   -Difficulty with ambulation      Plan:   Chart reviewed and Patient evaluated;   Discussed diagnosis and treatment with patient  Applied betadine soaked gauze, dry sterile dressing.   Sx shoe WBAT to right foot  Continue w/ IV abx per ID  Vascular consult appreciated  Pt s/p POD5 amputation of Right hallux, surgical site stable  Dressings clean/dry/intact.  ID recommendations appreciated    Discussed importance of daily foot examinations and proper shoe gear  Patient demonstrated verbal understanding of all interventions and tolerated interventions well without any complications.   Podiatry will follow while in house.  Case D/W Dr. Daniel Gallagher

## 2022-12-05 NOTE — PROGRESS NOTE ADULT - PROBLEM SELECTOR PLAN 2
DONNIE w/ no significant signs of endocarditis. DONNIE w/ no significant signs of endocarditis.  Will sign off please call if questions.

## 2022-12-05 NOTE — PROCEDURAL SAFETY CHECKLIST WITH OR WITHOUT SEDATION - NSPOSTCOMMENTFT_GEN_ALL_CORE
Probe in at    . Bubble study at     ,Tolerated procedure well. Probe out at  . Probe in at 1025. Bubble study at 1038, Tolerated procedure well. Probe out at 1040.

## 2022-12-06 ENCOUNTER — TRANSCRIPTION ENCOUNTER (OUTPATIENT)
Age: 67
End: 2022-12-06

## 2022-12-06 VITALS
DIASTOLIC BLOOD PRESSURE: 62 MMHG | TEMPERATURE: 98 F | OXYGEN SATURATION: 100 % | HEART RATE: 78 BPM | SYSTOLIC BLOOD PRESSURE: 125 MMHG | RESPIRATION RATE: 18 BRPM

## 2022-12-06 PROCEDURE — 99239 HOSP IP/OBS DSCHRG MGMT >30: CPT

## 2022-12-06 PROCEDURE — 71045 X-RAY EXAM CHEST 1 VIEW: CPT | Mod: 26

## 2022-12-06 RX ORDER — OXYCODONE HYDROCHLORIDE 5 MG/1
1 TABLET ORAL
Qty: 15 | Refills: 0
Start: 2022-12-06 | End: 2022-12-10

## 2022-12-06 RX ORDER — ERTAPENEM SODIUM 1 G/1
1 INJECTION, POWDER, LYOPHILIZED, FOR SOLUTION INTRAMUSCULAR; INTRAVENOUS
Qty: 0 | Refills: 0 | DISCHARGE
Start: 2022-12-06

## 2022-12-06 RX ORDER — ERTAPENEM SODIUM 1 G/1
1000 INJECTION, POWDER, LYOPHILIZED, FOR SOLUTION INTRAMUSCULAR; INTRAVENOUS EVERY 24 HOURS
Refills: 0 | Status: DISCONTINUED | OUTPATIENT
Start: 2022-12-06 | End: 2022-12-06

## 2022-12-06 RX ADMIN — Medication 81 MILLIGRAM(S): at 09:07

## 2022-12-06 RX ADMIN — ERTAPENEM SODIUM 120 MILLIGRAM(S): 1 INJECTION, POWDER, LYOPHILIZED, FOR SOLUTION INTRAMUSCULAR; INTRAVENOUS at 18:51

## 2022-12-06 RX ADMIN — Medication 650 MILLIGRAM(S): at 00:39

## 2022-12-06 RX ADMIN — PIPERACILLIN AND TAZOBACTAM 25 GRAM(S): 4; .5 INJECTION, POWDER, LYOPHILIZED, FOR SOLUTION INTRAVENOUS at 06:24

## 2022-12-06 RX ADMIN — OXYCODONE HYDROCHLORIDE 10 MILLIGRAM(S): 5 TABLET ORAL at 00:38

## 2022-12-06 RX ADMIN — OXYCODONE HYDROCHLORIDE 10 MILLIGRAM(S): 5 TABLET ORAL at 16:03

## 2022-12-06 RX ADMIN — Medication 25 MILLIGRAM(S): at 09:07

## 2022-12-06 NOTE — PROGRESS NOTE ADULT - REASON FOR ADMISSION
Foot Infection

## 2022-12-06 NOTE — PROGRESS NOTE ADULT - ASSESSMENT
Assessment 66 y/o male see inpatient for the following   -Gangrene of right hallux>> now s/p POD 6 amputation of Right hallux  -Cellulitis Right foot  -Pain in Right Foot   -Difficulty with ambulation      Plan:   Chart reviewed and Patient evaluated;   Discussed diagnosis and treatment with patient  Applied betadine soaked gauze, dry sterile dressing.   Sx shoe WBAT to right foot  Continue w/ IV abx per ID  Vascular consult appreciated  Pt s/p POD6 amputation of Right hallux, surgical site stable  Dressings clean/dry/intact.  ID recommendations appreciated    Discussed importance of daily foot examinations and proper shoe gear  Patient demonstrated verbal understanding of all interventions and tolerated interventions well without any complications.   Podiatry will follow while in house.  Case D/W Dr. Daniel Gallagher

## 2022-12-06 NOTE — ADVANCED PRACTICE NURSE CONSULT - ASSESSMENT
Patient is awake and alert. PICC insertion along with risks, benefits, possible complications and infection prevention explained to pt who verbalized understanding. All questions addressed and written signed consent placed on chart. Time out along with hand washing by 2 RN's done. Right arm cleansed with CHG. Patient draped in usual fashion with sterile sheet. Lidocaine 1%, 2ml given intradermally to right arm marked site. Using strict sterile technique, under ultrasound guidance, placed Navilyst Bioflo with Endex and PASV technology 4F single lumen PICC (lot# [id number]) cut to 48cm into right basilic vein. Brisk blood return and flushed with 40ml NS. Minimal blood loss and pt tolerated procedure well. All sharps accounted for, dressing and end cap in place. Xray done and report given to district RN.

## 2022-12-06 NOTE — DISCHARGE NOTE PROVIDER - NSDCMRMEDTOKEN_GEN_ALL_CORE_FT
aspirin 81 mg oral tablet, chewable: 1 tab(s) orally once a day  atorvastatin 20 mg oral tablet: 1 tab(s) orally once a day  ertapenem 1 g injection: 1 gram(s) injectable once a day  metoprolol succinate 25 mg oral tablet, extended release: 1 tab(s) orally once a day  oxyCODONE 5 mg oral tablet: 1 tab(s) orally every 8 hours, As Needed -Moderate Pain (4 - 6) MDD:3

## 2022-12-06 NOTE — PROGRESS NOTE ADULT - ASSESSMENT
67 year old male with pmh of htn, hld, colon cancer in the past admitted on 11/29 from his podiatry office for evaluation of bogginess of right first toe; the patient has been doing routine wound care but missed appointment due to holiday and travel; has an ulcer that is on first toe and distal first toe is gangrenous and he is expected to go to the OR today. Denies any fever or chills. Had a remote fracture in the right ankle in the past and has plate and screws in this ankle.     1. Patient admitted with right foot cellulitis and gangrene of the right first toe; also noted with leukocytosis most likely reactive to infection  - follow up cultures --- Morganella on preop culture and gram negative rods in OR culture, sensitivity and identification pending; in addition has been found to have anaerobes, Staph aureus in OR cultures; blood culture positive with Strep constellatus  - DONNIE negative for endocarditis  - multiple organisms in wound cultures  - will optimize antibiotics to ertapenem and have ordered picc line so as to continue treatment with ertapenem one gram daily until 1/11/23 with weekly cbc, cmp, esr, crp  - repeat blood cultures are no growth  - wound care per podiatry  2. other issues; per medicine

## 2022-12-06 NOTE — DISCHARGE NOTE NURSING/CASE MANAGEMENT/SOCIAL WORK - PATIENT PORTAL LINK FT
You can access the FollowMyHealth Patient Portal offered by [facility] by registering at the following website: [email or web address]. By joining [employer]’s FollowMyHealth portal, you will also be able to view your health information using other applications (apps) compatible with our system.

## 2022-12-06 NOTE — PROGRESS NOTE ADULT - PROVIDER SPECIALTY LIST ADULT
Infectious Disease
Podiatry
Hospitalist
Podiatry
Infectious Disease
Infectious Disease
Podiatry
Cardiology

## 2022-12-06 NOTE — PROGRESS NOTE ADULT - SUBJECTIVE AND OBJECTIVE BOX
Date of Service: 12/6/22  HPI: 66 y/o male PMHx HTN and HLD presents to ED sent in by podiatry for surgery on right first toe. Pt states he has infection and toe needs to be amputated. Sent in by Dr. Coronado for infection of right toe and possible amputation. Pt reports several weeks of redness in area. X-ray in podiatry office today showing gas gangrene. Pt sent to ER for admission. No travel, no sick contacts, no illegal drugs, no social concerns. Patient says that he has been having this wound to the right foot for the past week and it just blew up. Patient says he is well aware of his pedal pathology, and is understanding of the severity of his infection.     12/6/22: Patient seen by podiatry team. Patient resting comfortably at bedside, NAD and pleasant. Dressings intact to RLE. Pt is s/p POD 6 amputation of Right hallux with I&D.    PMH: Hypertension    History of colorectal cancer    Hyperlipidemia      PSH:S/P tonsillectomy    History of ankle surgery    History of colon resection        Allergies:No Known Allergies    Vital Signs Last 24 Hrs  T(C): 36.7 (06 Dec 2022 06:30), Max: 36.7 (06 Dec 2022 06:30)  T(F): 98 (06 Dec 2022 06:30), Max: 98 (06 Dec 2022 06:30)  HR: 71 (06 Dec 2022 06:30) (71 - 80)  BP: 120/70 (06 Dec 2022 06:30) (115/67 - 120/70)  BP(mean): --  RR: 18 (06 Dec 2022 06:30) (16 - 18)  SpO2: 98% (06 Dec 2022 06:30) (96% - 98%)    Parameters below as of 05 Dec 2022 16:21  Patient On (Oxygen Delivery Method): room air        MEDICATIONS  (STANDING):  aspirin  chewable 81 milliGRAM(s) Oral daily  atorvastatin 20 milliGRAM(s) Oral at bedtime  ertapenem  IVPB 1000 milliGRAM(s) IV Intermittent every 24 hours  metoprolol succinate ER 25 milliGRAM(s) Oral daily    MEDICATIONS  (PRN):  acetaminophen     Tablet .. 650 milliGRAM(s) Oral every 6 hours PRN Mild Pain (1 - 3)  aluminum hydroxide/magnesium hydroxide/simethicone Suspension 30 milliLiter(s) Oral every 4 hours PRN Dyspepsia  melatonin 3 milliGRAM(s) Oral at bedtime PRN Insomnia  ondansetron Injectable 4 milliGRAM(s) IV Push every 8 hours PRN Nausea and/or Vomiting  oxyCODONE    IR 5 milliGRAM(s) Oral every 4 hours PRN Moderate Pain (4 - 6)  oxyCODONE    IR 10 milliGRAM(s) Oral every 4 hours PRN Severe Pain (7 - 10)          Physical Exam:   Constitutional NAD, alert;  Lower Extremity Focus:  Derm:  Skin warm, dry and supple bilateral.  Dressings clean, dry, intact without strikethrough. Incision site stable with intact sutures without indication of dehiscence. Scant sanguinous drainage noted.   Vascular: Dorsalis Pedis and Posterior Tibial pulses 1/4.  Capillary re-fill time less then 3 seconds digits 1-5 bilateral.   Neuro: Protective sensation Severely Diminished to the level of the digits bilateral.  MSK: Muscle strength 4/5 in all major muscle groups of Right lower extremity. 5/5 in all muscle groups of LLE;  .        Labs:                        13.2   7.66  )-----------( 376      ( 04 Dec 2022 05:10 )             39.7     12-04    140  |  109<H>  |  10  ----------------------------<  99  4.0   |  27  |  0.69    Ca    8.4<L>      04 Dec 2022 05:10         Rad/EKG  < from: Xray Foot AP + Lateral + Oblique, Right (11.29.22 @ 18:35) >    Orthopedic hardware in the lower right fibula noted for now healed   fracture with good alignment.    There is erosion of the soft tissues of the distal great toe. There may   be underlying destruction of the distal bone. An MR has been scheduled.    There is severe flexion of the DIP joint of the second toe obscuring   detail.    There may be destruction of this toe as well.    IMPRESSION: Positive right foot findings. MR scheduled. Chest   unremarkable.      --- End of Report ---      < end of copied text >

## 2022-12-06 NOTE — DISCHARGE NOTE PROVIDER - CARE PROVIDER_API CALL
Daniel Gallagher (DPM)  Orthopaedic Surgery Surgery  [address], [address]  [place] [address]  Phone: [phone]  Fax: [phone]  Follow Up Time: 1 week    [facility] [place] [address]  Phone: [phone]  Fax: [phone]  Follow Up Time: 2 weeks

## 2022-12-06 NOTE — DISCHARGE NOTE PROVIDER - HOSPITAL COURSE
67 year old male with pmh of htn, hld, present to ED after being sent in by podiatry for surgery on right first toe.     #Right 1st Toe infection / ulcer / cellulitis / osteomyelitis with strep bacteremia:  - MRI noted for OM  - podiatry consult appreciated - POD toe amputation  - wound care per podiatry   - c/w zosyn  - abscess cx morganella, alpha strep  - OR Cx staph   - blood cultures streptococcus  -dressing changes per podiatry      d/c home on Ertapamen until 1/11

## 2022-12-06 NOTE — PROGRESS NOTE ADULT - SUBJECTIVE AND OBJECTIVE BOX
Date of service: 12-06-22 @ 12:37      Patient sitting in bed; seen earlier, had DONNIE yesterday; no vegetations seen  Afebrile      ROS: no fever or chills; denies dizziness, no HA, no SOB or cough, no abdominal pain, no diarrhea or constipation; no dysuria, no urinary frequency, no legs pain, no rashes    MEDICATIONS  (STANDING):  aspirin  chewable 81 milliGRAM(s) Oral daily  atorvastatin 20 milliGRAM(s) Oral at bedtime  ertapenem  IVPB 1000 milliGRAM(s) IV Intermittent every 24 hours  metoprolol succinate ER 25 milliGRAM(s) Oral daily    MEDICATIONS  (PRN):  acetaminophen     Tablet .. 650 milliGRAM(s) Oral every 6 hours PRN Mild Pain (1 - 3)  aluminum hydroxide/magnesium hydroxide/simethicone Suspension 30 milliLiter(s) Oral every 4 hours PRN Dyspepsia  melatonin 3 milliGRAM(s) Oral at bedtime PRN Insomnia  ondansetron Injectable 4 milliGRAM(s) IV Push every 8 hours PRN Nausea and/or Vomiting  oxyCODONE    IR 5 milliGRAM(s) Oral every 4 hours PRN Moderate Pain (4 - 6)  oxyCODONE    IR 10 milliGRAM(s) Oral every 4 hours PRN Severe Pain (7 - 10)      Vital Signs Last 24 Hrs  T(C): 36.7 (06 Dec 2022 06:30), Max: 36.7 (06 Dec 2022 06:30)  T(F): 98 (06 Dec 2022 06:30), Max: 98 (06 Dec 2022 06:30)  HR: 71 (06 Dec 2022 06:30) (71 - 80)  BP: 120/70 (06 Dec 2022 06:30) (115/67 - 120/70)  BP(mean): --  RR: 18 (06 Dec 2022 06:30) (16 - 18)  SpO2: 98% (06 Dec 2022 06:30) (96% - 98%)    Parameters below as of 05 Dec 2022 16:21  Patient On (Oxygen Delivery Method): room air            Physical Exam:          Constitutional: frail looking  HEENT: NC/AT, EOMI, PERRLA, conjunctivae clear; ears and nose atraumatic; pharynx clear  Neck: supple; thyroid not palpable  Back: no tenderness  Respiratory: respiratory effort normal; clear to auscultation  Cardiovascular: S1S2 regular, no murmurs  Abdomen: soft, not tender, not distended, positive BS; no liver or spleen organomegaly  Genitourinary: no suprapubic tenderness  Musculoskeletal: no muscle tenderness, right foot with surgical dressing intact  Neurological/ Psychiatric: AxOx3, judgement and insight normal;  moving all extremities  Skin: no rashes; no palpable lesions    Labs: all available labs reviewed                     Labs:                 Cultures:       Culture - Blood (collected 12-03-22 @ 04:58)  Source: .Blood None  Preliminary Report (12-04-22 @ 10:01):    No growth to date.    Culture - Blood (collected 12-03-22 @ 04:58)  Source: .Blood None  Preliminary Report (12-04-22 @ 10:01):    No growth to date.    Culture - Acid Fast - Tissue w/Smear (collected 11-30-22 @ 16:14)  Source: .Tissue RIGHT HALLUX BONE CX  Preliminary Report (12-03-22 @ 15:05):    Culture is being performed.    Culture - Fungal, Tissue (collected 11-30-22 @ 16:14)  Source: .Tissue RIGHT HALLUX BONE CX  Preliminary Report (12-01-22 @ 11:00):    Testing in progress    Culture - Tissue with Gram Stain (collected 11-30-22 @ 16:14)  Source: .Tissue RIGHT HALLUX BONE CX  Gram Stain (12-01-22 @ 04:45):    No polymorphonuclear cells seen per low power field    Rare Gram Negative Rods per oil power field  Final Report (12-03-22 @ 11:49):    Few Morganella morganii    Rare Staphylococcus aureus    Few Alpha hemolytic strep "Susceptibilities not performed"    Few Bacteroides ovatus group "Susceptibilities not performed"  Organism: Morganella morganii  Staphylococcus aureus (12-03-22 @ 11:49)  Organism: Staphylococcus aureus (12-03-22 @ 11:49)      -  Ampicillin/Sulbactam: S <=8/4      -  Cefazolin: S <=4      -  Clindamycin: S <=0.25      -  Erythromycin: S <=0.25      -  Gentamicin: S <=1 Should not be used as monotherapy      -  Oxacillin: S <=0.25 Oxacillin predicts susceptibility for dicloxacillin, methicillin, and nafcillin      -  Penicillin: R 1      -  Rifampin: S <=1 Should not be used as monotherapy      -  Tetracycline: S <=1      -  Trimethoprim/Sulfamethoxazole: S <=0.5/9.5      -  Vancomycin: S 1      Method Type: DIANE  Organism: Morganella morganii (12-03-22 @ 11:49)      -  Amikacin: S <=16      -  Amoxicillin/Clavulanic Acid: R >16/8      -  Ampicillin: R >16 These ampicillin results predict results for amoxicillin      -  Ampicillin/Sulbactam: S 8/4 Enterobacter, Klebsiella aerogenes, Citrobacter, and Serratia may develop resistance during prolonged therapy (3-4 days)      -  Aztreonam: S <=4      -  Cefazolin: R >16 Enterobacter, Klebsiella aerogenes, Citrobacter, and Serratia may develop resistance during prolonged therapy (3-4 days)      -  Cefepime: S <=2      -  Cefoxitin: S <=8      -  Ceftazidime/Avibactam: S <=4      -  Ceftolozane/tazobactam: S <=2      -  Ceftriaxone: S <=1 Enterobacter, Klebsiella aerogenes, Citrobacter, and Serratia may develop resistance during prolonged therapy      -  Ciprofloxacin: S <=0.25      -  Ertapenem: S <=0.5      -  Gentamicin: S <=2      -  Imipenem: I 2      -  Levofloxacin: S <=0.5      -  Meropenem: S <=1      -  Piperacillin/Tazobactam: S <=8      -  Tobramycin: S <=2      -  Trimethoprim/Sulfamethoxazole: S <=0.5/9.5      Method Type: DIANE    Culture - Abscess with Gram Stain (collected 11-29-22 @ 18:23)  Source: .Abscess None  Final Report (12-03-22 @ 02:34):    Moderate Morganella morganii    Few Alpha hemolytic strep "Susceptibilities not performed"    Few Staphylococcus aureus    Moderate Prevotella melaninogenica "Susceptibilities not performed"  Organism: Morganella morganii  Staphylococcus aureus (12-03-22 @ 02:34)  Organism: Staphylococcus aureus (12-03-22 @ 02:34)      -  Ampicillin/Sulbactam: S <=8/4      -  Cefazolin: S <=4      -  Clindamycin: S <=0.25      -  Erythromycin: S <=0.25      -  Gentamicin: S <=1 Should not be used as monotherapy      -  Oxacillin: S 0.5 Oxacillin predicts susceptibility for dicloxacillin, methicillin, and nafcillin      -  Penicillin: R 8      -  Rifampin: S <=1 Should not be used as monotherapy      -  Tetracycline: S <=1      -  Trimethoprim/Sulfamethoxazole: S <=0.5/9.5      -  Vancomycin: S 2      Method Type: DIANE  Organism: Morganella morganii (12-03-22 @ 02:34)      -  Amikacin: S <=16      -  Amoxicillin/Clavulanic Acid: R >16/8      -  Ampicillin: R >16 These ampicillin results predict results for amoxicillin      -  Ampicillin/Sulbactam: S 8/4 Enterobacter, Klebsiella aerogenes, Citrobacter, and Serratia may develop resistance during prolonged therapy (3-4 days)      -  Aztreonam: S <=4      -  Cefazolin: R >16 Enterobacter, Klebsiella aerogenes, Citrobacter, and Serratia may develop resistance during prolonged therapy (3-4 days)      -  Cefepime: S <=2      -  Cefoxitin: S <=8      -  Ceftriaxone: S <=1 Enterobacter, Klebsiella aerogenes, Citrobacter, and Serratia may develop resistance during prolonged therapy      -  Ciprofloxacin: S <=0.25      -  Ertapenem: S <=0.5      -  Gentamicin: S <=2      -  Imipenem: S <=1      -  Levofloxacin: S <=0.5      -  Meropenem: S <=1      -  Piperacillin/Tazobactam: S <=8      -  Tobramycin: S <=2      -  Trimethoprim/Sulfamethoxazole: S <=0.5/9.5      Method Type: DIANE    Culture - Urine (collected 11-29-22 @ 18:20)  Source: Clean Catch None  Final Report (12-01-22 @ 07:21):    <10,000 CFU/mL Normal Urogenital Jennifer    Culture - Blood (collected 11-29-22 @ 18:20)  Source: .Blood None  Gram Stain (12-02-22 @ 14:58):    Growth in aerobic bottle: Gram positive cocci in pairs  Final Report (12-05-22 @ 08:27):    Growth in aerobic bottle: Streptococcus constellatus  Organism: Streptococcus constellatus  Streptococcus constellatus (12-05-22 @ 08:27)  Organism: Streptococcus constellatus (12-05-22 @ 08:27)      -  Ceftriaxone: S 0.25      -  Penicillin: S 0.047      Method Type: ETEST  Organism: Streptococcus constellatus (12-05-22 @ 08:27)      -  Clindamycin: S      -  Erythromycin: S      -  Levofloxacin: S      -  Vancomycin: S      Method Type: KB    Culture - Blood (collected 11-29-22 @ 18:20)  Source: .Blood None  Gram Stain (12-01-22 @ 11:51):    Growth in anaerobic bottle: Gram positive cocci in pairs  Final Report (12-03-22 @ 08:31):    Growth in anaerobic bottle: Streptococcus constellatus "Susceptibilities    not performed"    ***Blood Panel PCR results on this specimen are available    approximately 3 hours after the Gram stain result.***    Gram stain, PCR, and/or culture results may not always    correspond due to difference in methodologies.    ************************************************************    This PCR assay was performed by multiplex PCR. This    Assay tests for 66 bacterial and resistance gene targets.    Please refer to the [facility] test directory    at [email or web address] for details.  Organism: Blood Culture PCR (12-02-22 @ 10:57)  Organism: Blood Culture PCR (12-02-22 @ 10:57)      -  Streptococcus sp. (Not Grp A, B or S pneumoniae): Detec      Method Type: PCR      C-Reactive Protein, Serum: <3 mg/L (11-29-22 @ 18:20)        < from: Xray Foot AP + Lateral + Oblique, Right (11.29.22 @ 18:35) >  Right foot. 3 views.    Orthopedic hardware in the lower right fibula noted for now healed   fracture with good alignment.    There is erosion of the soft tissues of the distal great toe. There may   be underlying destruction of the distal bone. An MR has been scheduled.    There is severe flexion of the DIP joint of the second toe obscuring   detail.    There may be destruction of this toe as well.    IMPRESSION: Positive right foot findings. MR scheduled. Chest   unremarkable.    < end of copied text >        < from: DONNIE Complete w/Spect and Color (12.05.22 @ 10:45) >     Impression     Summary     The left ventricle is normal in size, wall motion and contractility.   Estimated left ventricular ejection fraction is 60%.   There is a small calcified lesion on the ventricular surface of the   anterior leaflet of the mitral valve.   MIld aortic sclerosis.   No evidence of endocarditis.    < end of copied text >              Radiology: all available radiological tests reviewed    Advanced directives addressed: full resuscitation

## 2022-12-06 NOTE — DISCHARGE NOTE PROVIDER - PROVIDER TOKENS
PROVIDER:[TOKEN:[[id number]],FOLLOWUP:[1 week]],PROVIDER:[TOKEN:[[id number]],FOLLOWUP:[2 weeks]]

## 2022-12-06 NOTE — DISCHARGE NOTE PROVIDER - NSDCCPCAREPLAN_GEN_ALL_CORE_FT
PRINCIPAL DISCHARGE DIAGNOSIS  Diagnosis: Gangrene  Assessment and Plan of Treatment: s/p amputation antibiotics until 1/11      SECONDARY DISCHARGE DIAGNOSES  Diagnosis: Cellulitis  Assessment and Plan of Treatment:

## 2022-12-08 LAB
CULTURE RESULTS: SIGNIFICANT CHANGE UP
CULTURE RESULTS: SIGNIFICANT CHANGE UP
SPECIMEN SOURCE: SIGNIFICANT CHANGE UP
SPECIMEN SOURCE: SIGNIFICANT CHANGE UP

## 2022-12-12 NOTE — CDI QUERY NOTE - NSCDIOTHERTXTBX_GEN_ALL_CORE_HH
Please clarify if Gas Gangrene ruled in or ruled out?  A) Gas gangrene ruled in and present on admission ( Please document clinical indicators)  B) Gas gangrene ruled out  C) Unable to determine  D) Other ( Please specify)     CLINICAL INDICATORS:    Podiatry documentation on 11/30/2022:  CC/HPI: 66 y/o male PMHx HTN and HLD presents to ED sent in by podiatry for surgery on right first toe. Pt states he has infection and toe needs to be amputated. Sent in by Dr. Coronado for infection of right toe and possible amputation. Pt reports several weeks of redness in area. X-ray in podiatry office today showing gas gangrene    Brief op report on 11/30/2022:  Pre-Op Diagnosis, Post-Op Diagnosis and Procedure:    Pre-Op, Post-Op and Procedure Selector:  ·  PRE-OP DIAGNOSIS:  Gas gangrene of foot 30-Nov-2022 17:28:28 Right Anish Ragsdale.  ·  POST-OP DIAGNOSIS:  S/P amputation of foot, right 30-Nov-2022 17:28:40  Anish Ragsdale.  ·  PROCEDURES:  Amputation of right great toe 30-Nov-2022 17:28:17  Anish Ragsdale.   Operative Findings:  · Operative Findings	Right foot amputation secondary to gas gangene      Vascular consult documentation on 11/30/2022:  Mr. Stout is a 67 year old gentleman who presented to the ED with right great toe gas gangrene, now POD0 from right great toe amputation      Discharge summary documentation 12/6/2022:  #Right 1st Toe infection / ulcer / cellulitis / osteomyelitis with strep bacteremia:  - MRI noted for OM  - podiatry consult appreciated - POD toe amputation  - wound care per podiatry   - c/w zosyn  - abscess cx morganella, alpha strep  - OR Cx staph   - blood cultures streptococcus  -dressing changes per podiatry    Antibiotics:  ertapenem  IVPB 1000 milliGRAM(s) IV Intermittent every 24 hours  piperacillin/tazobactam IVPB. 3.375 Gram(s) IV Intermittent once  piperacillin/tazobactam IVPB. 3.375 Gram(s) IV Intermittent once  piperacillin/tazobactam IVPB.- 3.375 Gram(s) IV Intermittent once  piperacillin/tazobactam IVPB.- 3.375 Gram(s) IV Intermittent once  piperacillin/tazobactam IVPB.. 3.375 Gram(s) IV Intermittent every 8 hours  piperacillin/tazobactam IVPB.. 3.375 Gram(s) IV Intermittent once  vancomycin  IVPB 1250 milliGRAM(s) IV Intermittent once  vancomycin  IVPB 1250 milliGRAM(s) IV Intermittent every 12 hours

## 2022-12-13 ENCOUNTER — APPOINTMENT (OUTPATIENT)
Dept: DERMATOLOGY | Facility: CLINIC | Age: 67
End: 2022-12-13

## 2023-01-30 ENCOUNTER — OUTPATIENT (OUTPATIENT)
Dept: OUTPATIENT SERVICES | Facility: HOSPITAL | Age: 68
LOS: 1 days | End: 2023-01-30
Payer: COMMERCIAL

## 2023-01-30 ENCOUNTER — APPOINTMENT (OUTPATIENT)
Dept: MRI IMAGING | Facility: CLINIC | Age: 68
End: 2023-01-30
Payer: COMMERCIAL

## 2023-01-30 DIAGNOSIS — Z90.89 ACQUIRED ABSENCE OF OTHER ORGANS: Chronic | ICD-10-CM

## 2023-01-30 DIAGNOSIS — Z98.890 OTHER SPECIFIED POSTPROCEDURAL STATES: Chronic | ICD-10-CM

## 2023-01-30 DIAGNOSIS — Z90.49 ACQUIRED ABSENCE OF OTHER SPECIFIED PARTS OF DIGESTIVE TRACT: Chronic | ICD-10-CM

## 2023-01-30 DIAGNOSIS — Z00.8 ENCOUNTER FOR OTHER GENERAL EXAMINATION: ICD-10-CM

## 2023-01-30 PROCEDURE — 73718 MRI LOWER EXTREMITY W/O DYE: CPT | Mod: 26,RT

## 2023-01-30 PROCEDURE — 73718 MRI LOWER EXTREMITY W/O DYE: CPT

## 2023-02-09 RX ORDER — SODIUM CHLORIDE 9 MG/ML
1000 INJECTION, SOLUTION INTRAVENOUS
Refills: 0 | Status: DISCONTINUED | OUTPATIENT
Start: 2023-02-10 | End: 2023-02-10

## 2023-02-09 RX ORDER — OXYCODONE HYDROCHLORIDE 5 MG/1
5 TABLET ORAL ONCE
Refills: 0 | Status: DISCONTINUED | OUTPATIENT
Start: 2023-02-10 | End: 2023-02-10

## 2023-02-09 RX ORDER — FENTANYL CITRATE 50 UG/ML
50 INJECTION INTRAVENOUS
Refills: 0 | Status: DISCONTINUED | OUTPATIENT
Start: 2023-02-10 | End: 2023-02-10

## 2023-02-09 RX ORDER — ONDANSETRON 8 MG/1
4 TABLET, FILM COATED ORAL ONCE
Refills: 0 | Status: DISCONTINUED | OUTPATIENT
Start: 2023-02-10 | End: 2023-02-10

## 2023-02-10 ENCOUNTER — TRANSCRIPTION ENCOUNTER (OUTPATIENT)
Age: 68
End: 2023-02-10

## 2023-02-10 ENCOUNTER — OUTPATIENT (OUTPATIENT)
Dept: INPATIENT UNIT | Facility: HOSPITAL | Age: 68
LOS: 1 days | Discharge: HOME | End: 2023-02-10
Payer: COMMERCIAL

## 2023-02-10 VITALS
OXYGEN SATURATION: 95 % | HEART RATE: 76 BPM | HEIGHT: 75 IN | TEMPERATURE: 98 F | WEIGHT: 199.96 LBS | RESPIRATION RATE: 16 BRPM | DIASTOLIC BLOOD PRESSURE: 73 MMHG | SYSTOLIC BLOOD PRESSURE: 153 MMHG

## 2023-02-10 VITALS
DIASTOLIC BLOOD PRESSURE: 73 MMHG | OXYGEN SATURATION: 96 % | RESPIRATION RATE: 16 BRPM | TEMPERATURE: 98 F | SYSTOLIC BLOOD PRESSURE: 119 MMHG | HEART RATE: 56 BPM

## 2023-02-10 DIAGNOSIS — L02.611 CUTANEOUS ABSCESS OF RIGHT FOOT: ICD-10-CM

## 2023-02-10 DIAGNOSIS — Z90.49 ACQUIRED ABSENCE OF OTHER SPECIFIED PARTS OF DIGESTIVE TRACT: Chronic | ICD-10-CM

## 2023-02-10 DIAGNOSIS — Z98.890 OTHER SPECIFIED POSTPROCEDURAL STATES: Chronic | ICD-10-CM

## 2023-02-10 DIAGNOSIS — Z90.89 ACQUIRED ABSENCE OF OTHER ORGANS: Chronic | ICD-10-CM

## 2023-02-10 DIAGNOSIS — M86.171 OTHER ACUTE OSTEOMYELITIS, RIGHT ANKLE AND FOOT: ICD-10-CM

## 2023-02-10 PROCEDURE — 73630 X-RAY EXAM OF FOOT: CPT | Mod: RT

## 2023-02-10 PROCEDURE — 88305 TISSUE EXAM BY PATHOLOGIST: CPT

## 2023-02-10 PROCEDURE — 73630 X-RAY EXAM OF FOOT: CPT | Mod: 26,RT

## 2023-02-10 PROCEDURE — 88311 DECALCIFY TISSUE: CPT

## 2023-02-10 PROCEDURE — 88305 TISSUE EXAM BY PATHOLOGIST: CPT | Mod: 26

## 2023-02-10 PROCEDURE — 88311 DECALCIFY TISSUE: CPT | Mod: 26

## 2023-02-10 RX ORDER — METOPROLOL TARTRATE 50 MG
1 TABLET ORAL
Qty: 0 | Refills: 0 | DISCHARGE

## 2023-02-10 RX ORDER — ASPIRIN/CALCIUM CARB/MAGNESIUM 324 MG
1 TABLET ORAL
Qty: 0 | Refills: 0 | DISCHARGE

## 2023-02-10 RX ORDER — ATORVASTATIN CALCIUM 80 MG/1
1 TABLET, FILM COATED ORAL
Qty: 0 | Refills: 0 | DISCHARGE

## 2023-02-10 NOTE — ASU DISCHARGE PLAN (ADULT/PEDIATRIC) - ASU DC SPECIAL INSTRUCTIONSFT
Please elevate the extremity   Use Walking boot to assist with ambulation   RICE  ICE over splint if needed   Take pain medications as prescribed   Follow up with your surgeon as scheduled   Call your doctor if you have fever or pain that is not relieved by pain medications

## 2023-02-10 NOTE — BRIEF OPERATIVE NOTE - NSICDXBRIEFPROCEDURE_GEN_ALL_CORE_FT
PROCEDURES:  Amputation of toe of right foot 10-Feb-2023 13:34:04  Reggie Tanner  Biopsy, bone, toe, open 10-Feb-2023 13:34:19  Reggie Tanner

## 2023-02-10 NOTE — ASU DISCHARGE PLAN (ADULT/PEDIATRIC) - CARE PROVIDER_API CALL
BRAD PORTER  Surgery  [address] [place] [address], [place] [address]  Phone: [phone]  Fax: ([phone]  Follow Up Time:

## 2023-02-17 DIAGNOSIS — Z90.49 ACQUIRED ABSENCE OF OTHER SPECIFIED PARTS OF DIGESTIVE TRACT: ICD-10-CM

## 2023-02-17 DIAGNOSIS — M86.171 OTHER ACUTE OSTEOMYELITIS, RIGHT ANKLE AND FOOT: ICD-10-CM

## 2023-02-17 DIAGNOSIS — L97.516 NON-PRESSURE CHRONIC ULCER OF OTHER PART OF RIGHT FOOT WITH BONE INVOLVEMENT WITHOUT EVIDENCE OF NECROSIS: ICD-10-CM

## 2023-02-17 DIAGNOSIS — Z85.038 PERSONAL HISTORY OF OTHER MALIGNANT NEOPLASM OF LARGE INTESTINE: ICD-10-CM

## 2023-02-17 DIAGNOSIS — I10 ESSENTIAL (PRIMARY) HYPERTENSION: ICD-10-CM

## 2023-02-17 DIAGNOSIS — E78.5 HYPERLIPIDEMIA, UNSPECIFIED: ICD-10-CM

## 2023-02-17 DIAGNOSIS — M41.9 SCOLIOSIS, UNSPECIFIED: ICD-10-CM

## 2023-07-01 PROBLEM — M54.9 DORSALGIA, UNSPECIFIED: Chronic | Status: ACTIVE | Noted: 2023-02-09

## 2023-07-01 PROBLEM — Z87.39 PERSONAL HISTORY OF OTHER DISEASES OF THE MUSCULOSKELETAL SYSTEM AND CONNECTIVE TISSUE: Chronic | Status: ACTIVE | Noted: 2023-02-09

## 2023-07-03 ENCOUNTER — APPOINTMENT (OUTPATIENT)
Dept: DERMATOLOGY | Facility: CLINIC | Age: 68
End: 2023-07-03
Payer: COMMERCIAL

## 2023-07-03 DIAGNOSIS — C44.519 BASAL CELL CARCINOMA OF SKIN OF OTHER PART OF TRUNK: ICD-10-CM

## 2023-07-03 DIAGNOSIS — D48.5 NEOPLASM OF UNCERTAIN BEHAVIOR OF SKIN: ICD-10-CM

## 2023-07-03 PROCEDURE — 11102 TANGNTL BX SKIN SINGLE LES: CPT | Mod: 59

## 2023-07-03 PROCEDURE — 17262 DSTRJ MAL LES T/A/L 1.1-2.0: CPT

## 2023-07-03 RX ORDER — ERTAPENEM SODIUM 1 G/1
1 INJECTION, POWDER, LYOPHILIZED, FOR SOLUTION INTRAMUSCULAR; INTRAVENOUS
Qty: 8 | Refills: 0 | Status: ACTIVE | COMMUNITY
Start: 2023-01-03

## 2023-07-03 RX ORDER — OXYCODONE AND ACETAMINOPHEN 10; 325 MG/1; MG/1
10-325 TABLET ORAL
Qty: 20 | Refills: 0 | Status: ACTIVE | COMMUNITY
Start: 2023-02-07

## 2023-07-03 RX ORDER — SULFAMETHOXAZOLE AND TRIMETHOPRIM 800; 160 MG/1; MG/1
800-160 TABLET ORAL
Qty: 20 | Refills: 0 | Status: ACTIVE | COMMUNITY
Start: 2023-01-24

## 2023-07-03 NOTE — H&P ADULT - TIME-BASED
69 Cimzia Pregnancy And Lactation Text: This medication crosses the placenta but can be considered safe in certain situations. Cimzia may be excreted in breast milk.

## 2023-07-03 NOTE — ASSESSMENT
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

## 2023-07-10 LAB — CORE LAB BIOPSY: NORMAL

## 2023-07-27 ENCOUNTER — APPOINTMENT (OUTPATIENT)
Dept: DERMATOLOGY | Facility: CLINIC | Age: 68
End: 2023-07-27
Payer: COMMERCIAL

## 2023-07-27 DIAGNOSIS — C44.519 BASAL CELL CARCINOMA OF SKIN OF OTHER PART OF TRUNK: ICD-10-CM

## 2023-07-27 PROCEDURE — 17262 DSTRJ MAL LES T/A/L 1.1-2.0: CPT

## 2023-08-17 ENCOUNTER — APPOINTMENT (OUTPATIENT)
Dept: DERMATOLOGY | Facility: CLINIC | Age: 68
End: 2023-08-17
Payer: COMMERCIAL

## 2023-08-17 DIAGNOSIS — C44.719 BASAL CELL CARCINOMA OF SKIN OF LEFT LOWER LIMB, INCLUDING HIP: ICD-10-CM

## 2023-08-17 PROCEDURE — 11604 EXC TR-EXT MAL+MARG 3.1-4 CM: CPT

## 2023-08-17 PROCEDURE — 13121 CMPLX RPR S/A/L 2.6-7.5 CM: CPT

## 2023-08-17 NOTE — PROCEDURE
[___ cm] : [unfilled] cm [___ mm] : [unfilled] mm [___ ml of 1% lidocaine w/ 1:100,000 epinephrine] : [unfilled] ml of 1% lidocaine with 1:100,000 epinephrine [Electrodessication] : electrodessication [____ cm] : [unfilled] cm [____ days] : [unfilled] days [FreeTextEntry7] : left shin [FreeTextEntry2] : 3-0 vicryl x 5. [FreeTextEntry3] : 3-0 ethilon x 9 interrupted. [TWNoteComboBox3] : Subcutaneous fat [TWNoteComboBox5] : Subcutaneous fat

## 2023-08-24 LAB — CORE LAB BIOPSY: NORMAL

## 2023-08-29 NOTE — END OF VISIT
[Time Spent: ___ minutes] : I have spent [unfilled] minutes of time on the encounter. [>50% of the face to face encounter time was spent on counseling and/or coordination of care for ___] : Greater than 50% of the face to face encounter time was spent on counseling and/or coordination of care for [unfilled] Muscle Hinge Flap Text: The defect edges were debeveled with a #15 scalpel blade.  Given the size, depth and location of the defect and the proximity to free margins a muscle hinge flap was deemed most appropriate.  Using a sterile surgical marker, an appropriate hinge flap was drawn incorporating the defect. The area thus outlined was incised with a #15 scalpel blade.  The skin margins were undermined to an appropriate distance in all directions utilizing iris scissors.

## 2023-08-31 ENCOUNTER — APPOINTMENT (OUTPATIENT)
Dept: DERMATOLOGY | Facility: CLINIC | Age: 68
End: 2023-08-31
Payer: COMMERCIAL

## 2023-08-31 DIAGNOSIS — C44.729 SQUAMOUS CELL CARCINOMA OF SKIN OF LEFT LOWER LIMB, INCLUDING HIP: ICD-10-CM

## 2023-08-31 PROCEDURE — 99024 POSTOP FOLLOW-UP VISIT: CPT

## 2023-08-31 NOTE — HISTORY OF PRESENT ILLNESS
[FreeTextEntry1] : Suture removal. [de-identified] : Left shin, well healed, with some maceration. No evidence of infection. Sutures removed. Path with clear margins. Continued wound care discussed. f/u in 5 months for TBSE.

## 2024-01-08 NOTE — DISCHARGE NOTE PROVIDER - NSDCCAREPROVSEEN_GEN_ALL_CORE_FT
01/08/24 1:38 PM        The office's request has been received, reviewed, and the patient chart updated. The PCP has successfully been removed with a patient attribution note. This message will now be completed.        Thank you  Chiki Maxwell   [place], Kendy Lemos

## 2024-01-30 ENCOUNTER — APPOINTMENT (OUTPATIENT)
Dept: DERMATOLOGY | Facility: CLINIC | Age: 69
End: 2024-01-30
Payer: COMMERCIAL

## 2024-01-30 DIAGNOSIS — L57.0 ACTINIC KERATOSIS: ICD-10-CM

## 2024-01-30 DIAGNOSIS — L82.1 OTHER SEBORRHEIC KERATOSIS: ICD-10-CM

## 2024-01-30 DIAGNOSIS — L90.5 SCAR CONDITIONS AND FIBROSIS OF SKIN: ICD-10-CM

## 2024-01-30 DIAGNOSIS — D22.9 MELANOCYTIC NEVI, UNSPECIFIED: ICD-10-CM

## 2024-01-30 DIAGNOSIS — L81.4 OTHER MELANIN HYPERPIGMENTATION: ICD-10-CM

## 2024-01-30 DIAGNOSIS — L40.0 PSORIASIS VULGARIS: ICD-10-CM

## 2024-01-30 PROCEDURE — 17000 DESTRUCT PREMALG LESION: CPT

## 2024-01-30 PROCEDURE — 99214 OFFICE O/P EST MOD 30 MIN: CPT | Mod: 25

## 2024-01-30 PROCEDURE — 17003 DESTRUCT PREMALG LES 2-14: CPT

## 2024-01-30 RX ORDER — FLUTICASONE PROPIONATE 0.05 MG/G
0.01 OINTMENT TOPICAL TWICE DAILY
Qty: 1 | Refills: 1 | Status: ACTIVE | COMMUNITY
Start: 2024-01-30 | End: 1900-01-01

## 2024-01-30 RX ORDER — HYDROCORTISONE 25 MG/G
2.5 CREAM TOPICAL TWICE DAILY
Qty: 1 | Refills: 1 | Status: ACTIVE | COMMUNITY
Start: 2024-01-30 | End: 1900-01-01

## 2024-01-30 NOTE — ASSESSMENT
[FreeTextEntry1] : A complete skin examination was performed.  There is no evidence of skin cancer.  We discussed the importance of photoprotection, including the use of hats, protective clothing and sunscreens with an SPF of at least 30.  Sun avoidance was also discussed.  The ABCDE's of melanoma was discussed.  Regular skin exams recommended.  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 Follow.  Psoriasis Discussed at length. Cutivate cream bid for LE's. Hytone cream prn for face. DHS-Zinc shampoo for scalp. Will follow.

## 2024-01-30 NOTE — HISTORY OF PRESENT ILLNESS
[FreeTextEntry1] : Patient presents for skin examination. [de-identified] : Rash of the right leg, progressing over the past couple of months.  No pruritus, but the eruption is irritated.

## 2024-01-30 NOTE — PHYSICAL EXAM
[Alert] : alert [Oriented x 3] : ~L oriented x 3 [Well Nourished] : well nourished [Full Body Skin Exam Performed] : performed [FreeTextEntry3] : A full skin exam was performed including the scalp, face (including lips, ears, nose and eyes), neck, chest, abdomen, back, buttocks, upper extremities and lower extremities.  The patient declined examination of the genitalia.   The exam revealed the following benign growths: [place] pigmented nevi. Seborrheic keratoses. Lentigines.  keratotic papules on the left dorsal hand x 2.  Firm linear plaques, left chest and left upper arm.  Silvery scaling patches of the scalp. Erythematous scaling macules, face. Large erythematous scaling patches of the right shin, moderate on the left shin.   Trace xerosis, left elbow.

## 2024-02-14 NOTE — PATIENT PROFILE ADULT - ARRIVAL FROM
Gargle with warm salt water. This helps reduce swelling and relieve discomfort. Gargle once an hour with 1 teaspoon of salt mixed in 8 fluid ounces of warm water. Increase fluid intake. Start Saline nasal spray & sinus rinses. Take an over-the-counter pain medicine, such as acetaminophen (Tylenol), ibuprofen (Advil, Motrin),  to reduce fever and relieve body aches. Read and follow all instructions on the label.Use a humidifier in your room. Start OTC non drowsy antihistamine.   Elevated Blood Pressure: Care Instructions  Your Care Instructions    Blood pressure is a measure of how hard the blood pushes against the walls of your arteries. It's normal for blood pressure to go up and down throughout the day. But if it stays up over time, you have high blood pressure.  Two numbers tell you your blood pressure. The first number is the systolic pressure. It shows how hard the blood pushes when your heart is pumping. The second number is the diastolic pressure. It shows how hard the blood pushes between heartbeats, when your heart is relaxed and filling with blood. An ideal blood pressure in adults is less than 120/80 (say \"120 over 80\"). High blood pressure is 140/90 or higher. You have high blood pressure if your top number is 140 or higher or your bottom number is 90 or higher, or both.  The main test for high blood pressure is simple, fast, and painless. To diagnose high blood pressure, your doctor will test your blood pressure at different times. After testing your blood pressure, your doctor may ask you to test it again when you are home.  If you are diagnosed with high blood pressure, you can work with your doctor to make a long-term plan to manage it.  Follow-up care is a key part of your treatment and safety. Be sure to make and go to all appointments, and call your doctor if you are having problems. It's also a good idea to know your test results and keep a list of the medicines you take.  How can you care for 
Home

## 2024-06-20 ENCOUNTER — OUTPATIENT (OUTPATIENT)
Dept: OUTPATIENT SERVICES | Facility: HOSPITAL | Age: 69
LOS: 1 days | End: 2024-06-20
Payer: COMMERCIAL

## 2024-06-20 ENCOUNTER — APPOINTMENT (OUTPATIENT)
Dept: RADIOLOGY | Facility: CLINIC | Age: 69
End: 2024-06-20
Payer: COMMERCIAL

## 2024-06-20 ENCOUNTER — APPOINTMENT (OUTPATIENT)
Dept: NEUROSURGERY | Facility: CLINIC | Age: 69
End: 2024-06-20
Payer: COMMERCIAL

## 2024-06-20 VITALS
HEIGHT: 75 IN | SYSTOLIC BLOOD PRESSURE: 142 MMHG | WEIGHT: 197 LBS | BODY MASS INDEX: 24.49 KG/M2 | HEART RATE: 90 BPM | DIASTOLIC BLOOD PRESSURE: 76 MMHG | OXYGEN SATURATION: 94 %

## 2024-06-20 DIAGNOSIS — M41.50 OTHER SECONDARY SCOLIOSIS, SITE UNSPECIFIED: ICD-10-CM

## 2024-06-20 DIAGNOSIS — Z98.890 OTHER SPECIFIED POSTPROCEDURAL STATES: Chronic | ICD-10-CM

## 2024-06-20 DIAGNOSIS — M54.50 LOW BACK PAIN, UNSPECIFIED: ICD-10-CM

## 2024-06-20 DIAGNOSIS — R29.898 OTHER SYMPTOMS AND SIGNS INVOLVING THE MUSCULOSKELETAL SYSTEM: ICD-10-CM

## 2024-06-20 DIAGNOSIS — Z90.49 ACQUIRED ABSENCE OF OTHER SPECIFIED PARTS OF DIGESTIVE TRACT: Chronic | ICD-10-CM

## 2024-06-20 DIAGNOSIS — Z90.89 ACQUIRED ABSENCE OF OTHER ORGANS: Chronic | ICD-10-CM

## 2024-06-20 DIAGNOSIS — G62.9 POLYNEUROPATHY, UNSPECIFIED: ICD-10-CM

## 2024-06-20 PROCEDURE — 99204 OFFICE O/P NEW MOD 45 MIN: CPT

## 2024-06-20 PROCEDURE — 72082 X-RAY EXAM ENTIRE SPI 2/3 VW: CPT | Mod: 26

## 2024-06-20 PROCEDURE — 72082 X-RAY EXAM ENTIRE SPI 2/3 VW: CPT

## 2024-06-20 RX ORDER — ALPRAZOLAM 0.5 MG/1
0.5 TABLET ORAL
Qty: 2 | Refills: 0 | Status: ACTIVE | COMMUNITY
Start: 2024-06-20 | End: 1900-01-01

## 2024-06-25 PROBLEM — M54.50 LUMBAGO: Status: ACTIVE | Noted: 2020-06-23

## 2024-06-25 PROBLEM — R29.898 LEG WEAKNESS: Status: ACTIVE | Noted: 2020-06-23

## 2024-06-25 PROBLEM — M41.50 DEGENERATIVE SCOLIOSIS: Status: ACTIVE | Noted: 2024-06-20

## 2024-06-25 NOTE — CONSULT LETTER
[Dear  ___] : Dear  [unfilled], [Courtesy Letter:] : I had the pleasure of seeing your patient, [unfilled], in my office today. [Sincerely,] : Sincerely, [FreeTextEntry2] : Canelo Pickard  [place] [address]  [FreeTextEntry1] : Mr. Stout is a very pleasant 69-year-old male patient who was seen in our office regarding right-sided low back pain and bilateral leg symptoms.  The patient endorses recent onset of severe back pain dating back a week and a half after lifting plantars.  The patient endorses right-sided low back pain with left-sided leg weakness.  The patient endorses bilateral leg numbness which is longstanding.  The patient was previously seen in our office in 2020 regarding his scoliotic deformity and the patient was recommended EMG/nerve conduction studies for his presumed peripheral neuropathy.  The patient subsequently obtained EMG/nerve conduction studies which confirmed a peripheral neuropathy but the patient was subsequently lost to follow-up.  Since then, the patient has had 2 toes amputated and believes this was a result of his gait disturbances secondary to his scoliosis.  The patient endorses no allergies to medications.  The patient endorses a peripheral neuropathy as part of his medical history.  The patient's current medical regimen includes atorvastatin and metoprolol.  On examination, the patient remains alert, oriented, and compliant with the exam.  The patient continues to demonstrate a very positive sagittal balance and thoracolumbar kyphosis.  The patient demonstrates full strength of the lower extremities except with dorsiflexion on the left which is rated at a 4/5.  The patient has muted reflexes at the patellar tendons rated at 3+ and I am unable to obtain Achilles tendon reflexes at today's visit.  I have no new imaging to review today.  The patient's previous images demonstrated multilevel lumbar stenosis and a rotatory kyphoscoliosis.  These images were performed in 2020.  The patient also had scoliosis views which demonstrated a good coronal balance but a very positive sagittal balance measuring 20 cm.  The patient's thoracic spine actually demonstrates a mild lordosis.  Taken together, the patient has a clinical history and radiographic findings most consistent with an acute on chronic episode back pain most likely secondary to his significant kyphoscoliotic deformity.  However, I cannot rule out any new structural lesions or fractures and thus I have recommended updated imaging of the thoracolumbar spine.  The patient's persistent lower extremity symptoms and weakness are worrisome for progressive peripheral neuropathy.  The patient has not been evaluated for the underlying cause of his peripheral neuropathy and thus he has been referred to neurology for further evaluation in this regard.  The patient will be returning to our office once his imaging is complete so that we can review his surgical and nonsurgical options regarding his kyphoscoliotic deformity. [FreeTextEntry3] : Juan Simons MD, PhD, FRCPSC  Attending Neurosurgeon  [address], [address] [place] [address]  Office: [phone]  Fax: [phone]

## 2024-07-03 ENCOUNTER — APPOINTMENT (OUTPATIENT)
Dept: MRI IMAGING | Facility: CLINIC | Age: 69
End: 2024-07-03
Payer: COMMERCIAL

## 2024-07-03 ENCOUNTER — OUTPATIENT (OUTPATIENT)
Dept: OUTPATIENT SERVICES | Facility: HOSPITAL | Age: 69
LOS: 1 days | End: 2024-07-03
Payer: COMMERCIAL

## 2024-07-03 DIAGNOSIS — Z98.890 OTHER SPECIFIED POSTPROCEDURAL STATES: Chronic | ICD-10-CM

## 2024-07-03 DIAGNOSIS — Z90.89 ACQUIRED ABSENCE OF OTHER ORGANS: Chronic | ICD-10-CM

## 2024-07-03 DIAGNOSIS — M41.50 OTHER SECONDARY SCOLIOSIS, SITE UNSPECIFIED: ICD-10-CM

## 2024-07-03 DIAGNOSIS — Z90.49 ACQUIRED ABSENCE OF OTHER SPECIFIED PARTS OF DIGESTIVE TRACT: Chronic | ICD-10-CM

## 2024-07-03 PROCEDURE — 72148 MRI LUMBAR SPINE W/O DYE: CPT

## 2024-07-03 PROCEDURE — 72148 MRI LUMBAR SPINE W/O DYE: CPT | Mod: 26

## 2024-07-22 ENCOUNTER — APPOINTMENT (OUTPATIENT)
Dept: NEUROSURGERY | Facility: CLINIC | Age: 69
End: 2024-07-22
Payer: COMMERCIAL

## 2024-07-22 DIAGNOSIS — Z86.59 PERSONAL HISTORY OF OTHER MENTAL AND BEHAVIORAL DISORDERS: ICD-10-CM

## 2024-07-22 DIAGNOSIS — R29.898 OTHER SYMPTOMS AND SIGNS INVOLVING THE MUSCULOSKELETAL SYSTEM: ICD-10-CM

## 2024-07-22 PROCEDURE — 99215 OFFICE O/P EST HI 40 MIN: CPT

## 2024-07-22 RX ORDER — LORAZEPAM 0.5 MG/1
0.5 TABLET ORAL
Qty: 1 | Refills: 0 | Status: ACTIVE | COMMUNITY
Start: 2024-07-22 | End: 1900-01-01

## 2024-07-23 ENCOUNTER — APPOINTMENT (OUTPATIENT)
Dept: NEUROSURGERY | Facility: CLINIC | Age: 69
End: 2024-07-23

## 2024-07-23 NOTE — BRIEF OPERATIVE NOTE - CONDITION POST OP
Mom asking if form can please be faxed to her at FAX: [phone]?    Message confirmed with caller.   Routed to providers clinical pool.     Stable

## 2024-07-31 ENCOUNTER — OUTPATIENT (OUTPATIENT)
Dept: OUTPATIENT SERVICES | Facility: HOSPITAL | Age: 69
LOS: 1 days | End: 2024-07-31
Payer: COMMERCIAL

## 2024-07-31 ENCOUNTER — APPOINTMENT (OUTPATIENT)
Dept: MRI IMAGING | Facility: CLINIC | Age: 69
End: 2024-07-31
Payer: COMMERCIAL

## 2024-07-31 DIAGNOSIS — Z98.890 OTHER SPECIFIED POSTPROCEDURAL STATES: Chronic | ICD-10-CM

## 2024-07-31 DIAGNOSIS — Z90.49 ACQUIRED ABSENCE OF OTHER SPECIFIED PARTS OF DIGESTIVE TRACT: Chronic | ICD-10-CM

## 2024-07-31 DIAGNOSIS — R29.898 OTHER SYMPTOMS AND SIGNS INVOLVING THE MUSCULOSKELETAL SYSTEM: ICD-10-CM

## 2024-07-31 DIAGNOSIS — Z90.89 ACQUIRED ABSENCE OF OTHER ORGANS: Chronic | ICD-10-CM

## 2024-07-31 PROCEDURE — 72146 MRI CHEST SPINE W/O DYE: CPT | Mod: 26

## 2024-07-31 PROCEDURE — 72146 MRI CHEST SPINE W/O DYE: CPT

## 2024-08-07 ENCOUNTER — NON-APPOINTMENT (OUTPATIENT)
Age: 69
End: 2024-08-07

## 2024-08-14 ENCOUNTER — APPOINTMENT (OUTPATIENT)
Dept: NEUROLOGY | Facility: CLINIC | Age: 69
End: 2024-08-14
Payer: COMMERCIAL

## 2024-08-14 VITALS
DIASTOLIC BLOOD PRESSURE: 78 MMHG | BODY MASS INDEX: 24.25 KG/M2 | TEMPERATURE: 98.2 F | SYSTOLIC BLOOD PRESSURE: 122 MMHG | HEART RATE: 79 BPM | WEIGHT: 195 LBS | HEIGHT: 75 IN

## 2024-08-14 DIAGNOSIS — Z83.3 FAMILY HISTORY OF DIABETES MELLITUS: ICD-10-CM

## 2024-08-14 DIAGNOSIS — R26.81 UNSTEADINESS ON FEET: ICD-10-CM

## 2024-08-14 DIAGNOSIS — Z78.9 OTHER SPECIFIED HEALTH STATUS: ICD-10-CM

## 2024-08-14 PROCEDURE — 99204 OFFICE O/P NEW MOD 45 MIN: CPT

## 2024-08-15 RX ORDER — CYANOCOBALAMIN 1000 UG/ML
1000 INJECTION INTRAMUSCULAR; SUBCUTANEOUS
Qty: 1 | Refills: 3 | Status: ACTIVE | COMMUNITY
Start: 2024-08-15 | End: 1900-01-01

## 2024-08-16 ENCOUNTER — APPOINTMENT (OUTPATIENT)
Dept: NEUROLOGY | Facility: CLINIC | Age: 69
End: 2024-08-16
Payer: COMMERCIAL

## 2024-08-16 DIAGNOSIS — G62.9 POLYNEUROPATHY, UNSPECIFIED: ICD-10-CM

## 2024-08-16 PROCEDURE — 99212 OFFICE O/P EST SF 10 MIN: CPT | Mod: 25

## 2024-08-16 PROCEDURE — 96372 THER/PROPH/DIAG INJ SC/IM: CPT

## 2024-08-16 NOTE — DISCUSSION/SUMMARY
[FreeTextEntry1] : 69-year-old M with PMHx of  HTN, HLD, scoliosis and colorectal CA 24 years ago, s/p RT followed by surgical resection+ chemo and iliostomy reversal; presents for evaluation of worsening balance and gait, difficulty ambulating unassisted and weakness of both lower extremities. Gait imbalance started 4-5 years ago, with sporadic low back pain with lumbar radicular symptoms, over the past 1 year, has worsening balance, difficulty ascending stairs, feels wobbly, has intermittent numbness/tingling sensation/cramping in the feet, he feels he needs to drag his feet, He started using cane 8 months ago, in house uses walker. He underwent right big toe/ partial second toe amputations due to nonhealing ulcers 2 years ago.  # Given evolution over past 4-5 years, symptoms, nonhealing ulcers resulting in toe amputations as well as clinical exam; most likely suggestive of advanced sensorimotor neuropathy; exam/symptoms are not suggestive of myelopathy or thoracolumbar radiculopathy.  # B12  Deficiency  - Injection B12 1000 mcg IM first dose administered today.  Have recommended B12 q. weekly x 4 total then check level and decide on monthly parenteral supplementation/SL - NCV studies by Dr. Welch are conclusive for profound neuropathy, I reviewed the studies, most likely axonal etiology, however, the tables are manual - difficult to determine ?  mixed polyneuropathy, also NCV studies of upper extremities were not done.  -I had a detailed discussion with the patient and his wife, as he has an important upcoming family event soon, I have recommended that he start physical therapy with special reference to strengthening of proximal lower extremity muscles and dorsiflexors. - I have also recommended obtaining a quad cane- will give him more stability instead of single prong cane - I have also recommended follow-up EMG/NCV studies of upper and lower extremities in 10-12 weeks to see evolution and rule out demyelinating neuropathy that may warrant specific treatment protocol

## 2024-08-16 NOTE — REASON FOR VISIT
[Procedure: _________] : a [unfilled] procedure visit [FreeTextEntry1] : For administration of B12 injection

## 2024-08-16 NOTE — DATA REVIEWED
[de-identified] : 8/14/24. B12 level 221, Vitamin D 22.6, RF < 14, RPR NR, NIKHIL negative, SSA-B abs negative, SIFE/SPEP no M spike detected, A1c 5.7, TSH 1.09

## 2024-08-16 NOTE — HISTORY OF PRESENT ILLNESS
[FreeTextEntry1] : Patient is here for follow-up visit, was last seen on 8/14/2024.  I received labs from patient, B12 level was deficient to 21, rest of the labs RPR, RF, NIKHIL, SSA-B antibodies, SIFE/SPEP, A1c and TSH were normal.  Patient was advised to start B12 IM injections, he is here for the first injection today  He reports he has not yet started physical therapy

## 2024-08-19 ENCOUNTER — APPOINTMENT (OUTPATIENT)
Dept: DERMATOLOGY | Facility: CLINIC | Age: 69
End: 2024-08-19
Payer: COMMERCIAL

## 2024-08-19 DIAGNOSIS — L57.0 ACTINIC KERATOSIS: ICD-10-CM

## 2024-08-19 DIAGNOSIS — D22.9 MELANOCYTIC NEVI, UNSPECIFIED: ICD-10-CM

## 2024-08-19 DIAGNOSIS — L82.1 OTHER SEBORRHEIC KERATOSIS: ICD-10-CM

## 2024-08-19 DIAGNOSIS — L81.4 OTHER MELANIN HYPERPIGMENTATION: ICD-10-CM

## 2024-08-19 DIAGNOSIS — Z87.2 PERSONAL HISTORY OF DISEASES OF THE SKIN AND SUBCUTANEOUS TISSUE: ICD-10-CM

## 2024-08-19 DIAGNOSIS — L85.3 XEROSIS CUTIS: ICD-10-CM

## 2024-08-19 DIAGNOSIS — D48.5 NEOPLASM OF UNCERTAIN BEHAVIOR OF SKIN: ICD-10-CM

## 2024-08-19 PROCEDURE — 11103 TANGNTL BX SKIN EA SEP/ADDL: CPT | Mod: 59

## 2024-08-19 PROCEDURE — 11102 TANGNTL BX SKIN SINGLE LES: CPT

## 2024-08-19 PROCEDURE — 17003 DESTRUCT PREMALG LES 2-14: CPT

## 2024-08-19 PROCEDURE — 17000 DESTRUCT PREMALG LESION: CPT | Mod: 59

## 2024-08-19 PROCEDURE — 99213 OFFICE O/P EST LOW 20 MIN: CPT | Mod: 25

## 2024-08-19 NOTE — ASSESSMENT
[FreeTextEntry1] : A complete skin examination was performed.  We discussed the importance of photoprotection, including the use of hats, protective clothing and sunscreens with an SPF of at least 30.  Sun avoidance was also discussed.  The ABCDE's of melanoma was discussed with the patient.  Regular skin exams are encouraged.  Xerosis AmLactin lotion, especially for the LE's.  R/o BCC, right chest. Plan D&C if positive.  R/o SCC, right forearm. Plan small excision if positive.  R/o SCC, possible irritated cicatrix, left arm, A, B, C Plan excisions if positive.

## 2024-08-19 NOTE — HISTORY OF PRESENT ILLNESS
[FreeTextEntry1] : Patient presents for skin examination. [de-identified] : Note rough lesions, growing lesions on the left upper arm.

## 2024-08-19 NOTE — PHYSICAL EXAM
[Alert] : alert [Oriented x 3] : ~L oriented x 3 [Well Nourished] : well nourished [Full Body Skin Exam Performed] : performed [FreeTextEntry3] : A full skin exam was performed including the scalp, face (including lips, ears, nose and eyes), neck, chest, abdomen, back, buttocks, upper extremities and lower extremities.  The patient declined examination of the genitalia.   The exam revealed the following benign growths: [place] pigmented nevi. Seborrheic keratoses. Lentigines.  keratotic papules, vertex of scalp x  6 and the right upper arm.  Erythematous macule, right chest. Firm papule, hint crust, right forearm. Firm crusted papules, left upper arm, A,B,C from medial to lateral.

## 2024-08-20 ENCOUNTER — NON-APPOINTMENT (OUTPATIENT)
Age: 69
End: 2024-08-20

## 2024-08-21 ENCOUNTER — APPOINTMENT (OUTPATIENT)
Dept: NEUROLOGY | Facility: CLINIC | Age: 69
End: 2024-08-21
Payer: COMMERCIAL

## 2024-08-21 PROBLEM — E53.8 VITAMIN B12 DEFICIENCY: Status: ACTIVE | Noted: 2024-08-15

## 2024-08-21 PROCEDURE — 99212 OFFICE O/P EST SF 10 MIN: CPT | Mod: 25

## 2024-08-21 NOTE — DISCUSSION/SUMMARY
[FreeTextEntry1] : 69-year-old M with PMHx of  HTN, HLD, scoliosis and colorectal CA 24 years ago, s/p RT followed by surgical resection+ chemo and iliostomy reversal; presents for evaluation of worsening balance and gait, difficulty ambulating unassisted and weakness of both lower extremities. Gait imbalance started 4-5 years ago, with sporadic low back pain with lumbar radicular symptoms, over the past 1 year, has worsening balance, difficulty ascending stairs, feels wobbly, has intermittent numbness/tingling sensation/cramping in the feet, he feels he needs to drag his feet, He started using cane 8 months ago, in house uses walker. He underwent right big toe/ partial second toe amputations due to nonhealing ulcers 2 years ago.  # B12  Deficiency  - Injection B12 1000 mcg IM x 2, 2nd dose administered today.   - Have recommended B12 q. weekly x 4 total then check level and decide on SL vs monthly parenteral supplementation  # Given evolution over past 4-5 years, symptoms, nonhealing ulcers resulting in toe amputations as well as clinical exam; most likely suggestive of advanced sensorimotor neuropathy; exam/symptoms are not suggestive of myelopathy or thoracolumbar radiculopathy.  - NCV studies by Dr. Welch are conclusive for profound neuropathy, I reviewed the studies, most likely axonal etiology, however, the tables are manual - difficult to determine ?  mixed polyneuropathy, also NCV studies of upper extremities were not done. -I had a detailed discussion with the patient and his wife, as he has an important upcoming family event soon, I have recommended that he start physical therapy with special reference to strengthening of proximal lower extremity muscles and dorsiflexors. - I have also recommended obtaining a quad cane- will give him more stability instead of single prong cane - I have also recommended follow-up EMG/NCV studies of upper and lower extremities in 10-12 weeks to see evolution and rule out demyelinating neuropathy that may warrant specific treatment protocol

## 2024-08-21 NOTE — DATA REVIEWED
[de-identified] : 8/14/24. B12 level 221, Vitamin D 22.6, RF < 14, RPR NR, NIKHIL negative, SSA-B abs negative, SIFE/SPEP no M spike detected, A1c 5.7, TSH 1.09

## 2024-08-21 NOTE — REVIEW OF SYSTEMS
[Leg Weakness] : leg weakness [Poor Coordination] : poor coordination [Numbness] : numbness [Tingling] : tingling [Difficulty Walking] : difficulty walking [As Noted in HPI] : as noted in HPI [Negative] : Heme/Lymph [FreeTextEntry9] : LBP

## 2024-08-21 NOTE — HISTORY OF PRESENT ILLNESS
[FreeTextEntry1] : Patient is here for follow-up and B12 injection. Patient received last B12 injection on 8/16, feels better, he reports he had evaluation with physical therapy, feels slightly better is continuing to walk with a cane.  He has upcoming PT session tomorrow

## 2024-08-26 ENCOUNTER — APPOINTMENT (OUTPATIENT)
Dept: NEUROSURGERY | Facility: CLINIC | Age: 69
End: 2024-08-26

## 2024-08-26 ENCOUNTER — NON-APPOINTMENT (OUTPATIENT)
Age: 69
End: 2024-08-26

## 2024-08-29 ENCOUNTER — APPOINTMENT (OUTPATIENT)
Dept: NEUROLOGY | Facility: CLINIC | Age: 69
End: 2024-08-29
Payer: COMMERCIAL

## 2024-08-29 VITALS
BODY MASS INDEX: 24.25 KG/M2 | HEART RATE: 78 BPM | HEIGHT: 75 IN | WEIGHT: 195 LBS | SYSTOLIC BLOOD PRESSURE: 123 MMHG | DIASTOLIC BLOOD PRESSURE: 77 MMHG

## 2024-08-29 DIAGNOSIS — R26.81 UNSTEADINESS ON FEET: ICD-10-CM

## 2024-08-29 DIAGNOSIS — E53.8 DEFICIENCY OF OTHER SPECIFIED B GROUP VITAMINS: ICD-10-CM

## 2024-08-29 PROCEDURE — 99213 OFFICE O/P EST LOW 20 MIN: CPT

## 2024-08-29 NOTE — DISCUSSION/SUMMARY
[FreeTextEntry1] : 69-year-old M with PMHx of  HTN, HLD, scoliosis and colorectal CA 24 years ago, s/p RT followed by surgical resection+ chemo and iliostomy reversal; presents for evaluation of worsening balance and gait, difficulty ambulating unassisted and weakness of both lower extremities. Gait imbalance started 4-5 years ago, with sporadic low back pain with lumbar radicular symptoms, over the past 1 year, has worsening balance, difficulty ascending stairs, feels wobbly, has intermittent numbness/tingling sensation/cramping in the feet, he feels he needs to drag his feet, He started using cane 8 months ago, in house uses walker. He underwent right big toe/ partial second toe amputations due to nonhealing ulcers 2 years ago.  # B12  Deficiency  - Injection B12 1000 mcg IM x 2, 3rd dose administered today.   - Have recommended B12 q. weekly x 4 total then check level, and start 1000 mcg SL daily  # Given evolution over past 4-5 years, symptoms, nonhealing ulcers resulting in toe amputations as well as clinical exam; most likely suggestive of advanced sensorimotor neuropathy; exam/symptoms are not suggestive of myelopathy or thoracolumbar radiculopathy.  - NCV studies by Dr. Welch are conclusive for profound neuropathy, I reviewed the studies, most likely axonal etiology, however, the tables are manual - difficult to determine ?  mixed polyneuropathy, also NCV studies of upper extremities were not done. -I had a detailed discussion with the patient and his wife, as he has an important upcoming family event soon, I have recommended that he start physical therapy with special reference to strengthening of proximal lower extremity muscles and dorsiflexors. - I have also recommended obtaining a quad cane- will give him more stability instead of single prong cane - I have also recommended follow-up EMG/NCV studies of upper and lower extremities in 10-12 weeks to see evolution and rule out demyelinating neuropathy that may warrant specific treatment protocol

## 2024-08-29 NOTE — REASON FOR VISIT
[Procedure: _________] : a [unfilled] procedure visit [FreeTextEntry1] : For administration of B12 injection / gait instability

## 2024-08-29 NOTE — DATA REVIEWED
[de-identified] : 8/14/24. B12 level 221, Vitamin D 22.6, RF < 14, RPR NR, NIKHIL negative, SSA-B abs negative, SIFE/SPEP no M spike detected, A1c 5.7, TSH 1.09

## 2024-08-29 NOTE — PHYSICAL EXAM
[General Appearance - Alert] : alert [General Appearance - In No Acute Distress] : in no acute distress [General Appearance - Well-Appearing] : healthy appearing [Oriented To Time, Place, And Person] : oriented to person, place, and time [Impaired Insight] : insight and judgment were intact [Affect] : the affect was normal [Mood] : the mood was normal [Person] : oriented to person [Place] : oriented to place [Time] : oriented to time [Registration Intact] : recent registration memory intact [Concentration Intact] : normal concentrating ability [Naming Objects] : no difficulty naming common objects [Repeating Phrases] : no difficulty repeating a phrase [Fluency] : fluency intact [Comprehension] : comprehension intact [Past History] : adequate knowledge of personal past history [Cranial Nerves Optic (II)] : visual acuity intact bilaterally,  visual fields full to confrontation, pupils equal round and reactive to light [Cranial Nerves Oculomotor (III)] : extraocular motion intact [Cranial Nerves Trigeminal (V)] : facial sensation intact symmetrically [Cranial Nerves Facial (VII)] : face symmetrical [Cranial Nerves Vestibulocochlear (VIII)] : hearing was intact bilaterally [Cranial Nerves Glossopharyngeal (IX)] : tongue and palate midline [Cranial Nerves Accessory (XI - Cranial And Spinal)] : head turning and shoulder shrug symmetric [Cranial Nerves Hypoglossal (XII)] : there was no tongue deviation with protrusion [Motor Tone] : muscle tone was normal in all four extremities [Sensation Tactile Decrease] : light touch was intact [Vibration Decrease Leg / Foot Both Ankles] : decreased at both ankles [Vibration Decrease Leg / Foot Toes Both Feet] : decreased at the toes of both feet  [Position Sensation Decrease Leg/Foot At Level Of Toes] : impaired at the toes in both legs [Tremor] : no tremor present [Coordination - Dysmetria Impaired Finger-to-Nose Bilateral] : not present [2+] : Patella right 2+ [1+] : Patella left 1+ [0] : Ankle jerk left 0 [Plantar Reflex Right Only] : normal on the right [Plantar Reflex Left Only] : normal on the left [FreeTextEntry6] : No pronator drift, bilateral upper extremity strength 5/5, no tremors or fasciculation potentials.  Bilateral lower extremity right hip flexors 4+/5, left hip flexors 4+/5, right dorsiflexion 4+/5, left dorsiflexion 5/5.  Amputation of right big toe + second toe [FreeTextEntry7] : Diminished pinprick perception with distal to proximal gradient in lower extremities [FreeTextEntry8] : Gait/balance: Broad-based unsteady gait, unable to pull up without assist, unable to walk without assist

## 2024-08-29 NOTE — HISTORY OF PRESENT ILLNESS
[FreeTextEntry1] : Patient is here for follow-up and B12 injection, received last B12 injection on 8/21.  Pt reports he feels better, has started physical therapy, reports he has improved 25% from 2-4 in regard to his balance and walking, continues to use a cane has bought a new quad cane which I had suggested.  Patient is scheduled to go to [place] in a week for his daughter's wedding, he is scheduled to have 1 more injection prior to his departure

## 2024-08-30 LAB — CORE LAB BIOPSY: NORMAL

## 2024-08-30 NOTE — BRIEF OPERATIVE NOTE - CONDITION POST OP
Patient Name: Halie Guidry  : 1942    MRN: [id number]                              Today's Date: 2024       Admit Date: 2024    Visit Dx:     ICD-10-CM ICD-9-CM   1. Cellulitis of lower extremity, unspecified laterality  L03.119 682.6   2. Bilateral lower extremity edema  R60.0 782.3   3. Chronic kidney disease, unspecified CKD stage  N18.9 585.9   4. History of diabetes mellitus  Z86.39 V12.29     Patient Active Problem List   Diagnosis    Compression fracture    Lumbar degenerative disc disease    Type 2 diabetes mellitus with hyperglycemia, with long-term current use of insulin    Hyperlipidemia    Benign essential hypertension    Primary hypothyroidism    Neuropathy    Osteoporosis    Proteinuria    Tobacco abuse    Generalized weakness    Spinal stenosis    Scoliosis    Arthritis    VBI (vertebrobasilar insufficiency)    Orthostatic hypotension    Autonomic neuropathy due to secondary diabetes mellitus    Severe hypothyroidism    Noncompliance with medication regimen    Vitamin D deficiency disease    Tremor    Seizure    Thoracic degenerative disc disease    Hyperglycemia    Type II diabetes mellitus, uncontrolled    Lower abdominal pain    Transaminitis    Emphysematous cystitis    Choledocholithiasis    Hypokalemia    Hypoxia    Generalized abdominal pain    History of Clostridium difficile infection    History of ERCP    Hypomagnesemia    Anxiety disorder    Neuropathic pain    Intertrigo    Weakness of both lower extremities    Accelerated hypertension    Acute cystitis without hematuria    Left lower lobe pneumonia    Cellulitis and abscess of left lower extremity    Benign hypertension with CKD (chronic kidney disease) stage IV    Peripheral edema    Primary malignant neuroendocrine tumor of pancreas    Encounter for long-term (current) use of high-risk medication    Diabetic foot ulcer    Stage 3a chronic kidney disease    Pressure injury of buttock, stage 2    HTN  (hypertension)    Anemia due to chronic kidney disease    Bilateral lower extremity edema    Cellulitis of right lower extremity    CKD (chronic kidney disease) stage 4, GFR 15-29 ml/min    Acute CHF    Bilateral cellulitis of lower leg     Past Medical History:   Diagnosis Date    Acute metabolic encephalopathy 06/24/2022    Anxiety     Arthritis     Benign essential hypertension 08/20/2014    Bleeding disorder     Depression     Diabetes     Diabetes mellitus, type 2     Disc degeneration, lumbar     Headache, tension-type     Hyperlipidemia     Hypothyroidism     Neuropathy     Osteoporosis 09/09/2015    Pancreatic mass     Sept 2023, on Monthly Octreotide Injection    Peripheral neuropathy     Rotator cuff tear, left     Scoliosis     Shoulder pain     LEFT, TORN ROTATOR CUFF S/P FALL    Spinal stenosis      Past Surgical History:   Procedure Laterality Date    APPENDECTOMY      BILATERAL BREAST REDUCTION Bilateral 08/2015    CATARACT EXTRACTION  03/2015    CHOLECYSTECTOMY WITH INTRAOPERATIVE CHOLANGIOGRAM N/A 3/27/2022    Procedure: CHOLECYSTECTOMY LAPAROSCOPIC INTRAOPERATIVE CHOLANGIOGRAM;  Surgeon: Aiyana Hill MD;  Location: [facility] MAIN OR;  Service: General;  Laterality: N/A;    COLONOSCOPY  06/05/2015    WNL    ERCP N/A 2/25/2022    Procedure: ENDOSCOPIC RETROGRADE CHOLANGIOPANCREATOGRAPHY with sphincterotomy and balloon sweep;  Surgeon: Chilo Wilhelm MD;  Location: [facility] ENDOSCOPY;  Service: Gastroenterology;  Laterality: N/A;  PRE/POST - CBD stones    ERCP N/A 3/28/2022    Procedure: ENDOSCOPIC RETROGRADE CHOLANGIOPANCREATOGRAPHY WITH SPHINCTEROTOMY AND BALLOON SWEEP;  Surgeon: Chilo Wilhelm MD;  Location: [facility] ENDOSCOPY;  Service: Gastroenterology;  Laterality: N/A;  PRE: COMMON DUCT STONE  POST: COMMON DUCT STONE    KYPHOPLASTY      REDUCTION MAMMAPLASTY      TONSILLECTOMY        General Information       Row Name 08/30/24 1511          OT Time and Intention    Document Type  therapy note (daily note)  -RB     Mode of Treatment co-treatment;physical therapy;occupational therapy  low pt activity tolerance  -RB       Row Name 08/30/24 1511          General Information    Patient Profile Reviewed yes  -RB       Row Name 08/30/24 1511          Cognition    Orientation Status (Cognition) oriented x 4  -RB               User Key  (r) = Recorded By, (t) = Taken By, (c) = Cosigned By      Initials Name Provider Type    RB Dian Matta OT Occupational Therapist                     Mobility/ADL's       Row Name 08/30/24 1508          Bed Mobility    Bed Mobility supine-sit  -RB     Supine-Sit [place] (Bed Mobility) standby assist;verbal cues  -RB     Assistive Device (Bed Mobility) bed rails  -RB       Row Name 08/30/24 1508          Transfers    Transfers sit-stand transfer;stand-sit transfer  -RB     Comment, (Transfers) several stands today ranging in assist levels from CGA-Mod A x2.  -RB       Row Name 08/30/24 1508          Bed-Chair Transfer    Bed-Chair [place] (Transfers) moderate assist (50% patient effort);2 person assist;verbal cues  -RB     Assistive Device (Bed-Chair Transfers) walker, front-wheeled  -RB       Row Name 08/30/24 1508          Sit-Stand Transfer    Sit-Stand [place] (Transfers) moderate assist (50% patient effort);2 person assist;verbal cues  -RB     Assistive Device (Sit-Stand Transfers) walker, front-wheeled  -RB       Row Name 08/30/24 1508          Stand-Sit Transfer    Stand-Sit [place] (Transfers) moderate assist (50% patient effort);2 person assist;verbal cues  -RB     Assistive Device (Stand-Sit Transfers) walker, front-wheeled  -RB       Row Name 08/30/24 1508          Functional Mobility    Functional Mobility- Ind. Level not tested;unable to perform  -RB       Row Name 08/30/24 1508          Activities of Daily Living    BADL Assessment/Intervention grooming  -RB       Row Name 08/30/24 1508          Grooming Assessment/Training     [place] Level (Grooming) grooming skills;verbal cues;contact guard assist  -RB     Position (Grooming) sink side;supported standing  -RB               User Key  (r) = Recorded By, (t) = Taken By, (c) = Cosigned By      Initials Name Provider Type    Dian Manuel OT Occupational Therapist                   Obj/Interventions       Row Name 08/30/24 150          Balance    Comment, Balance Forward flexed posture with standing.  -RB               User Key  (r) = Recorded By, (t) = Taken By, (c) = Cosigned By      Initials Name Provider Type    Dian Manuel OT Occupational Therapist                   Goals/Plan    No documentation.                  Clinical Impression       Row Name 08/30/24 1508          Pain Assessment    Pretreatment Pain Rating 0/10 - no pain  -RB     Posttreatment Pain Rating 0/10 - no pain  -RB       Row Name 08/30/24 150          Plan of Care Review    Plan of Care Reviewed With patient  -RB     Progress improving  -RB     Outcome Evaluation The pt participated in OT/PT this afternoon. She completed bed mobility with SBA. Mod A x2 to stand several times with the use of a walker. Chair brought close to her and she pivoted with the same level of assistance. Her chair was pushed to sinkside and she participated in several stands (CGA/Min A x2) while performing oral grooming. SNF recommended.  -RB       Row Name 08/30/24 1501          Therapy Assessment/Plan (OT)    Rehab Potential (OT) good, to achieve stated therapy goals  -RB     Criteria for Skilled Therapeutic Interventions Met (OT) yes;skilled treatment is necessary  -RB     Therapy Frequency (OT) 5 times/wk  -RB       Row Name 08/30/24 6055          Therapy Plan Review/Discharge Plan (OT)    Anticipated Discharge Disposition (OT) skilled nursing facility  -RB               User Key  (r) = Recorded By, (t) = Taken By, (c) = Cosigned By      Initials Name Provider Type    Dian Manuel OT Occupational Therapist                    Outcome Measures       Row Name 08/30/24 0844          How much help from another person do you currently need...    Turning from your back to your side while in flat bed without using bedrails? 3  -MA     Moving from lying on back to sitting on the side of a flat bed without bedrails? 2  -MA     Moving to and from a bed to a chair (including a wheelchair)? 2  -MA     Standing up from a chair using your arms (e.g., wheelchair, bedside chair)? 2  -MA     Climbing 3-5 steps with a railing? 1  -MA     To walk in hospital room? 2  -MA     AM-PAC 6 Clicks Score (PT) 12  -MA     Highest Level of Mobility Goal 4 --> Transfer to chair/commode  -MA               User Key  (r) = Recorded By, (t) = Taken By, (c) = Cosigned By      Initials Name Provider Type    Mame Rodriguez RN Registered Nurse                    Occupational Therapy Education       Title: PT OT SLP Therapies (Done)       Topic: Occupational Therapy (Done)       Point: ADL training (Done)       Description:   Instruct learner(s) on proper safety adaptation and remediation techniques during self care or transfers.   Instruct in proper use of assistive devices.                  Learning Progress Summary             Patient Acceptance, E, VU,NR by RC at 8/29/2024 0428    Acceptance, E,TB, DU,VU,NR by RB at 8/28/2024 1618    Comment: Education provided on her transfer status, technique for ADL's, her D/C planning and awareness for assistance/various options in her [facility].    Acceptance, E, VU by ESPINOZA at 8/25/2024 1435    Acceptance, E, VU by MM at 8/25/2024 1152    Comment: role of OT, d/c rec                         Point: Home exercise program (Done)       Description:   Instruct learner(s) on appropriate technique for monitoring, assisting and/or progressing therapeutic exercises/activities.                  Learning Progress Summary             Patient Acceptance, E, VU,NR by RC at 8/29/2024 0428    Acceptance, E,TB, DU,VU,NR by RB at  8/28/2024 1618    Comment: Education provided on her transfer status, technique for ADL's, her D/C planning and awareness for assistance/various options in her group home.                         Point: Precautions (Done)       Description:   Instruct learner(s) on prescribed precautions during self-care and functional transfers.                  Learning Progress Summary             Patient Acceptance, E, VU,NR by RC at 8/29/2024 0428    Acceptance, E,TB, DU,VU,NR by RB at 8/28/2024 1618    Comment: Education provided on her transfer status, technique for ADL's, her D/C planning and awareness for assistance/various options in her [facility].    Acceptance, E, VU by ESPINOZA at 8/25/2024 1435    Acceptance, E, VU by MM at 8/25/2024 1152    Comment: role of OT, d/c rec                         Point: Body mechanics (Done)       Description:   Instruct learner(s) on proper positioning and spine alignment during self-care, functional mobility activities and/or exercises.                  Learning Progress Summary             Patient Acceptance, E, VU,NR by RC at 8/29/2024 0428    Acceptance, E,TB, DU,VU,NR by RB at 8/28/2024 1618    Comment: Education provided on her transfer status, technique for ADL's, her D/C planning and awareness for assistance/various options in her group home.    Acceptance, E, VU by ESPINOZA at 8/25/2024 1435    Acceptance, E, VU by MM at 8/25/2024 1152    Comment: role of OT, d/c rec                                         User Key       Initials Effective Dates Name Provider Type Discipline     06/16/21 -  Dian Matta OT Occupational Therapist OT    MM 05/31/24 -  Arcelia Hoyos OT Occupational Therapist OT    JL 01/16/24 -  Kenya Ruiz, PT Physical Therapist PT     06/25/24 -  Josephine Puckett, RN Registered Nurse Nurse                  OT Recommendation and Plan  Therapy Frequency (OT): 5 times/wk  Plan of Care Review  Plan of Care Reviewed With: patient  Progress: improving  Outcome Evaluation: The pt  participated in OT/PT this afternoon. She completed bed mobility with SBA. Mod A x2 to stand several times with the use of a walker. Chair brought close to her and she pivoted with the same level of assistance. Her chair was pushed to sinkside and she participated in several stands (CGA/Min A x2) while performing oral grooming. SNF recommended.     Time Calculation:         Time Calculation- OT       Row Name 08/30/24 1507             Time Calculation- OT    OT Start Time 1434  -RB      OT Stop Time 1452  -RB      OT Time Calculation (min) 18 min  -RB      Total Timed Code Minutes- OT 18 minute(s)  -RB      OT Received On 08/30/24  -RB      OT - Next Appointment 09/03/24  -RB         Timed Charges    27662 - OT Self Care/Mgmt Minutes 10  -RB         Total Minutes    Timed Charges Total Minutes 10  -RB       Total Minutes 10  -RB                User Key  (r) = Recorded By, (t) = Taken By, (c) = Cosigned By      Initials Name Provider Type    RB Dian Matta OT Occupational Therapist                  Therapy Charges for Today       Code Description Service Date Service Provider Modifiers Qty    [id number] HC OT SELF CARE/MGMT/TRAIN EA 15 MIN 8/30/2024 Dian Matta OT GO 1                 Dian Matta OT  8/30/2024   stable

## 2024-09-05 ENCOUNTER — APPOINTMENT (OUTPATIENT)
Dept: NEUROLOGY | Facility: CLINIC | Age: 69
End: 2024-09-05
Payer: COMMERCIAL

## 2024-09-05 VITALS
HEIGHT: 75 IN | SYSTOLIC BLOOD PRESSURE: 104 MMHG | BODY MASS INDEX: 24.25 KG/M2 | WEIGHT: 195 LBS | DIASTOLIC BLOOD PRESSURE: 64 MMHG | HEART RATE: 89 BPM

## 2024-09-05 PROCEDURE — 99203 OFFICE O/P NEW LOW 30 MIN: CPT

## 2024-09-05 PROCEDURE — 99213 OFFICE O/P EST LOW 20 MIN: CPT

## 2024-09-05 NOTE — DISCUSSION/SUMMARY
[FreeTextEntry1] : 69-year-old with peripheral neuropathy, B12 deficiency, today his fourth weekly 1000 mcg B12 shot.  Tolerated well. Orders for repeat serum B12 levels in place advised to obtain He comes to the office. Patient will follow-up with Dr. Carter.

## 2024-09-05 NOTE — DATA REVIEWED
[de-identified] :   MR Lumbar Spine No Cont             Final  No Documents Attached    	 EXAM: [id number] - MR SPINE LUMBAR  - ORDERED BY: JESSICA MARTEL   PROCEDURE DATE:  07/03/2024    INTERPRETATION:  LUMBAR SPINE MRI  CLINICAL INFORMATION: Pain following strain injury. No prior surgery. Scoliosis.  TECHNIQUE: Multiplanar, multisequence MRI was obtained of the lumbar spine.  COMPARISON: Radiographs dated 6/20/2024 and MRI dated 6/26/2020  FINDINGS:  OSSEOUS: Moderate to severe levoscoliosis with apex at L2. Mixed Modic degenerative endplate changes most prominent along the right lateral margin at L1-L2 and L2-L3 as well as on the left lateral margin at L5-S1. No acute fracture, spondylolisthesis, or aggressive osseous neoplasm.  LEVEL BY LEVEL ANALYSIS:  T12-L1: Right worse than left facet arthrosis. Otherwise no significant abnormality.  L1-L2: No significant abnormality.  L2-L3: No significant abnormality.  L3-L4: No significant abnormality.  L4-L5: Shallow broad-based posterior disc osteophyte complex and mild to moderate facet arthrosis contribute to mild left-sided neural canal stenosis. Spinal canal and right neural canal are adequate.  L5-S1: Left foraminal predominant disc osteophyte complex and moderate left worse than right facet arthrosis contribute to moderately severe left-sided neural canal stenosis. Spinal canal and right neural canal are adequate.  GENERAL: Conus medullaris tip is anatomic in positioning. No intradural or extradural lesion.  IMPRESSION: 1.  At L5-S1 unchanged left foraminal predominant disc osteophyte complex contributes to moderately severe ipsilateral neural canal stenosis. Correlate for possible left-sided L5 radiculopathy. 2.  Background mild lumbar levoscoliosis and advanced discogenic lumbar spondylosis. No high-grade lumbar spinal canal stenosis.  ADDITIONAL CLINICAL INFORMATION: Scoliosis M41.9  --- End of Report ---

## 2024-09-05 NOTE — HISTORY OF PRESENT ILLNESS
[FreeTextEntry1] : 69-year-old right-handed history of hypertension hyperlipidemia past history of cancer with complaints of difficulty walking, weakness, difficulty with balance.  Complaining of numbness distally in the feet.  In the past evaluated for chronic back pain, MRI of the lumbar spine multilevel degenerative disease, electrodiagnostic study July 2022 read as axonal sensorimotor neuropathy.  History of neuropathy low B12.  On supplementation 1000 mcg weekly.

## 2024-09-24 ENCOUNTER — NON-APPOINTMENT (OUTPATIENT)
Age: 69
End: 2024-09-24

## 2024-09-25 ENCOUNTER — APPOINTMENT (OUTPATIENT)
Dept: NEUROLOGY | Facility: CLINIC | Age: 69
End: 2024-09-25
Payer: COMMERCIAL

## 2024-09-25 VITALS
HEART RATE: 82 BPM | SYSTOLIC BLOOD PRESSURE: 108 MMHG | DIASTOLIC BLOOD PRESSURE: 73 MMHG | HEIGHT: 75 IN | WEIGHT: 195 LBS | TEMPERATURE: 98.2 F | BODY MASS INDEX: 24.25 KG/M2

## 2024-09-25 DIAGNOSIS — G62.9 POLYNEUROPATHY, UNSPECIFIED: ICD-10-CM

## 2024-09-25 DIAGNOSIS — E53.8 DEFICIENCY OF OTHER SPECIFIED B GROUP VITAMINS: ICD-10-CM

## 2024-09-25 PROCEDURE — 99214 OFFICE O/P EST MOD 30 MIN: CPT

## 2024-09-25 RX ORDER — MAGNESIUM 200 MG
1000 TABLET ORAL
Qty: 90 | Refills: 0 | Status: ACTIVE | COMMUNITY
Start: 2024-09-25

## 2024-09-25 NOTE — HISTORY OF PRESENT ILLNESS
[FreeTextEntry1] : Patient is here for follow-up, last seen for B12 injection on 9/5/24. Pt reports he feels better, he has improved > 50 % in regard to his balance and walking, He completed physical therapy, has joined a gym, he is working every day, is also using weights, he is able to walk unassisted but uses a cane as needed.  Patient has just returned from [place] after walking his daughter for her wedding.  Patient is taking B12 100 0 mcg SL daily, last B12 level checked was 711, folate 8.7

## 2024-09-25 NOTE — DATA REVIEWED
[de-identified] : 9/20/24: B12 711 8/14/24. B12 level 221, Vitamin D 22.6, RF < 14, RPR NR, NIKHIL negative, SSA-B abs negative, SIFE/SPEP no M spike detected, A1c 5.7, TSH 1.09

## 2024-09-25 NOTE — REASON FOR VISIT
[Follow-Up: _____] : a [unfilled] follow-up visit [FreeTextEntry1] : For B12 def/ gait instability/ f-u labs

## 2024-09-25 NOTE — DISCUSSION/SUMMARY
[FreeTextEntry1] : 69-year-old M with PMHx of  HTN, HLD, scoliosis and colorectal CA 24 years ago, s/p RT followed by surgical resection+ chemo and iliostomy reversal; presents for evaluation of worsening balance and gait, difficulty ambulating unassisted and weakness of both lower extremities. Gait imbalance started 4-5 years ago, with sporadic low back pain with lumbar radicular symptoms, over the past 1 year, has worsening balance, difficulty ascending stairs, feels wobbly, has intermittent numbness/tingling sensation/cramping in the feet, he feels he needs to drag his feet, He started using cane 8 months ago, in house uses walker. He underwent right big toe/ partial second toe amputations due to nonhealing ulcers 2 years ago.  # B12  Deficiency, Remarkably improved, status post B12 injections X4, last B12 level 711    - Have recommended B12 1000 mcg SL daily  # Given evolution over past 4-5 years, symptoms, nonhealing ulcers resulting in toe amputations as well as clinical exam; most likely suggestive of advanced sensorimotor neuropathy; exam/symptoms are not suggestive of myelopathy or thoracolumbar radiculopathy.  - NCV studies by Dr. Welch are conclusive for profound neuropathy, I reviewed the studies, most likely axonal etiology, however, the tables are manual - difficult to determine ?  mixed polyneuropathy, also NCV studies of upper extremities were not done. -I have recommended follow-up EMG/NCV studies of upper and lower extremities in 10-12 weeks to see evolution and rule out demyelinating neuropathy -Continue regular lower extremity strengthening exercises

## 2024-09-25 NOTE — DATA REVIEWED
[de-identified] : 9/20/24: B12 711 8/14/24. B12 level 221, Vitamin D 22.6, RF < 14, RPR NR, NIKHIL negative, SSA-B abs negative, SIFE/SPEP no M spike detected, A1c 5.7, TSH 1.09

## 2024-09-25 NOTE — PHYSICAL EXAM
[General Appearance - Alert] : alert [General Appearance - In No Acute Distress] : in no acute distress [General Appearance - Well-Appearing] : healthy appearing [Oriented To Time, Place, And Person] : oriented to person, place, and time [Impaired Insight] : insight and judgment were intact [Affect] : the affect was normal [Mood] : the mood was normal [Person] : oriented to person [Place] : oriented to place [Time] : oriented to time [Registration Intact] : recent registration memory intact [Concentration Intact] : normal concentrating ability [Naming Objects] : no difficulty naming common objects [Repeating Phrases] : no difficulty repeating a phrase [Fluency] : fluency intact [Comprehension] : comprehension intact [Past History] : adequate knowledge of personal past history [Cranial Nerves Optic (II)] : visual acuity intact bilaterally,  visual fields full to confrontation, pupils equal round and reactive to light [Cranial Nerves Oculomotor (III)] : extraocular motion intact [Cranial Nerves Trigeminal (V)] : facial sensation intact symmetrically [Cranial Nerves Facial (VII)] : face symmetrical [Cranial Nerves Vestibulocochlear (VIII)] : hearing was intact bilaterally [Cranial Nerves Glossopharyngeal (IX)] : tongue and palate midline [Cranial Nerves Accessory (XI - Cranial And Spinal)] : head turning and shoulder shrug symmetric [Cranial Nerves Hypoglossal (XII)] : there was no tongue deviation with protrusion [Motor Tone] : muscle tone was normal in all four extremities [Sensation Tactile Decrease] : light touch was intact [Vibration Decrease Leg / Foot Both Ankles] : decreased at both ankles [Vibration Decrease Leg / Foot Toes Both Feet] : decreased at the toes of both feet  [Position Sensation Decrease Leg/Foot At Level Of Toes] : impaired at the toes in both legs [2+] : Patella right 2+ [1+] : Patella left 1+ [0] : Ankle jerk left 0 [Tremor] : no tremor present [Coordination - Dysmetria Impaired Finger-to-Nose Bilateral] : not present [Plantar Reflex Right Only] : normal on the right [Plantar Reflex Left Only] : normal on the left [FreeTextEntry6] : No pronator drift, bilateral upper extremity strength 5/5, no tremors or fasciculation potentials.  Bilateral lower extremity right hip flexors 4+/5, left hip flexors 4+/5, right dorsiflexion 4+/5, left dorsiflexion 5/5.  Amputation of right big toe + second toe [FreeTextEntry7] : Diminished pinprick perception with distal to proximal gradient in lower extremities [FreeTextEntry8] : Gait/balance: Broad-based unsteady gait, unable to pull up without assist, unable to walk without assist [] : the neck was supple [Neck Cervical Mass (___cm)] : no neck mass was observed [Arterial Pulses Carotid] : carotid pulses were normal with no bruits [Edema] : there was no peripheral edema [Involuntary Movements] : no involuntary movements were seen

## 2024-10-01 ENCOUNTER — APPOINTMENT (OUTPATIENT)
Dept: DERMATOLOGY | Facility: CLINIC | Age: 69
End: 2024-10-01
Payer: COMMERCIAL

## 2024-10-01 DIAGNOSIS — Z85.89 PERSONAL HISTORY OF MALIGNANT NEOPLASM OF OTHER ORGANS AND SYSTEMS: ICD-10-CM

## 2024-10-01 DIAGNOSIS — Z85.828 PERSONAL HISTORY OF OTHER MALIGNANT NEOPLASM OF SKIN: ICD-10-CM

## 2024-10-01 PROBLEM — C44.619 BASAL CELL CARCINOMA (BCC) OF LEFT UPPER EXTREMITY: Status: ACTIVE | Noted: 2024-10-01

## 2024-10-01 PROCEDURE — 13121 CMPLX RPR S/A/L 2.6-7.5 CM: CPT

## 2024-10-01 PROCEDURE — 11603 EXC TR-EXT MAL+MARG 2.1-3 CM: CPT

## 2024-10-01 NOTE — PROCEDURE
[___ cm] : [unfilled] cm [___ mm] : [unfilled] mm [___ ml of 1% lidocaine w/ 1:100,000 epinephrine] : [unfilled] ml of 1% lidocaine with 1:100,000 epinephrine [Electrodessication] : electrodessication [____ cm] : [unfilled] cm [____ days] : [unfilled] days [FreeTextEntry7] : Right upper arm ("A") [FreeTextEntry2] : 4-0 vicryl x 4 SQ. [FreeTextEntry3] : 4-0 ethilon x 11 interrupted. [TWNoteComboBox3] : Subcutaneous fat [TWNoteComboBox5] : Subcutaneous fat

## 2024-10-10 ENCOUNTER — APPOINTMENT (OUTPATIENT)
Dept: DERMATOLOGY | Facility: CLINIC | Age: 69
End: 2024-10-10

## 2024-10-10 DIAGNOSIS — C44.519 BASAL CELL CARCINOMA OF SKIN OF OTHER PART OF TRUNK: ICD-10-CM

## 2024-10-10 DIAGNOSIS — D48.5 NEOPLASM OF UNCERTAIN BEHAVIOR OF SKIN: ICD-10-CM

## 2024-10-10 DIAGNOSIS — C44.619 BASAL CELL CARCINOMA OF SKIN OF LEFT UPPER LIMB, INCLUDING SHOULDER: ICD-10-CM

## 2024-10-10 PROCEDURE — 17262 DSTRJ MAL LES T/A/L 1.1-2.0: CPT | Mod: 59,79

## 2024-10-10 PROCEDURE — 11103 TANGNTL BX SKIN EA SEP/ADDL: CPT

## 2024-10-10 PROCEDURE — 17263 DSTRJ MAL LES T/A/L 2.1-3.0: CPT | Mod: 79

## 2024-10-10 PROCEDURE — 11102 TANGNTL BX SKIN SINGLE LES: CPT | Mod: 79,59

## 2024-10-11 LAB — CORE LAB BIOPSY: NORMAL

## 2024-10-14 ENCOUNTER — APPOINTMENT (OUTPATIENT)
Dept: DERMATOLOGY | Facility: CLINIC | Age: 69
End: 2024-10-14
Payer: COMMERCIAL

## 2024-10-14 PROCEDURE — 99024 POSTOP FOLLOW-UP VISIT: CPT

## 2024-10-15 LAB — CORE LAB BIOPSY: NORMAL

## 2024-10-28 ENCOUNTER — APPOINTMENT (OUTPATIENT)
Dept: DERMATOLOGY | Facility: CLINIC | Age: 69
End: 2024-10-28
Payer: COMMERCIAL

## 2024-10-28 VITALS — HEIGHT: 75 IN | BODY MASS INDEX: 24.25 KG/M2 | WEIGHT: 195 LBS

## 2024-10-28 DIAGNOSIS — C44.629 SQUAMOUS CELL CARCINOMA OF SKIN OF LEFT UPPER LIMB, INCLUDING SHOULDER: ICD-10-CM

## 2024-10-28 PROCEDURE — 11602 EXC TR-EXT MAL+MARG 1.1-2 CM: CPT

## 2024-10-28 PROCEDURE — 13121 CMPLX RPR S/A/L 2.6-7.5 CM: CPT

## 2024-11-12 ENCOUNTER — APPOINTMENT (OUTPATIENT)
Dept: DERMATOLOGY | Facility: CLINIC | Age: 69
End: 2024-11-12
Payer: COMMERCIAL

## 2024-11-12 DIAGNOSIS — C44.619 BASAL CELL CARCINOMA OF SKIN OF LEFT UPPER LIMB, INCLUDING SHOULDER: ICD-10-CM

## 2024-11-12 PROCEDURE — 13121 CMPLX RPR S/A/L 2.6-7.5 CM: CPT

## 2024-11-12 PROCEDURE — 11604 EXC TR-EXT MAL+MARG 3.1-4 CM: CPT

## 2024-11-26 ENCOUNTER — APPOINTMENT (OUTPATIENT)
Dept: DERMATOLOGY | Facility: CLINIC | Age: 69
End: 2024-11-26
Payer: COMMERCIAL

## 2024-11-26 DIAGNOSIS — L57.0 ACTINIC KERATOSIS: ICD-10-CM

## 2024-11-26 PROCEDURE — 99213 OFFICE O/P EST LOW 20 MIN: CPT

## 2024-11-26 RX ORDER — FLUOROURACIL 50 MG/G
5 CREAM TOPICAL TWICE DAILY
Qty: 40 | Refills: 1 | Status: ACTIVE | COMMUNITY
Start: 2024-11-26 | End: 1900-01-01

## 2024-12-12 ENCOUNTER — APPOINTMENT (OUTPATIENT)
Dept: NEUROLOGY | Facility: CLINIC | Age: 69
End: 2024-12-12

## 2024-12-25 PROBLEM — F10.90 ALCOHOL USE: Status: ACTIVE | Noted: 2017-03-23

## 2025-01-02 ENCOUNTER — APPOINTMENT (OUTPATIENT)
Dept: NEUROLOGY | Facility: CLINIC | Age: 70
End: 2025-01-02
Payer: COMMERCIAL

## 2025-01-02 VITALS
HEIGHT: 75 IN | WEIGHT: 195 LBS | SYSTOLIC BLOOD PRESSURE: 135 MMHG | HEART RATE: 67 BPM | BODY MASS INDEX: 24.25 KG/M2 | DIASTOLIC BLOOD PRESSURE: 74 MMHG

## 2025-01-02 DIAGNOSIS — G60.8 OTHER HEREDITARY AND IDIOPATHIC NEUROPATHIES: ICD-10-CM

## 2025-01-02 DIAGNOSIS — G56.03 CARPAL TUNNEL SYNDROM,BILATERAL UPPER LIMBS: ICD-10-CM

## 2025-01-02 DIAGNOSIS — E53.8 DEFICIENCY OF OTHER SPECIFIED B GROUP VITAMINS: ICD-10-CM

## 2025-01-02 PROCEDURE — 95911 NRV CNDJ TEST 9-10 STUDIES: CPT | Mod: 59

## 2025-01-02 PROCEDURE — 99213 OFFICE O/P EST LOW 20 MIN: CPT | Mod: 25

## 2025-02-18 ENCOUNTER — APPOINTMENT (OUTPATIENT)
Dept: DERMATOLOGY | Facility: CLINIC | Age: 70
End: 2025-02-18

## 2025-02-18 DIAGNOSIS — L57.0 ACTINIC KERATOSIS: ICD-10-CM

## 2025-02-18 DIAGNOSIS — D22.9 MELANOCYTIC NEVI, UNSPECIFIED: ICD-10-CM

## 2025-02-18 DIAGNOSIS — L81.4 OTHER MELANIN HYPERPIGMENTATION: ICD-10-CM

## 2025-02-18 DIAGNOSIS — L82.1 OTHER SEBORRHEIC KERATOSIS: ICD-10-CM

## 2025-02-18 PROCEDURE — 17004 DESTROY PREMAL LESIONS 15/>: CPT

## 2025-02-18 PROCEDURE — 99213 OFFICE O/P EST LOW 20 MIN: CPT | Mod: 25

## 2025-02-18 RX ORDER — SODIUM SULFATE, POTASSIUM SULFATE AND MAGNESIUM SULFATE 1.6; 3.13; 17.5 G/177ML; G/177ML; G/177ML
17.5-3.13-1.6 SOLUTION ORAL
Qty: 354 | Refills: 0 | Status: ACTIVE | COMMUNITY
Start: 2024-11-20

## 2025-02-18 RX ORDER — CHOLESTYRAMINE 4 G/9G
4 POWDER, FOR SUSPENSION ORAL
Qty: 30 | Refills: 0 | Status: ACTIVE | COMMUNITY
Start: 2024-11-20

## 2025-02-18 RX ORDER — SYRINGE WITH NEEDLE, 1 ML 25GX5/8"
23G X 1" SYRINGE, EMPTY DISPOSABLE MISCELLANEOUS
Qty: 4 | Refills: 0 | Status: ACTIVE | COMMUNITY
Start: 2024-08-19

## 2025-07-22 ENCOUNTER — APPOINTMENT (OUTPATIENT)
Dept: NEUROLOGY | Facility: CLINIC | Age: 70
End: 2025-07-22

## 2025-07-28 ENCOUNTER — APPOINTMENT (OUTPATIENT)
Dept: DERMATOLOGY | Facility: CLINIC | Age: 70
End: 2025-07-28
Payer: COMMERCIAL

## 2025-07-28 DIAGNOSIS — L57.0 ACTINIC KERATOSIS: ICD-10-CM

## 2025-07-28 DIAGNOSIS — D48.5 NEOPLASM OF UNCERTAIN BEHAVIOR OF SKIN: ICD-10-CM

## 2025-07-28 DIAGNOSIS — D22.9 MELANOCYTIC NEVI, UNSPECIFIED: ICD-10-CM

## 2025-07-28 DIAGNOSIS — L82.1 OTHER SEBORRHEIC KERATOSIS: ICD-10-CM

## 2025-07-28 DIAGNOSIS — L81.4 OTHER MELANIN HYPERPIGMENTATION: ICD-10-CM

## 2025-07-28 PROCEDURE — 11102 TANGNTL BX SKIN SINGLE LES: CPT

## 2025-07-28 PROCEDURE — 99213 OFFICE O/P EST LOW 20 MIN: CPT | Mod: 25

## 2025-07-28 PROCEDURE — 17003 DESTRUCT PREMALG LES 2-14: CPT

## 2025-07-28 PROCEDURE — 11103 TANGNTL BX SKIN EA SEP/ADDL: CPT

## 2025-07-28 PROCEDURE — 17000 DESTRUCT PREMALG LESION: CPT | Mod: 59

## 2025-08-04 LAB — CORE LAB BIOPSY: NORMAL

## 2025-08-21 ENCOUNTER — APPOINTMENT (OUTPATIENT)
Dept: DERMATOLOGY | Facility: CLINIC | Age: 70
End: 2025-08-21
Payer: COMMERCIAL

## 2025-08-21 ENCOUNTER — NON-APPOINTMENT (OUTPATIENT)
Age: 70
End: 2025-08-21

## 2025-08-21 DIAGNOSIS — C44.629 SQUAMOUS CELL CARCINOMA OF SKIN OF LEFT UPPER LIMB, INCLUDING SHOULDER: ICD-10-CM

## 2025-08-21 PROCEDURE — 11622 EXC S/N/H/F/G MAL+MRG 1.1-2: CPT

## 2025-08-21 PROCEDURE — 13132 CMPLX RPR F/C/C/M/N/AX/G/H/F: CPT

## 2025-08-28 ENCOUNTER — APPOINTMENT (OUTPATIENT)
Dept: DERMATOLOGY | Facility: CLINIC | Age: 70
End: 2025-08-28
Payer: COMMERCIAL

## 2025-08-28 PROCEDURE — 99024 POSTOP FOLLOW-UP VISIT: CPT

## 2025-09-04 ENCOUNTER — APPOINTMENT (OUTPATIENT)
Dept: DERMATOLOGY | Facility: CLINIC | Age: 70
End: 2025-09-04
Payer: COMMERCIAL

## 2025-09-04 DIAGNOSIS — C44.619 BASAL CELL CARCINOMA OF SKIN OF LEFT UPPER LIMB, INCLUDING SHOULDER: ICD-10-CM

## 2025-09-04 PROCEDURE — 17263 DSTRJ MAL LES T/A/L 2.1-3.0: CPT
